# Patient Record
Sex: MALE | Race: WHITE | Employment: OTHER | URBAN - METROPOLITAN AREA
[De-identification: names, ages, dates, MRNs, and addresses within clinical notes are randomized per-mention and may not be internally consistent; named-entity substitution may affect disease eponyms.]

---

## 2017-01-13 RX ORDER — ATORVASTATIN CALCIUM 10 MG/1
10 TABLET, FILM COATED ORAL DAILY
Qty: 90 TABLET | Refills: 1 | Status: SHIPPED | OUTPATIENT
Start: 2017-01-13 | End: 2017-07-12 | Stop reason: SDUPTHER

## 2017-04-20 ENCOUNTER — TELEPHONE (OUTPATIENT)
Dept: FAMILY MEDICINE CLINIC | Age: 67
End: 2017-04-20

## 2017-04-20 DIAGNOSIS — E78.2 MIXED HYPERLIPIDEMIA: Primary | Chronic | ICD-10-CM

## 2017-04-20 DIAGNOSIS — Z12.5 SCREENING PSA (PROSTATE SPECIFIC ANTIGEN): ICD-10-CM

## 2017-04-26 ENCOUNTER — HOSPITAL ENCOUNTER (OUTPATIENT)
Age: 67
Setting detail: SPECIMEN
Discharge: HOME | End: 2017-04-26
Payer: COMMERCIAL

## 2017-04-26 ENCOUNTER — TELEPHONE (OUTPATIENT)
Dept: FAMILY MEDICINE CLINIC | Age: 67
End: 2017-04-26

## 2017-04-26 DIAGNOSIS — E78.2 MIXED HYPERLIPIDEMIA: Chronic | ICD-10-CM

## 2017-04-26 DIAGNOSIS — Z12.5 SCREENING PSA (PROSTATE SPECIFIC ANTIGEN): ICD-10-CM

## 2017-04-26 DIAGNOSIS — Z11.59 NEED FOR HEPATITIS C SCREENING TEST: Primary | ICD-10-CM

## 2017-04-26 LAB
ALBUMIN SERPL-MCNC: 4.3 G/DL (ref 3.5–5.2)
ALBUMIN/GLOBULIN RATIO: 2.2 (ref 1–2.5)
ALP BLD-CCNC: 76 U/L (ref 40–129)
ALT SERPL-CCNC: 25 U/L (ref 5–41)
ANION GAP SERPL CALCULATED.3IONS-SCNC: 16 MMOL/L (ref 9–17)
AST SERPL-CCNC: 26 U/L
BILIRUB SERPL-MCNC: 0.74 MG/DL (ref 0.3–1.2)
BUN BLDV-MCNC: 18 MG/DL (ref 8–23)
BUN/CREAT BLD: ABNORMAL (ref 9–20)
CALCIUM SERPL-MCNC: 8.6 MG/DL (ref 8.6–10.4)
CHLORIDE BLD-SCNC: 101 MMOL/L (ref 98–107)
CHOLESTEROL, FASTING: 188 MG/DL
CHOLESTEROL/HDL RATIO: 3.2
CO2: 24 MMOL/L (ref 20–31)
CREAT SERPL-MCNC: 0.63 MG/DL (ref 0.7–1.2)
GFR AFRICAN AMERICAN: >60 ML/MIN
GFR NON-AFRICAN AMERICAN: >60 ML/MIN
GFR SERPL CREATININE-BSD FRML MDRD: ABNORMAL ML/MIN/{1.73_M2}
GFR SERPL CREATININE-BSD FRML MDRD: ABNORMAL ML/MIN/{1.73_M2}
GLUCOSE FASTING: 94 MG/DL (ref 70–99)
HDLC SERPL-MCNC: 58 MG/DL
HEPATITIS C ANTIBODY: NONREACTIVE
LDL CHOLESTEROL: 113 MG/DL (ref 0–130)
POTASSIUM SERPL-SCNC: 4.7 MMOL/L (ref 3.7–5.3)
PROSTATE SPECIFIC ANTIGEN: 0.84 UG/L
SODIUM BLD-SCNC: 141 MMOL/L (ref 135–144)
TOTAL PROTEIN: 6.3 G/DL (ref 6.4–8.3)
TRIGLYCERIDE, FASTING: 83 MG/DL
VLDLC SERPL CALC-MCNC: NORMAL MG/DL (ref 1–30)

## 2017-05-15 ENCOUNTER — OFFICE VISIT (OUTPATIENT)
Dept: FAMILY MEDICINE CLINIC | Age: 67
End: 2017-05-15
Payer: COMMERCIAL

## 2017-05-15 VITALS
TEMPERATURE: 98.1 F | WEIGHT: 215.3 LBS | DIASTOLIC BLOOD PRESSURE: 82 MMHG | BODY MASS INDEX: 28.41 KG/M2 | SYSTOLIC BLOOD PRESSURE: 144 MMHG | HEART RATE: 84 BPM | RESPIRATION RATE: 16 BRPM

## 2017-05-15 DIAGNOSIS — M25.552 LEFT HIP PAIN: ICD-10-CM

## 2017-05-15 DIAGNOSIS — Z23 NEED FOR PROPHYLACTIC VACCINATION AGAINST STREPTOCOCCUS PNEUMONIAE (PNEUMOCOCCUS): ICD-10-CM

## 2017-05-15 DIAGNOSIS — Z00.00 ROUTINE GENERAL MEDICAL EXAMINATION AT A HEALTH CARE FACILITY: Primary | ICD-10-CM

## 2017-05-15 PROBLEM — J30.9 ALLERGIC RHINITIS: Status: ACTIVE | Noted: 2017-02-02

## 2017-05-15 PROBLEM — M12.819 ROTATOR CUFF ARTHROPATHY: Status: ACTIVE | Noted: 2017-02-02

## 2017-05-15 PROCEDURE — 90732 PPSV23 VACC 2 YRS+ SUBQ/IM: CPT | Performed by: PEDIATRICS

## 2017-05-15 PROCEDURE — G0439 PPPS, SUBSEQ VISIT: HCPCS | Performed by: PEDIATRICS

## 2017-05-15 PROCEDURE — 90471 IMMUNIZATION ADMIN: CPT | Performed by: PEDIATRICS

## 2017-05-15 RX ORDER — M-VIT,TX,IRON,MINS/CALC/FOLIC 27MG-0.4MG
1 TABLET ORAL DAILY
COMMUNITY
Start: 2017-05-15 | End: 2018-08-13 | Stop reason: SDUPTHER

## 2017-05-15 ASSESSMENT — LIFESTYLE VARIABLES
AUDIT-C TOTAL SCORE: 1
HOW OFTEN DURING THE LAST YEAR HAVE YOU BEEN UNABLE TO REMEMBER WHAT HAPPENED THE NIGHT BEFORE BECAUSE YOU HAD BEEN DRINKING: 0
HOW MANY STANDARD DRINKS CONTAINING ALCOHOL DO YOU HAVE ON A TYPICAL DAY: 0
HAVE YOU OR SOMEONE ELSE BEEN INJURED AS A RESULT OF YOUR DRINKING: 0
HAS A RELATIVE, FRIEND, DOCTOR, OR ANOTHER HEALTH PROFESSIONAL EXPRESSED CONCERN ABOUT YOUR DRINKING OR SUGGESTED YOU CUT DOWN: 0
HOW OFTEN DURING THE LAST YEAR HAVE YOU HAD A FEELING OF GUILT OR REMORSE AFTER DRINKING: 0
HOW OFTEN DURING THE LAST YEAR HAVE YOU FOUND THAT YOU WERE NOT ABLE TO STOP DRINKING ONCE YOU HAD STARTED: 0
HOW OFTEN DURING THE LAST YEAR HAVE YOU FAILED TO DO WHAT WAS NORMALLY EXPECTED FROM YOU BECAUSE OF DRINKING: 0
HOW OFTEN DO YOU HAVE SIX OR MORE DRINKS ON ONE OCCASION: 0
HOW OFTEN DURING THE LAST YEAR HAVE YOU NEEDED AN ALCOHOLIC DRINK FIRST THING IN THE MORNING TO GET YOURSELF GOING AFTER A NIGHT OF HEAVY DRINKING: 0
HOW OFTEN DO YOU HAVE A DRINK CONTAINING ALCOHOL: 1
AUDIT TOTAL SCORE: 1

## 2017-05-15 ASSESSMENT — PATIENT HEALTH QUESTIONNAIRE - PHQ9: SUM OF ALL RESPONSES TO PHQ QUESTIONS 1-9: 0

## 2017-05-15 ASSESSMENT — ANXIETY QUESTIONNAIRES: GAD7 TOTAL SCORE: 0

## 2017-05-16 ENCOUNTER — HOSPITAL ENCOUNTER (OUTPATIENT)
Facility: CLINIC | Age: 67
Discharge: HOME | End: 2017-05-16
Payer: COMMERCIAL

## 2017-05-16 ENCOUNTER — HOSPITAL ENCOUNTER (OUTPATIENT)
Dept: GENERAL RADIOLOGY | Facility: CLINIC | Age: 67
Discharge: HOME | End: 2017-05-16
Payer: COMMERCIAL

## 2017-05-16 DIAGNOSIS — M25.552 LEFT HIP PAIN: ICD-10-CM

## 2017-05-16 PROCEDURE — 73502 X-RAY EXAM HIP UNI 2-3 VIEWS: CPT

## 2017-05-16 RX ORDER — DICLOFENAC SODIUM 75 MG/1
75 TABLET, DELAYED RELEASE ORAL 2 TIMES DAILY
Qty: 60 TABLET | Refills: 2 | Status: SHIPPED | OUTPATIENT
Start: 2017-05-16 | End: 2017-05-31 | Stop reason: ALTCHOICE

## 2017-05-31 ENCOUNTER — TELEPHONE (OUTPATIENT)
Dept: FAMILY MEDICINE CLINIC | Age: 67
End: 2017-05-31

## 2017-05-31 DIAGNOSIS — M16.12 PRIMARY OSTEOARTHRITIS OF LEFT HIP: Primary | ICD-10-CM

## 2017-05-31 RX ORDER — ETODOLAC 400 MG/1
400 TABLET, FILM COATED ORAL 2 TIMES DAILY
Qty: 60 TABLET | Refills: 3 | Status: SHIPPED | OUTPATIENT
Start: 2017-05-31 | End: 2017-09-14 | Stop reason: SDUPTHER

## 2017-06-05 ENCOUNTER — TELEPHONE (OUTPATIENT)
Dept: FAMILY MEDICINE CLINIC | Age: 67
End: 2017-06-05

## 2017-06-05 DIAGNOSIS — M25.552 LEFT HIP PAIN: Primary | ICD-10-CM

## 2017-06-05 RX ORDER — METHYLPREDNISOLONE 4 MG/1
TABLET ORAL
Qty: 1 KIT | Refills: 0 | Status: SHIPPED | OUTPATIENT
Start: 2017-06-05 | End: 2017-06-11

## 2017-06-13 RX ORDER — HYDROCODONE BITARTRATE AND ACETAMINOPHEN 5; 325 MG/1; MG/1
1 TABLET ORAL EVERY 8 HOURS PRN
Qty: 21 TABLET | Refills: 0 | Status: SHIPPED | OUTPATIENT
Start: 2017-06-13 | End: 2017-06-30 | Stop reason: SDUPTHER

## 2017-06-30 DIAGNOSIS — M25.552 LEFT HIP PAIN: ICD-10-CM

## 2017-06-30 RX ORDER — HYDROCODONE BITARTRATE AND ACETAMINOPHEN 5; 325 MG/1; MG/1
1 TABLET ORAL EVERY 8 HOURS PRN
Qty: 21 TABLET | Refills: 0 | Status: SHIPPED | OUTPATIENT
Start: 2017-06-30 | End: 2017-07-07

## 2017-07-12 RX ORDER — ATORVASTATIN CALCIUM 10 MG/1
10 TABLET, FILM COATED ORAL DAILY
Qty: 90 TABLET | Refills: 1 | Status: SHIPPED | OUTPATIENT
Start: 2017-07-12 | End: 2018-01-08 | Stop reason: SDUPTHER

## 2017-08-07 ENCOUNTER — TELEPHONE (OUTPATIENT)
Dept: FAMILY MEDICINE CLINIC | Age: 67
End: 2017-08-07

## 2017-08-07 DIAGNOSIS — M16.12 PRIMARY OSTEOARTHRITIS OF LEFT HIP: Primary | ICD-10-CM

## 2017-09-13 RX ORDER — OMEPRAZOLE 20 MG/1
20 CAPSULE, DELAYED RELEASE ORAL 2 TIMES DAILY
Qty: 180 CAPSULE | Refills: 2 | Status: SHIPPED | OUTPATIENT
Start: 2017-09-13 | End: 2018-06-15 | Stop reason: SDUPTHER

## 2017-09-14 DIAGNOSIS — M16.12 PRIMARY OSTEOARTHRITIS OF LEFT HIP: ICD-10-CM

## 2017-09-14 RX ORDER — ETODOLAC 400 MG/1
400 TABLET, FILM COATED ORAL 2 TIMES DAILY
Qty: 180 TABLET | Refills: 1 | Status: SHIPPED | OUTPATIENT
Start: 2017-09-14 | End: 2018-03-25 | Stop reason: SDUPTHER

## 2017-10-24 ENCOUNTER — IMMUNIZATION (OUTPATIENT)
Dept: FAMILY MEDICINE CLINIC | Age: 67
End: 2017-10-24
Payer: MEDICARE

## 2017-10-24 PROCEDURE — 90471 IMMUNIZATION ADMIN: CPT | Performed by: PEDIATRICS

## 2017-10-24 PROCEDURE — 90688 IIV4 VACCINE SPLT 0.5 ML IM: CPT | Performed by: PEDIATRICS

## 2017-11-14 ENCOUNTER — OFFICE VISIT (OUTPATIENT)
Dept: FAMILY MEDICINE CLINIC | Age: 67
End: 2017-11-14
Payer: MEDICARE

## 2017-11-14 ENCOUNTER — HOSPITAL ENCOUNTER (OUTPATIENT)
Age: 67
Setting detail: SPECIMEN
Discharge: HOME | End: 2017-11-14
Payer: MEDICARE

## 2017-11-14 VITALS
DIASTOLIC BLOOD PRESSURE: 78 MMHG | BODY MASS INDEX: 29.41 KG/M2 | RESPIRATION RATE: 24 BRPM | HEART RATE: 76 BPM | SYSTOLIC BLOOD PRESSURE: 124 MMHG | TEMPERATURE: 98.2 F | WEIGHT: 210.9 LBS

## 2017-11-14 DIAGNOSIS — G62.9 PERIPHERAL POLYNEUROPATHY: ICD-10-CM

## 2017-11-14 DIAGNOSIS — E78.2 MIXED HYPERLIPIDEMIA: Primary | Chronic | ICD-10-CM

## 2017-11-14 DIAGNOSIS — M79.10 MYALGIA: ICD-10-CM

## 2017-11-14 DIAGNOSIS — R25.1 TREMOR OF LEFT HAND: ICD-10-CM

## 2017-11-14 DIAGNOSIS — M16.12 PRIMARY OSTEOARTHRITIS OF LEFT HIP: ICD-10-CM

## 2017-11-14 DIAGNOSIS — E78.2 MIXED HYPERLIPIDEMIA: Chronic | ICD-10-CM

## 2017-11-14 LAB
C-REACTIVE PROTEIN: 3.4 MG/L (ref 0–5)
MAGNESIUM: 2.3 MG/DL (ref 1.6–2.6)
MYOGLOBIN: 53 NG/ML (ref 28–72)
TSH SERPL DL<=0.05 MIU/L-ACNC: 3.02 MIU/L (ref 0.3–5)
VITAMIN B-12: 444 PG/ML (ref 211–946)

## 2017-11-14 PROCEDURE — 99214 OFFICE O/P EST MOD 30 MIN: CPT | Performed by: PEDIATRICS

## 2017-11-14 ASSESSMENT — ENCOUNTER SYMPTOMS
CONSTIPATION: 0
EYE DISCHARGE: 0
BACK PAIN: 1
DIARRHEA: 0

## 2017-11-14 NOTE — PROGRESS NOTES
Values used to calculate the score:      Age: 79 years      Sex: Male      Is Non- : No      Diabetic: No      Tobacco smoker: No      Systolic Blood Pressure: 938 mmHg      Is BP treated: No      HDL Cholesterol: 58 mg/dL      Total Cholesterol: 183 mg/dL    Wt Readings from Last 3 Encounters:   11/14/17 210 lb 14.4 oz (95.7 kg)   08/11/17 213 lb (96.6 kg)   05/15/17 215 lb 4.8 oz (97.7 kg)     BP Readings from Last 3 Encounters:   11/14/17 124/78   08/11/17 131/80   05/15/17 (!) 144/82     Pulse Readings from Last 3 Encounters:   11/14/17 76   08/11/17 72   05/15/17 84       Fall Risk 5/15/2017 4/27/2016 9/11/2015   2 or more falls in past year? no no no   Fall with injury in past year? no no no       PHQ-9 Total Score: 0 (5/15/2017  9:20 AM)    Has been doing ok. Still with some left hip pain that is not improving a lot. Considering surgery. Has had injections. Slight tremor in left hand. Mild. Also noticed some numbness now in right hand around the 1st  2nd and some at 3rd. Has been for a while. Has not had it any evaluation for it.    prilosec daily does ok. Takes nsaid bid. Hyperlipidemia: Douglas Robertson presents for follow up of lipids. He has had high cholesterol onset years ago. Compliance with treatment thus far has been excellent. A repeat fasting lipid profile was done. He medication use that may worsen dyslipidemias None  . He exercises intermittently. He is adherent to diet compliant most of the time. He states there are the following side effects of medications: none. Regular with medication. Not sure if having side effects.       Lab Results   Component Value Date    LDLCALC 115 05/15/2014    LDLCHOLESTEROL 113 04/26/2017         Current Outpatient Prescriptions   Medication Sig Dispense Refill    etodolac (LODINE) 400 MG tablet Take 1 tablet by mouth 2 times daily 180 tablet 1    omeprazole (PRILOSEC) 20 MG delayed release capsule Take 1 capsule by mouth 2 polyuria. Genitourinary: Negative for dysuria. Musculoskeletal: Positive for arthralgias and back pain (lower). Neurological: Positive for tremors and numbness. Negative for weakness. Slight tremor in left hand at times. No other neurologic symptoms. Some numbness in left foot at times. Hematological: Negative. Psychiatric/Behavioral: Negative. Objective:   Physical Exam   Constitutional: He is oriented to person, place, and time. He appears well-developed. No distress. Overweight    HENT:   Head: Normocephalic and atraumatic. Eyes: Conjunctivae are normal. Right eye exhibits no discharge. Left eye exhibits no discharge. Cardiovascular: Normal rate and regular rhythm. Pulses:       Carotid pulses are 2+ on the right side, and 2+ on the left side. Radial pulses are 2+ on the right side, and 2+ on the left side. Posterior tibial pulses are 2+ on the right side, and 2+ on the left side. Pulmonary/Chest: Effort normal and breath sounds normal. No respiratory distress. He has no wheezes. Abdominal: Soft. Bowel sounds are normal. He exhibits no distension. There is no tenderness. Musculoskeletal: He exhibits no edema. Diffuse DJD. No redness or swelling. Lymphadenopathy:     He has no cervical adenopathy. Neurological: He is alert and oriented to person, place, and time. He displays normal reflexes. He exhibits normal muscle tone. Normal strength in arms and hands. Negative phalen and tinnels signs. Skin: Skin is warm. No erythema. Psychiatric: He has a normal mood and affect. His behavior is normal.       Assessment:      1. Mixed hyperlipidemia  Stable. Continue diet. Continue meds. Discussed last labs  - TSH without Reflex; Future    2. Primary osteoarthritis of left hip  Still with pain. Labs to check for inflammatory arthritis  - C-Reactive Protein; Future    3. Tremor of left hand  Mild. Likely familiar tremor.    Monitor.    - Vitamin B12; Future  - C-Reactive Protein; Future    4. Peripheral polyneuropathy (HCC)  Upper extremities. More in right hand. Likely carpal tunnel  - TSH without Reflex; Future  - Vitamin B12; Future  - EMG; Future    5. Myalgia  Stable. .  No sign of inflammation.   - Myoglobin, Serum; Future  - Magnesium; Future  - C-Reactive Protein; Future          Plan:      Check thyroid and b12 due to neuropathy, mag level, mgb  EMG of both upper arms  Continue current meds  Continue exercise. 1.  Douglas Robertson received counseling on the following healthy behaviors: nutrition, exercise and medication adherence  2. Reviewed prior labs and health maintenance  3. Continue current medications, diet and exercise. 4.  Discussed use, benefit, and side effects of prescribed medications. Barriers to medication compliance addressed. All patient questions answered. Pt voiced understanding. 5.  Patient given educational materials - see patient instructions  6. Was a self-tracking handout given in paper form or via [employer]? No  If yes, see orders or list here. Requested Prescriptions      No prescriptions requested or ordered in this encounter       All patient questions answered. Patient voiced understanding. Quality Measures    Body mass index is 29.41 kg/m². Elevated. Weight control planned discussed  Healthy diet and regular exercise. BP: 124/78. Blood pressure is normal. Treatment plan consists of No treatment change needed. Lab Results   Component Value Date    [address] 115 05/15/2014    LDLCHOLESTEROL 113 04/26/2017    (goal LDL reduction with dx if diabetes is 50% LDL reduction)  Statin therapy maximized for diabetic or CAD - NA    Fall Risk 5/15/2017 4/27/2016 9/11/2015   2 or more falls in past year? no no no   Fall with injury in past year? no no no     Fall risk screening  did get completed. The patient does not have a history of falls.  A plan of care for falls No Treatment plan indicated    PHQ Scores 5/15/2017 4/27/2016 9/11/2015   PHQ2 Score 0 0 0   PHQ9 Score 0 0 0     Interpretation of Total Score Depression Severity: 1-4 = Minimal depression, 5-9 = Mild depression, 10-14 = Moderate depression, 15-19 = Moderately severe depression, 20-27 = Severe depression  Normal    There are no preventive care reminders to display for this patient. Above due items addressed.  Declined past due, Discussed and Ordered

## 2017-11-14 NOTE — PATIENT INSTRUCTIONS
Today's office visit was for the following diagnosis    ICD-10-CM ICD-9-CM    1. Mixed hyperlipidemia E78.2 272.2 TSH without Reflex   2. Primary osteoarthritis of left hip M16.12 715.15 C-Reactive Protein   3. Tremor of left hand R25.1 781. 0 Vitamin B12      C-Reactive Protein   4. Peripheral polyneuropathy (HCC) G62.9 356.9 TSH without Reflex      Vitamin B12      EMG   5. Myalgia M79.1 729.1 Myoglobin, Serum      Magnesium      C-Reactive Protein       The treatment plan consists of the following     Check thyroid and b12 due to neuropathy, mag level, mgb  EMG of both upper arms  Continue current meds  Continue exercise. All Future Testing planned in [facility]  Lab Frequency Next Occurrence   TSH without Reflex Once 11/14/2017   Vitamin B12 Once 11/14/2017   Myoglobin, Serum Once 11/14/2017   Magnesium Once 11/14/2017   C-Reactive Protein Once 11/14/2017   EMG Once 11/21/2017       Survey of office experience to help improve our quality of service. Please note that you may receive a patient satisfaction survey either by [employer] [facility] postal mail service or email. We would appreciate you taking the time to complete and return the survey. We value your opinion and will use your comments to help improve our care and  service to our patients    Health Maintenance  Please also note the list of Health maintenance items for you and their due dates. Help us continue to provide excellent health care by keeping these items up to date. We will be happy to assist you in getting this completed in a timely fashion. There are no preventive care reminders to display for this patient. [facility] [address]  24 hours emergency services daily  [address]    Patient Education        Hip Arthritis: Exercises  Your Care Instructions  Here are some examples of exercises for hip arthritis. Start each exercise slowly.  Ease off the exercise if you start to have pain.  Your doctor or physical therapist will tell you when you can start these exercises and which ones will work best for you. How to do the exercises  Straight-leg raises to the outside    1. Lie on your side, with your affected hip on top. 2. Tighten the front thigh muscles of your top leg to keep your knee straight. 3. Keep your hip and your leg straight in line with the rest of your body, and keep your knee pointing forward. Do not drop your hip back. 4. Lift your top leg straight up toward the ceiling, about 12 inches off the floor. Hold for about 6 seconds, then slowly lower your leg. 5. Repeat 8 to 12 times. 6. Switch legs and repeat steps 1 through 5, even if only one hip is sore. Straight-leg raises to the inside    1. Lie on your side with your affected hip on the floor. 2. You can either prop up your other leg on a chair, or you can bend that knee and put that foot in front of your other knee. Do not drop your hip back. 3. Tighten the muscles on the front thigh of your bottom leg to straighten that knee. 4. Keep your kneecap pointing forward and your leg straight, and lift your bottom leg up toward the ceiling about 6 inches. Hold for about 6 seconds, then lower slowly. 5. Repeat 8 to 12 times. 6. Switch legs and repeat steps 1 through 5, even if only one hip is sore. Hip hike    1. Stand sideways on the bottom step of a staircase, and hold on to the banister or wall. 2. Keeping both knees straight, lift your good leg off the step and let it hang down. Then hike your good hip up to the same level as your affected hip or a little higher. 3. Repeat 8 to 12 times. 4. Switch legs and repeat steps 1 through 3, even if only one hip is sore. Bridging    1. Lie on your back with both knees bent. Your knees should be bent about 90 degrees.   2. Then push your feet into the floor, squeeze your buttocks, and lift your hips off the floor until your shoulders, hips, and knees are all in a straight 6, even if only one hip is sore. Knee-to-chest    1. Lie on your back with your knees bent and your feet flat on the floor. 2. Bring your affected leg to your chest, keeping the other foot flat on the floor (or keeping the other leg straight, whichever feels better on your lower back). 3. Keep your lower back pressed to the floor. Hold for at least 15 to 30 seconds. 4. Relax, and lower the knee to the starting position. 5. Repeat 2 to 4 times. 6. Switch legs and repeat steps 1 through 5, even if only one hip is sore. 7. To get more stretch, put your other leg flat on the floor while pulling your knee to your chest.  Clamshell    1. Lie on your side, with your affected hip on top. Keep your feet and knees together and your knees bent. 2. Raise your top knee, but keep your feet together. Do not let your hips roll back. Your legs should open up like a clamshell. 3. Hold for 6 seconds. 4. Slowly lower your knee back down. Rest for 10 seconds. 5. Repeat 8 to 12 times. 6. Switch legs and repeat steps 1 through 5, even if only one hip is sore. Follow-up care is a key part of your treatment and safety. Be sure to make and go to all appointments, and call your doctor if you are having problems. It's also a good idea to know your test results and keep a list of the medicines you take. Where can you learn more? Go to [email or web address] org and sign in to your [employer] account. Enter I925 in the [facility] box to learn more about \"Hip Arthritis: Exercises. \"     If you do not have an account, please click on the \"Sign Up Now\" link. Current as of: March 21, 2017  Content Version: 11.3  © 5462-6441 [employer]. Care instructions adapted under license by [facility] ([facility]). If you have questions about a medical condition or this instruction, always ask your healthcare professional. [address] any warranty or liability for your use of this information. with a straw. When should you call for help? Watch closely for changes in your health, and be sure to contact your doctor if:  · You notice your tremors are getting worse. · You can't do your everyday activities because of your tremors. · You are sad and embarrassed about your shaking. Where can you learn more? Go to [email or web address]://[email or web address].[email or web address] org and sign in to your [employer] account. Enter B746 in the [employer] box to learn more about \"Benign Essential Tremor: Care Instructions. \"     If you do not have an account, please click on the \"Sign Up Now\" link. Current as of: October 14, 2016  Content Version: 11.3  © 7402-6526 [employer]. Care instructions adapted under license by [facility] ([facility]). If you have questions about a medical condition or this instruction, always ask your healthcare professional. [address] any warranty or liability for your use of this information.

## 2017-11-29 DIAGNOSIS — G62.9 PERIPHERAL POLYNEUROPATHY: ICD-10-CM

## 2017-12-01 DIAGNOSIS — G56.03 CARPAL TUNNEL SYNDROME ON BOTH SIDES: Primary | ICD-10-CM

## 2018-01-08 DIAGNOSIS — E78.2 MIXED HYPERLIPIDEMIA: Primary | Chronic | ICD-10-CM

## 2018-01-09 RX ORDER — ATORVASTATIN CALCIUM 10 MG/1
10 TABLET, FILM COATED ORAL DAILY
Qty: 90 TABLET | Refills: 1 | Status: SHIPPED | OUTPATIENT
Start: 2018-01-09 | End: 2018-07-08 | Stop reason: SDUPTHER

## 2018-02-15 ENCOUNTER — OFFICE VISIT (OUTPATIENT)
Dept: FAMILY MEDICINE CLINIC | Age: 68
End: 2018-02-15
Payer: MEDICARE

## 2018-02-15 VITALS
BODY MASS INDEX: 30.52 KG/M2 | OXYGEN SATURATION: 96 % | SYSTOLIC BLOOD PRESSURE: 128 MMHG | TEMPERATURE: 98.1 F | RESPIRATION RATE: 18 BRPM | HEIGHT: 71 IN | WEIGHT: 218 LBS | DIASTOLIC BLOOD PRESSURE: 72 MMHG | HEART RATE: 85 BPM

## 2018-02-15 DIAGNOSIS — R09.82 PND (POST-NASAL DRIP): Primary | ICD-10-CM

## 2018-02-15 DIAGNOSIS — J06.9 VIRAL URI: ICD-10-CM

## 2018-02-15 PROCEDURE — 99213 OFFICE O/P EST LOW 20 MIN: CPT | Performed by: NURSE PRACTITIONER

## 2018-02-15 RX ORDER — FLUTICASONE PROPIONATE 50 MCG
1 SPRAY, SUSPENSION (ML) NASAL DAILY
Qty: 1 BOTTLE | Refills: 0 | Status: SHIPPED | OUTPATIENT
Start: 2018-02-15 | End: 2018-03-14 | Stop reason: SDUPTHER

## 2018-02-15 RX ORDER — LORATADINE AND PSEUDOEPHEDRINE 10; 240 MG/1; MG/1
1 TABLET, EXTENDED RELEASE ORAL DAILY
Qty: 30 TABLET | Refills: 0 | Status: SHIPPED | OUTPATIENT
Start: 2018-02-15 | End: 2018-03-17

## 2018-02-15 ASSESSMENT — ENCOUNTER SYMPTOMS
EYE DISCHARGE: 0
DIARRHEA: 0
SINUS PRESSURE: 0
EYES NEGATIVE: 1
EYE ITCHING: 0
SINUS PAIN: 0
RHINORRHEA: 0
NAUSEA: 0
VOMITING: 0
COUGH: 1
HOARSE VOICE: 0
SORE THROAT: 1
SHORTNESS OF BREATH: 0
SWOLLEN GLANDS: 0
ABDOMINAL PAIN: 0
ALLERGIC/IMMUNOLOGIC NEGATIVE: 1
CHEST TIGHTNESS: 0

## 2018-02-15 NOTE — PROGRESS NOTES
abdominal pain, diarrhea, nausea and vomiting. Endocrine: Negative. Genitourinary: Negative for dysuria, frequency and urgency. Musculoskeletal: Negative for neck pain and neck stiffness. Skin: Negative for rash. Allergic/Immunologic: Negative. Neurological: Positive for headaches. Negative for dizziness, weakness and light-headedness. Slight HA, denies photophobia    Hematological: Negative for adenopathy. Psychiatric/Behavioral: Negative for confusion. Objective:     Physical Exam   Constitutional: He is oriented to person, place, and time. Vital signs are normal. He appears well-developed and well-nourished. HENT:   Head: Normocephalic. Right Ear: External ear normal. A middle ear effusion is present. Left Ear: External ear normal. A middle ear effusion is present. Nose: Mucosal edema present. Right sinus exhibits no maxillary sinus tenderness and no frontal sinus tenderness. Left sinus exhibits no maxillary sinus tenderness and no frontal sinus tenderness. Mouth/Throat: Posterior oropharyngeal erythema present. [place]- wear bilat alok aids. Thick yellow bilat mid ear effusion, R worse than L. Posterior pharynx with erythema    Eyes: Conjunctivae and EOM are normal. Pupils are equal, round, and reactive to light. Neck: Normal range of motion. Neck supple. Cardiovascular: Normal rate, regular rhythm and normal heart sounds. Exam reveals no gallop and no friction rub. No murmur heard. Pulmonary/Chest: Effort normal and breath sounds normal. No accessory muscle usage. No respiratory distress. He has no decreased breath sounds. He has no wheezes. He has no rhonchi. He has no rales. No Resp distress noted    Abdominal: Soft. Bowel sounds are normal.   Musculoskeletal: Normal range of motion. Lymphadenopathy:     He has no cervical adenopathy. Neurological: He is alert and oriented to person, place, and time. He has normal reflexes. No cranial nerve deficit. Coordination normal.   Skin: Skin is warm and dry. No rash noted. Psychiatric: He has a normal mood and affect. Thought content normal.   Vitals reviewed. /72 (Site: Left Arm, Position: Sitting, Cuff Size: Large Adult)   Pulse 85   Temp 98.1 °F (36.7 °C) (Tympanic)   Resp 18   Ht 5' 10.98\" (1.803 m)   Wt 218 lb (98.9 kg)   SpO2 96%   BMI 30.42 kg/m²     Assessment:      1. PND (post-nasal drip)  fluticasone (FLONASE) 50 MCG/ACT nasal spray   2. Viral URI  loratadine-pseudoephedrine (CLARITIN-D 24 HOUR)  MG per extended release tablet             Plan:     1.) Educated likely viral in nature   2.) Start Flonase PRN   3.) Start Claritin PRN   4.) Continue with Mucinex   5.) Follow-up with PCP or RTO for persistent or worsening of symptoms     Problem List     None           Patient given educational materials - see patient instructions. Discussed use, benefit, and side effects of prescribed medications. All patient questions answered. Pt verbalized understanding. Instructed to continue current medications, diet and exercise. Patient agreed with treatment plan. Follow up as directed.      Electronically signed by Juancarlos Sepulveda CNP on 2/15/2018 at 10:57 AM

## 2018-02-27 ENCOUNTER — OFFICE VISIT (OUTPATIENT)
Dept: FAMILY MEDICINE CLINIC | Age: 68
End: 2018-02-27
Payer: MEDICARE

## 2018-02-27 VITALS
BODY MASS INDEX: 30 KG/M2 | DIASTOLIC BLOOD PRESSURE: 88 MMHG | WEIGHT: 215 LBS | HEART RATE: 76 BPM | SYSTOLIC BLOOD PRESSURE: 140 MMHG

## 2018-02-27 DIAGNOSIS — H66.002 ACUTE SUPPURATIVE OTITIS MEDIA OF LEFT EAR WITHOUT SPONTANEOUS RUPTURE OF TYMPANIC MEMBRANE, RECURRENCE NOT SPECIFIED: Primary | ICD-10-CM

## 2018-02-27 DIAGNOSIS — R59.0 CERVICAL ADENOPATHY: ICD-10-CM

## 2018-02-27 PROCEDURE — 99213 OFFICE O/P EST LOW 20 MIN: CPT | Performed by: NURSE PRACTITIONER

## 2018-02-27 RX ORDER — TRAMADOL HYDROCHLORIDE 50 MG/1
TABLET ORAL
COMMUNITY
Start: 2018-02-21 | End: 2018-06-18 | Stop reason: ALTCHOICE

## 2018-02-27 RX ORDER — AZITHROMYCIN 250 MG/1
TABLET, FILM COATED ORAL
Qty: 1 PACKET | Refills: 0 | Status: SHIPPED | OUTPATIENT
Start: 2018-02-27 | End: 2018-04-16 | Stop reason: ALTCHOICE

## 2018-02-27 ASSESSMENT — ENCOUNTER SYMPTOMS
SWOLLEN GLANDS: 1
COUGH: 0
SORE THROAT: 1

## 2018-02-27 NOTE — PROGRESS NOTES
[address]  [address] [id number]  Dept: [phone]    2/27/2018    CHIEF COMPLAINT    Chief Complaint   Patient presents with    Pharyngitis    Otalgia     left       HPI    Nestor Yusuf is a 79 y.o. male who presents   Chief Complaint   Patient presents with    Pharyngitis    Otalgia     left   . Pharyngitis   This is a recurrent problem. The current episode started 1 to 4 weeks ago (was seen 2/15 in  for similar symptoms). The problem occurs intermittently. The problem has been gradually worsening. Associated symptoms include congestion, a sore throat and swollen glands. Pertinent negatives include no chills, coughing, diaphoresis or fever. The symptoms are aggravated by swallowing. He has tried NSAIDs and drinking (Claritin D, flonase) for the symptoms. The treatment provided no relief. Otalgia    There is pain in the left ear. This is a recurrent problem. The current episode started 1 to 4 weeks ago. The problem occurs constantly. The problem has been gradually worsening. There has been no fever. The pain is moderate. Associated symptoms include a sore throat. Pertinent negatives include no coughing or ear discharge. Treatments tried: flonase, decongestants. The treatment provided mild relief. His past medical history is significant for hearing loss. There is no history of a chronic ear infection or a tympanostomy tube. REVIEW OF SYSTEMS    Review of Systems   Constitutional: Negative for chills, diaphoresis and fever. HENT: Positive for congestion, ear pain and sore throat. Negative for ear discharge. Respiratory: Negative for cough.         PAST MEDICAL HISTORY    Past Medical History:   Diagnosis Date    Allergic rhinitis     Arthritis     BPH (benign prostatic hyperplasia)     Caffeine use     3 cups coffee/day    Chronic back pain     Diarrhea     ED (erectile dysfunction)     Esophageal stricture     GERD (gastroesophageal reflux disease)     Headache(784.0)     Hemorrhoids     Hyperlipemia     Rotator cuff tear, right        FAMILY HISTORY    Family History   Problem Relation Age of Onset    Hypertension Mother          Cancer Father             SOCIAL HISTORY    Social History     Social History    Marital status:      Spouse name: N/A    Number of children: 3    Years of education: N/A     Occupational History    Retired Retired     Social History Main Topics    Smoking status: Former Smoker     Packs/day: 0.30     Years: 1.00     Types: Cigarettes     Start date: 1970     Quit date: 1971    Smokeless tobacco: Never Used    Alcohol use 8.4 oz/week     14 Cans of beer per week      Comment: 2 drinks per day    Drug use: No    Sexual activity: No     Other Topics Concern    None     Social History Narrative    None       SURGICAL HISTORY    Past Surgical History:   Procedure Laterality Date    APPENDECTOMY      COLONOSCOPY      CYSTOSCOPY  09    ESOPHAGEAL DILATATION      HAND SURGERY      HERNIA REPAIR      ROTATOR CUFF REPAIR      TOE SURGERY      UPPER GASTROINTESTINAL ENDOSCOPY         CURRENT MEDICATIONS    Current Outpatient Prescriptions   Medication Sig Dispense Refill    traMADol (ULTRAM) 50 MG tablet       azithromycin (ZITHROMAX Z-ARCENIO) 250 MG tablet Take 2 tablets (500 mg) by mouth today, then take 1 tablet (250 mg) by mouth for the next 4 days.  1 packet 0    atorvastatin (LIPITOR) 10 MG tablet Take 1 tablet by mouth daily 90 tablet 1    etodolac (LODINE) 400 MG tablet Take 1 tablet by mouth 2 times daily 180 tablet 1    omeprazole (PRILOSEC) 20 MG delayed release capsule Take 1 capsule by mouth 2 times daily (Patient taking differently: Take 20 mg by mouth Daily ) 180 capsule 2    tamsulosin (FLOMAX) 0.4 MG capsule Take 1 capsule by mouth daily 90 capsule 3    Probiotic Product (PROBIOTIC ADVANCED PO) Take 1 helpful as previously instructed      Minor Look received counseling on the following healthy behaviors: nutrition, exercise and medication adherence  Reviewed prior labs and health maintenance. Continue current medications, diet and exercise. Discussed use, benefit, and side effects of prescribed medications. Barriers to medication compliance addressed. Patient given educational materials - see patient instructions. All patient questions answered. Patient voiced understanding. Return if symptoms worsen or fail to improve.         Electronically signed by Brant Lloyd NP on 2/27/18 at 1:21 PM

## 2018-03-14 ENCOUNTER — OFFICE VISIT (OUTPATIENT)
Dept: FAMILY MEDICINE CLINIC | Age: 68
End: 2018-03-14
Payer: MEDICARE

## 2018-03-14 VITALS
SYSTOLIC BLOOD PRESSURE: 132 MMHG | BODY MASS INDEX: 30.42 KG/M2 | HEART RATE: 78 BPM | WEIGHT: 218 LBS | RESPIRATION RATE: 16 BRPM | DIASTOLIC BLOOD PRESSURE: 78 MMHG | TEMPERATURE: 98.7 F

## 2018-03-14 DIAGNOSIS — H65.92 MIDDLE EAR EFFUSION, LEFT: Primary | ICD-10-CM

## 2018-03-14 DIAGNOSIS — J02.9 SORE THROAT: ICD-10-CM

## 2018-03-14 PROCEDURE — 99213 OFFICE O/P EST LOW 20 MIN: CPT | Performed by: NURSE PRACTITIONER

## 2018-03-14 RX ORDER — FLUTICASONE PROPIONATE 50 MCG
1 SPRAY, SUSPENSION (ML) NASAL DAILY
Qty: 1 BOTTLE | Refills: 3 | COMMUNITY
Start: 2018-03-14 | End: 2018-06-18 | Stop reason: ALTCHOICE

## 2018-03-14 RX ORDER — LORATADINE 10 MG/1
10 TABLET ORAL DAILY
Qty: 30 TABLET | Refills: 0 | COMMUNITY
Start: 2018-03-14 | End: 2018-06-18 | Stop reason: ALTCHOICE

## 2018-03-14 ASSESSMENT — ENCOUNTER SYMPTOMS
COUGH: 0
NAUSEA: 0
SINUS PRESSURE: 0
SHORTNESS OF BREATH: 0
CHANGE IN BOWEL HABIT: 0
RHINORRHEA: 0
VOMITING: 0
SORE THROAT: 1
VOICE CHANGE: 1
DIARRHEA: 0
ABDOMINAL PAIN: 0

## 2018-03-14 NOTE — PROGRESS NOTES
Subjective:      Patient ID: [id number] Abdi is a 79 y.o. male. Visit Information    Have you changed or started any medications since your last visit including any over-the-counter medicines, vitamins, or herbal medicines? no   Have you stopped taking any of your medications? Is so, why? -  no  Are you having any side effects from any of your medications? - no    Have you seen any other physician or provider since your last visit?  no   Have you had any other diagnostic tests since your last visit?  no   Have you been seen in the emergency room and/or had an admission in a hospital since we last saw you?  no   Have you had your routine dental cleaning in the past 6 months?  yes - routine     Do you have an active MyChart account? If no, what is the barrier? Yes    Patient Care Team:  Leti Pérez MD as PCP - General (Internal Medicine)  Leti Pérez MD as PCP - S Attributed Provider  Hailee Archer MD as Consulting Physician (Urology)  Tameka Coffey as Consulting Physician (Ophthalmology)  Sandeep Ramirez MD (Gastroenterology)  Flori Morrow MD as Consulting Physician (Orthopedic Surgery)    Medical History Review  Past Medical, Family, and Social History reviewed and does contribute to the patient presenting condition    Health Maintenance   Topic Date Due    Shingles Vaccine (1 of 2 - 2 Dose Series) 09/10/2000    DTaP/Tdap/Td vaccine (2 - Td) 11/23/2019    Lipid screen  04/26/2022    Colon cancer screen colonoscopy  03/22/2027    Flu vaccine  Completed    Pneumococcal low/med risk  Completed    AAA screen  Completed    Hepatitis C screen  Completed       Otalgia    There is pain in the left ear. This is a recurrent problem. The current episode started 1 to 4 weeks ago. The problem occurs constantly. The problem has been unchanged. There has been no fever. The pain is mild.  Associated symptoms include hearing loss (chronic - hearing aides bilaterally) and a sore throat (only when

## 2018-03-14 NOTE — PATIENT INSTRUCTIONS
Patient Education        Sore Throat: Care Instructions  Your Care Instructions    Infection by bacteria or a virus causes most sore throats. Cigarette smoke, dry air, air pollution, allergies, and yelling can also cause a sore throat. Sore throats can be painful and annoying. Fortunately, most sore throats go away on their own. If you have a bacterial infection, your doctor may prescribe antibiotics. Follow-up care is a key part of your treatment and safety. Be sure to make and go to all appointments, and call your doctor if you are having problems. It's also a good idea to know your test results and keep a list of the medicines you take. How can you care for yourself at home? · If your doctor prescribed antibiotics, take them as directed. Do not stop taking them just because you feel better. You need to take the full course of antibiotics. · Gargle with warm salt water once an hour to help reduce swelling and relieve discomfort. Use 1 teaspoon of salt mixed in 1 cup of warm water. · Take an over-the-counter pain medicine, such as acetaminophen (Tylenol), ibuprofen (Advil, Motrin), or naproxen (Aleve). Read and follow all instructions on the label. · Be careful when taking over-the-counter cold or flu medicines and Tylenol at the same time. Many of these medicines have acetaminophen, which is Tylenol. Read the labels to make sure that you are not taking more than the recommended dose. Too much acetaminophen (Tylenol) can be harmful. · Drink plenty of fluids. Fluids may help soothe an irritated throat. Hot fluids, such as tea or soup, may help decrease throat pain. · Use over-the-counter throat lozenges to soothe pain. Regular cough drops or hard candy may also help. These should not be given to young children because of the risk of choking. · Do not smoke or allow others to smoke around you. If you need help quitting, talk to your doctor about stop-smoking programs and medicines.  These can increase your chances of quitting for good. · Use a vaporizer or humidifier to add moisture to your bedroom. Follow the directions for cleaning the machine. When should you call for help? Call your doctor now or seek immediate medical care if:  ? · You have new or worse trouble swallowing. ? · Your sore throat gets much worse on one side. ? Watch closely for changes in your health, and be sure to contact your doctor if you do not get better as expected. Where can you learn more? Go to https://CutefundpeHeliae.TYSON Security. org and sign in to your Orchestrate account. Enter S063 in the Moviles.com box to learn more about \"Sore Throat: Care Instructions. \"     If you do not have an account, please click on the \"Sign Up Now\" link. Current as of: May 12, 2017  Content Version: 11.5  © 3804-3586 [employer]. Care instructions adapted under license by [facility] ([facility]). If you have questions about a medical condition or this instruction, always ask your healthcare professional. [place] [address] any warranty or liability for your use of this information. Patient Education        Middle Ear Fluid: Care Instructions  Your Care Instructions    Fluid often builds up inside the ear during a cold or allergies. Usually the fluid drains away, but sometimes a small tube in the ear, called the eustachian tube, stays blocked for months. Symptoms of fluid buildup may include:  · Popping, ringing, or a feeling of fullness or pressure in the ear. · Trouble hearing. · Balance problems and dizziness. In most cases, you can treat yourself at home. Follow-up care is a key part of your treatment and safety. Be sure to make and go to all appointments, and call your doctor if you are having problems. It's also a good idea to know your test results and keep a list of the medicines you take. How can you care for yourself at home? · In most cases, the fluid clears up within a few months without treatment.

## 2018-03-25 DIAGNOSIS — M16.12 PRIMARY OSTEOARTHRITIS OF LEFT HIP: ICD-10-CM

## 2018-03-27 RX ORDER — ETODOLAC 400 MG/1
400 TABLET, FILM COATED ORAL 2 TIMES DAILY
Qty: 180 TABLET | Refills: 1 | Status: SHIPPED | OUTPATIENT
Start: 2018-03-27 | End: 2018-10-01 | Stop reason: SDUPTHER

## 2018-04-16 ENCOUNTER — OFFICE VISIT (OUTPATIENT)
Dept: FAMILY MEDICINE CLINIC | Age: 68
End: 2018-04-16
Payer: MEDICARE

## 2018-04-16 VITALS
SYSTOLIC BLOOD PRESSURE: 130 MMHG | WEIGHT: 218.1 LBS | OXYGEN SATURATION: 96 % | HEART RATE: 98 BPM | TEMPERATURE: 98 F | DIASTOLIC BLOOD PRESSURE: 80 MMHG | BODY MASS INDEX: 28.91 KG/M2 | HEIGHT: 73 IN

## 2018-04-16 DIAGNOSIS — J06.9 VIRAL URI: ICD-10-CM

## 2018-04-16 DIAGNOSIS — R05.9 COUGH: Primary | ICD-10-CM

## 2018-04-16 LAB
INFLUENZA A ANTIBODY: NORMAL
INFLUENZA B ANTIBODY: NORMAL

## 2018-04-16 PROCEDURE — 87804 INFLUENZA ASSAY W/OPTIC: CPT | Performed by: NURSE PRACTITIONER

## 2018-04-16 PROCEDURE — 99213 OFFICE O/P EST LOW 20 MIN: CPT | Performed by: NURSE PRACTITIONER

## 2018-04-16 RX ORDER — BENZONATATE 100 MG/1
100 CAPSULE ORAL 3 TIMES DAILY PRN
Qty: 21 CAPSULE | Refills: 0 | Status: SHIPPED | OUTPATIENT
Start: 2018-04-16 | End: 2018-04-23

## 2018-04-16 ASSESSMENT — ENCOUNTER SYMPTOMS
CHEST TIGHTNESS: 0
WHEEZING: 0
SORE THROAT: 0
NAUSEA: 0
EYE DISCHARGE: 0
HEMOPTYSIS: 0
ABDOMINAL PAIN: 0
FLATUS: 0
DIARRHEA: 1
VOMITING: 0
ALLERGIC/IMMUNOLOGIC NEGATIVE: 1
EYE ITCHING: 0
RHINORRHEA: 0
SHORTNESS OF BREATH: 0
EYES NEGATIVE: 1
BLOATING: 0
HEARTBURN: 0
COUGH: 1

## 2018-06-08 DIAGNOSIS — Z12.5 SCREENING FOR PROSTATE CANCER: Primary | ICD-10-CM

## 2018-06-08 DIAGNOSIS — E78.2 MIXED HYPERLIPIDEMIA: Chronic | ICD-10-CM

## 2018-06-08 DIAGNOSIS — Z00.00 ROUTINE ADULT HEALTH MAINTENANCE: ICD-10-CM

## 2018-06-12 ENCOUNTER — HOSPITAL ENCOUNTER (OUTPATIENT)
Age: 68
Setting detail: SPECIMEN
Discharge: HOME | End: 2018-06-12
Payer: MEDICARE

## 2018-06-12 DIAGNOSIS — Z12.5 SCREENING FOR PROSTATE CANCER: ICD-10-CM

## 2018-06-12 DIAGNOSIS — Z00.00 ROUTINE ADULT HEALTH MAINTENANCE: ICD-10-CM

## 2018-06-12 DIAGNOSIS — E78.2 MIXED HYPERLIPIDEMIA: Chronic | ICD-10-CM

## 2018-06-12 LAB
ALBUMIN SERPL-MCNC: 4.1 G/DL (ref 3.5–5.2)
ALBUMIN/GLOBULIN RATIO: 1.8 (ref 1–2.5)
ALP BLD-CCNC: 83 U/L (ref 40–129)
ALT SERPL-CCNC: 26 U/L (ref 5–41)
ANION GAP SERPL CALCULATED.3IONS-SCNC: 13 MMOL/L (ref 9–17)
AST SERPL-CCNC: 24 U/L
BILIRUB SERPL-MCNC: 0.65 MG/DL (ref 0.3–1.2)
BUN BLDV-MCNC: 13 MG/DL (ref 8–23)
BUN/CREAT BLD: ABNORMAL (ref 9–20)
CALCIUM SERPL-MCNC: 8.8 MG/DL (ref 8.6–10.4)
CHLORIDE BLD-SCNC: 104 MMOL/L (ref 98–107)
CHOLESTEROL/HDL RATIO: 3.2
CHOLESTEROL: 179 MG/DL
CO2: 23 MMOL/L (ref 20–31)
CREAT SERPL-MCNC: 0.59 MG/DL (ref 0.7–1.2)
GFR AFRICAN AMERICAN: >60 ML/MIN
GFR NON-AFRICAN AMERICAN: >60 ML/MIN
GFR SERPL CREATININE-BSD FRML MDRD: ABNORMAL ML/MIN/{1.73_M2}
GFR SERPL CREATININE-BSD FRML MDRD: ABNORMAL ML/MIN/{1.73_M2}
GLUCOSE BLD-MCNC: 89 MG/DL (ref 70–99)
HCT VFR BLD CALC: 44.9 % (ref 40.7–50.3)
HDLC SERPL-MCNC: 56 MG/DL
HEMOGLOBIN: 14.6 G/DL (ref 13–17)
LDL CHOLESTEROL: 101 MG/DL (ref 0–130)
MCH RBC QN AUTO: 30.3 PG (ref 25.2–33.5)
MCHC RBC AUTO-ENTMCNC: 32.5 G/DL (ref 28.4–34.8)
MCV RBC AUTO: 93.2 FL (ref 82.6–102.9)
NRBC AUTOMATED: 0 PER 100 WBC
PDW BLD-RTO: 12.4 % (ref 11.8–14.4)
PLATELET # BLD: 347 K/UL (ref 138–453)
PMV BLD AUTO: 9.5 FL (ref 8.1–13.5)
POTASSIUM SERPL-SCNC: 4.4 MMOL/L (ref 3.7–5.3)
PROSTATE SPECIFIC ANTIGEN: 0.87 UG/L
RBC # BLD: 4.82 M/UL (ref 4.21–5.77)
SODIUM BLD-SCNC: 140 MMOL/L (ref 135–144)
TOTAL PROTEIN: 6.4 G/DL (ref 6.4–8.3)
TRIGL SERPL-MCNC: 110 MG/DL
VLDLC SERPL CALC-MCNC: NORMAL MG/DL (ref 1–30)
WBC # BLD: 7.1 K/UL (ref 3.5–11.3)

## 2018-06-15 ENCOUNTER — OFFICE VISIT (OUTPATIENT)
Dept: FAMILY MEDICINE CLINIC | Age: 68
End: 2018-06-15
Payer: MEDICARE

## 2018-06-15 ENCOUNTER — TELEPHONE (OUTPATIENT)
Dept: FAMILY MEDICINE CLINIC | Age: 68
End: 2018-06-15

## 2018-06-15 VITALS
BODY MASS INDEX: 28.58 KG/M2 | SYSTOLIC BLOOD PRESSURE: 120 MMHG | HEART RATE: 80 BPM | WEIGHT: 211 LBS | RESPIRATION RATE: 16 BRPM | HEIGHT: 72 IN | DIASTOLIC BLOOD PRESSURE: 68 MMHG

## 2018-06-15 DIAGNOSIS — B96.89 BACTERIAL CONJUNCTIVITIS OF BOTH EYES: ICD-10-CM

## 2018-06-15 DIAGNOSIS — Z11.59 NEED FOR HEPATITIS C SCREENING TEST: Primary | ICD-10-CM

## 2018-06-15 DIAGNOSIS — Z00.00 ROUTINE GENERAL MEDICAL EXAMINATION AT A HEALTH CARE FACILITY: Primary | ICD-10-CM

## 2018-06-15 DIAGNOSIS — H10.9 BACTERIAL CONJUNCTIVITIS OF BOTH EYES: ICD-10-CM

## 2018-06-15 DIAGNOSIS — L23.7 ALLERGIC CONTACT DERMATITIS DUE TO PLANTS, EXCEPT FOOD: ICD-10-CM

## 2018-06-15 DIAGNOSIS — Z11.59 NEED FOR HEPATITIS C SCREENING TEST: ICD-10-CM

## 2018-06-15 PROBLEM — M16.12 PRIMARY OSTEOARTHRITIS OF LEFT HIP: Status: ACTIVE | Noted: 2018-05-22

## 2018-06-15 LAB — HEPATITIS C ANTIBODY: NONREACTIVE

## 2018-06-15 PROCEDURE — G0439 PPPS, SUBSEQ VISIT: HCPCS | Performed by: PEDIATRICS

## 2018-06-15 RX ORDER — CIPROFLOXACIN HYDROCHLORIDE 3.5 MG/ML
1 SOLUTION/ DROPS TOPICAL
Qty: 35 DROP | Refills: 0 | Status: SHIPPED | OUTPATIENT
Start: 2018-06-15 | End: 2018-06-22

## 2018-06-15 RX ORDER — TURMERIC 100 %
1000 POWDER (GRAM) MISCELLANEOUS DAILY
COMMUNITY
End: 2021-07-22

## 2018-06-15 RX ORDER — CIPROFLOXACIN HYDROCHLORIDE 3.5 MG/ML
1 SOLUTION/ DROPS TOPICAL
Qty: 35 DROP | Refills: 0 | Status: SHIPPED | OUTPATIENT
Start: 2018-06-15 | End: 2018-06-15 | Stop reason: SDUPTHER

## 2018-06-15 RX ORDER — HYDROCORTISONE VALERATE 2 MG/G
OINTMENT TOPICAL
Qty: 60 G | Refills: 0 | Status: SHIPPED | OUTPATIENT
Start: 2018-06-15 | End: 2018-07-15

## 2018-06-15 RX ORDER — OMEPRAZOLE 20 MG/1
20 CAPSULE, DELAYED RELEASE ORAL DAILY
Qty: 90 CAPSULE | Refills: 2 | Status: SHIPPED | OUTPATIENT
Start: 2018-06-15 | End: 2019-02-24 | Stop reason: SDUPTHER

## 2018-06-15 RX ORDER — HYDROCORTISONE VALERATE 2 MG/G
OINTMENT TOPICAL
Qty: 60 G | Refills: 0 | Status: SHIPPED | OUTPATIENT
Start: 2018-06-15 | End: 2018-06-15 | Stop reason: SDUPTHER

## 2018-06-15 ASSESSMENT — LIFESTYLE VARIABLES
HOW OFTEN DO YOU HAVE A DRINK CONTAINING ALCOHOL: 4
HOW OFTEN DURING THE LAST YEAR HAVE YOU FAILED TO DO WHAT WAS NORMALLY EXPECTED FROM YOU BECAUSE OF DRINKING: 0
HAVE YOU OR SOMEONE ELSE BEEN INJURED AS A RESULT OF YOUR DRINKING: 0
HOW MANY STANDARD DRINKS CONTAINING ALCOHOL DO YOU HAVE ON A TYPICAL DAY: 1
HOW OFTEN DURING THE LAST YEAR HAVE YOU FOUND THAT YOU WERE NOT ABLE TO STOP DRINKING ONCE YOU HAD STARTED: 0
HOW OFTEN DURING THE LAST YEAR HAVE YOU BEEN UNABLE TO REMEMBER WHAT HAPPENED THE NIGHT BEFORE BECAUSE YOU HAD BEEN DRINKING: 0
AUDIT-C TOTAL SCORE: 6
AUDIT TOTAL SCORE: 10
HOW OFTEN DO YOU HAVE SIX OR MORE DRINKS ON ONE OCCASION: 1
HOW OFTEN DURING THE LAST YEAR HAVE YOU HAD A FEELING OF GUILT OR REMORSE AFTER DRINKING: 0
HAS A RELATIVE, FRIEND, DOCTOR, OR ANOTHER HEALTH PROFESSIONAL EXPRESSED CONCERN ABOUT YOUR DRINKING OR SUGGESTED YOU CUT DOWN: 4
HOW OFTEN DURING THE LAST YEAR HAVE YOU NEEDED AN ALCOHOLIC DRINK FIRST THING IN THE MORNING TO GET YOURSELF GOING AFTER A NIGHT OF HEAVY DRINKING: 0

## 2018-06-15 ASSESSMENT — ANXIETY QUESTIONNAIRES: GAD7 TOTAL SCORE: 1

## 2018-06-15 ASSESSMENT — PATIENT HEALTH QUESTIONNAIRE - PHQ9: SUM OF ALL RESPONSES TO PHQ QUESTIONS 1-9: 0

## 2018-07-08 DIAGNOSIS — E78.2 MIXED HYPERLIPIDEMIA: Chronic | ICD-10-CM

## 2018-07-09 NOTE — TELEPHONE ENCOUNTER
LOV 11/14/17  LRF 1/9/18  RTO 6 months    Health Maintenance   Topic Date Due    Shingles Vaccine (1 of 2 - 2 Dose Series) 09/10/2000    Flu vaccine (1) 09/01/2018    DTaP/Tdap/Td vaccine (2 - Td) 11/23/2019    Lipid screen  06/12/2023    Colon cancer screen colonoscopy  03/22/2027    Pneumococcal low/med risk  Completed    AAA screen  Completed    Hepatitis C screen  Completed             (applicable per patient's age: Cancer Screenings, Depression Screening, Fall Risk Screening, Immunizations)    LDL Cholesterol (mg/dL)   Date Value   06/12/2018 101     LDL Calculated (mg/dL)   Date Value   05/15/2014 115     AST (U/L)   Date Value   06/12/2018 24     ALT (U/L)   Date Value   06/12/2018 26     BUN (mg/dL)   Date Value   06/12/2018 13      (goal A1C is < 7)   (goal LDL is <100) need 30-50% reduction from baseline     BP Readings from Last 3 Encounters:   06/15/18 120/68   04/16/18 130/80   03/14/18 132/78    (goal /80)      All Future Testing planned in CarePATH:      Next Visit Date:  No future appointments.          Patient Active Problem List:     GERD (gastroesophageal reflux disease)     Hyperlipidemia     External hemorrhoids     Impotence of organic origin     Nocturia     Arthropathy     Benign prostatic hyperplasia with urinary obstruction     Allergic rhinitis     Rotator cuff arthropathy     Primary osteoarthritis of left hip

## 2018-07-10 ENCOUNTER — TELEPHONE (OUTPATIENT)
Dept: PHARMACY | Facility: CLINIC | Age: 68
End: 2018-07-10

## 2018-07-10 DIAGNOSIS — Z96.642 H/O TOTAL HIP ARTHROPLASTY, LEFT: Primary | ICD-10-CM

## 2018-07-10 PROCEDURE — 1111F DSCHRG MED/CURRENT MED MERGE: CPT

## 2018-07-10 RX ORDER — ATORVASTATIN CALCIUM 10 MG/1
10 TABLET, FILM COATED ORAL DAILY
Qty: 90 TABLET | Refills: 1 | Status: SHIPPED | OUTPATIENT
Start: 2018-07-10 | End: 2019-01-06 | Stop reason: SDUPTHER

## 2018-07-10 RX ORDER — ASPIRIN 325 MG
325 TABLET ORAL 2 TIMES DAILY
COMMUNITY
Start: 2018-06-26 | End: 2018-08-13

## 2018-07-10 RX ORDER — CETIRIZINE HYDROCHLORIDE 5 MG/1
10 TABLET ORAL DAILY
COMMUNITY

## 2018-07-10 RX ORDER — M-VIT,TX,IRON,MINS/CALC/FOLIC 27MG-0.4MG
1 TABLET ORAL DAILY
COMMUNITY
End: 2019-10-07 | Stop reason: ALTCHOICE

## 2018-07-10 RX ORDER — NICOTINE 14MG/24HR
1 PATCH, TRANSDERMAL 24 HOURS TRANSDERMAL DAILY
COMMUNITY

## 2018-10-01 DIAGNOSIS — M16.12 PRIMARY OSTEOARTHRITIS OF LEFT HIP: ICD-10-CM

## 2018-10-01 RX ORDER — ETODOLAC 400 MG/1
400 TABLET, FILM COATED ORAL 2 TIMES DAILY
Qty: 180 TABLET | Refills: 1 | Status: SHIPPED | OUTPATIENT
Start: 2018-10-01 | End: 2019-03-30 | Stop reason: SDUPTHER

## 2018-10-01 NOTE — TELEPHONE ENCOUNTER
ZNX:[id number]  MDW:85-0-31  [facility]:[id number]  Health Maintenance   Topic Date Due    Shingles Vaccine (1 of 2 - 2 Dose Series) 09/10/2000    Flu vaccine (1) 09/01/2018    DTaP/Tdap/Td vaccine (2 - Td) 11/23/2019    Lipid screen  06/12/2023    Colon cancer screen colonoscopy  03/22/2027    Pneumococcal low/med risk  Completed    AAA screen  Completed    Hepatitis C screen  Completed             (applicable per patient's age: Cancer Screenings, Depression Screening, Fall Risk Screening, Immunizations)    LDL Cholesterol (mg/dL)   Date Value   06/12/2018 101     LDL Calculated (mg/dL)   Date Value   05/15/2014 115     AST (U/L)   Date Value   06/12/2018 24     ALT (U/L)   Date Value   06/12/2018 26     BUN (mg/dL)   Date Value   06/12/2018 13      (goal A1C is < 7)   (goal LDL is <100) need 30-50% reduction from baseline     BP Readings from Last 3 Encounters:   08/13/18 (!) 142/73   06/15/18 120/68   04/16/18 130/80    (goal /80)      All Future Testing planned in CarePATH:      Next Visit Date:  Future Appointments  Date Time Provider Roldan Shaikh   12/7/2018 9:40 AM MD Naila Nguyễn [address] [facility]            Patient Active Problem List:     GERD (gastroesophageal reflux disease)     Hyperlipidemia     External hemorrhoids     Impotence of organic origin     Nocturia     Arthropathy     Benign prostatic hyperplasia with urinary obstruction     Allergic rhinitis     Rotator cuff arthropathy     Primary osteoarthritis of left hip

## 2018-10-24 ENCOUNTER — NURSE ONLY (OUTPATIENT)
Dept: FAMILY MEDICINE CLINIC | Age: 68
End: 2018-10-24
Payer: MEDICARE

## 2018-10-24 DIAGNOSIS — Z23 NEED FOR INFLUENZA VACCINATION: Primary | ICD-10-CM

## 2018-10-24 PROCEDURE — G0008 ADMIN INFLUENZA VIRUS VAC: HCPCS | Performed by: PEDIATRICS

## 2018-10-24 PROCEDURE — 90688 IIV4 VACCINE SPLT 0.5 ML IM: CPT | Performed by: PEDIATRICS

## 2018-12-07 ENCOUNTER — OFFICE VISIT (OUTPATIENT)
Dept: FAMILY MEDICINE CLINIC | Age: 68
End: 2018-12-07
Payer: MEDICARE

## 2018-12-07 VITALS
SYSTOLIC BLOOD PRESSURE: 140 MMHG | HEART RATE: 76 BPM | WEIGHT: 215 LBS | DIASTOLIC BLOOD PRESSURE: 88 MMHG | TEMPERATURE: 98.4 F | BODY MASS INDEX: 29.16 KG/M2

## 2018-12-07 DIAGNOSIS — E78.2 MIXED HYPERLIPIDEMIA: Primary | Chronic | ICD-10-CM

## 2018-12-07 DIAGNOSIS — K21.9 GASTROESOPHAGEAL REFLUX DISEASE, ESOPHAGITIS PRESENCE NOT SPECIFIED: ICD-10-CM

## 2018-12-07 PROBLEM — M16.12 PRIMARY OSTEOARTHRITIS OF LEFT HIP: Status: RESOLVED | Noted: 2018-05-22 | Resolved: 2018-12-07

## 2018-12-07 PROCEDURE — 99213 OFFICE O/P EST LOW 20 MIN: CPT | Performed by: PEDIATRICS

## 2018-12-07 ASSESSMENT — ENCOUNTER SYMPTOMS
CONSTIPATION: 0
WHEEZING: 0
SHORTNESS OF BREATH: 0
NAUSEA: 0
DIARRHEA: 0
COUGH: 1

## 2018-12-07 NOTE — PROGRESS NOTES
 Multiple Vitamins-Minerals (THERAPEUTIC MULTIVITAMIN-MINERALS) tablet Take 1 tablet by mouth daily      cetirizine (ZYRTEC) 5 MG tablet Take 10 mg by mouth daily      Turmeric POWD Take by mouth daily 1 scoop      omeprazole (PRILOSEC) 20 MG delayed release capsule Take 1 capsule by mouth Daily 90 capsule 2    FIBER PO Take 3 tablets by mouth daily       Garlic 8121 MG CAPS Take 1 capsule by mouth daily. No current facility-administered medications for this visit. Patient Active Problem List   Diagnosis    GERD (gastroesophageal reflux disease)    Hyperlipidemia    External hemorrhoids    Impotence of organic origin    Nocturia    Arthropathy    Benign prostatic hyperplasia with urinary obstruction    Allergic rhinitis    Rotator cuff arthropathy    Primary osteoarthritis of left hip       Social History   Substance Use Topics    Smoking status: Former Smoker     Packs/day: 0.30     Years: 1.00     Types: Cigarettes     Start date: 4/27/1970     Quit date: 1/1/1971    Smokeless tobacco: Never Used    Alcohol use 8.4 oz/week     14 Cans of beer per week      Comment: 2 drinks per day       Review of Systems   Constitutional: Negative for fatigue and fever. Respiratory: Positive for cough. Negative for shortness of breath and wheezing. Cardiovascular: Negative for chest pain and leg swelling. Gastrointestinal: Negative for constipation, diarrhea and nausea. Endocrine: Negative for polydipsia and polyuria. Genitourinary:        Mild slow flow. Empties bladder ok. But has to urinate shortly after last time. Up at night a time or two to urinate. Musculoskeletal: Positive for arthralgias. Neurological: Negative for dizziness and numbness. Psychiatric/Behavioral: Negative. Objective:   Physical Exam   Constitutional: He is oriented to person, place, and time. He appears well-developed. No distress. Overweight.      HENT:   Head: Normocephalic and

## 2018-12-07 NOTE — PATIENT INSTRUCTIONS
Today's office visit was for the following diagnosis  No diagnosis found. The treatment plan consists of the following     Continue current medications  Call if still need handicap placard for left hip. Trial of vitamin supplement to help with energy. Wellness visit in 6 months  Work on calorie reduction and weight loss          All Future Testing planned in [facility] of office experience to help improve our quality of service. Please note that you may receive a patient satisfaction survey either by [facility] postal mail service or email. We would appreciate you taking the time to complete and return the survey. We value your opinion and will use your comments to help improve our care and  service to our patients    Health Maintenance  Please also note the list of Health maintenance items for you and their due dates. Help us continue to provide excellent health care by keeping these items up to date. We will be happy to assist you in getting this completed in a timely fashion. Health Maintenance Due   Topic Date Due    Shingles Vaccine (1 of 2 - 2 Dose Series) 09/10/2000         After-Hours Urgent [address] -  24 hours emergency services daily  Cele [facility] 36.      Patient Education        Starting a Weight Loss Plan: Care Instructions  Your Care Instructions    If you are thinking about losing weight, it can be hard to know where to start. Your doctor can help you set up a weight loss plan that best meets your needs. You may want to take a class on nutrition or exercise, or join a weight loss support group. If you have questions about how to make changes to your eating or exercise habits, ask your doctor about seeing a registered dietitian or an exercise specialist.  It can be a big challenge to lose weight. But you do not have to make huge changes at once. Make small changes, and stick with them.  When those changes become habit, add a few more changes. If you do not think you are ready to make changes right now, try to pick a date in the future. Make an appointment to see your doctor to discuss whether the time is right for you to start a plan. Follow-up care is a key part of your treatment and safety. Be sure to make and go to all appointments, and call your doctor if you are having problems. It's also a good idea to know your test results and keep a list of the medicines you take. How can you care for yourself at home? · Set realistic goals. Many people expect to lose much more weight than is likely. A weight loss of 5% to 10% of your body weight may be enough to improve your health. · Get family and friends involved to provide support. Talk to them about why you are trying to lose weight, and ask them to help. They can help by participating in exercise and having meals with you, even if they may be eating something different. · Find what works best for you. If you do not have time or do not like to cook, a program that offers meal replacement bars or shakes may be better for you. Or if you like to prepare meals, finding a plan that includes daily menus and recipes may be best.  · Ask your doctor about other health professionals who can help you achieve your weight loss goals. ? A dietitian can help you make healthy changes in your diet. ? An exercise specialist or  can help you develop a safe and effective exercise program.  ? A counselor or psychiatrist can help you cope with issues such as depression, anxiety, or family problems that can make it hard to focus on weight loss. · Consider joining a support group for people who are trying to lose weight. Your doctor can suggest groups in your area. Where can you learn more? Go to https://corrine.Biart. org and sign in to your OpenDNS account. Enter X264 in the [facility] box to learn more about \"Starting a Weight Loss Plan: Care Instructions. \"     If you do not have an account, please click on the \"Sign Up Now\" link. Current as of: June 26, 2018  Content Version: 11.8  © 7786-2137 Healthwise, Incorporated. Care instructions adapted under license by [facility] ([facility]). If you have questions about a medical condition or this instruction, always ask your healthcare professional. [address] any warranty or liability for your use of this information.

## 2018-12-27 ENCOUNTER — HOSPITAL ENCOUNTER (OUTPATIENT)
Dept: CT IMAGING | Facility: CLINIC | Age: 68
Discharge: HOME | End: 2018-12-29
Payer: MEDICARE

## 2018-12-27 ENCOUNTER — HOSPITAL ENCOUNTER (OUTPATIENT)
Facility: CLINIC | Age: 68
Discharge: HOME | End: 2018-12-27
Payer: MEDICARE

## 2018-12-27 ENCOUNTER — OFFICE VISIT (OUTPATIENT)
Dept: FAMILY MEDICINE CLINIC | Age: 68
End: 2018-12-27
Payer: MEDICARE

## 2018-12-27 VITALS
SYSTOLIC BLOOD PRESSURE: 136 MMHG | BODY MASS INDEX: 29.97 KG/M2 | TEMPERATURE: 98 F | DIASTOLIC BLOOD PRESSURE: 80 MMHG | WEIGHT: 221 LBS | HEART RATE: 82 BPM | RESPIRATION RATE: 18 BRPM

## 2018-12-27 DIAGNOSIS — K42.9 UMBILICAL HERNIA WITHOUT OBSTRUCTION AND WITHOUT GANGRENE: Primary | ICD-10-CM

## 2018-12-27 DIAGNOSIS — K42.9 UMBILICAL HERNIA WITHOUT OBSTRUCTION AND WITHOUT GANGRENE: ICD-10-CM

## 2018-12-27 LAB
ANION GAP SERPL CALCULATED.3IONS-SCNC: 11 MMOL/L (ref 9–17)
BUN BLDV-MCNC: 18 MG/DL (ref 8–23)
BUN/CREAT BLD: ABNORMAL (ref 9–20)
CALCIUM SERPL-MCNC: 9.6 MG/DL (ref 8.6–10.4)
CHLORIDE BLD-SCNC: 103 MMOL/L (ref 98–107)
CO2: 29 MMOL/L (ref 20–31)
CREAT SERPL-MCNC: 0.7 MG/DL (ref 0.7–1.2)
GFR AFRICAN AMERICAN: >60 ML/MIN
GFR NON-AFRICAN AMERICAN: >60 ML/MIN
GFR SERPL CREATININE-BSD FRML MDRD: ABNORMAL ML/MIN/{1.73_M2}
GFR SERPL CREATININE-BSD FRML MDRD: ABNORMAL ML/MIN/{1.73_M2}
GLUCOSE BLD-MCNC: 147 MG/DL (ref 70–99)
POTASSIUM SERPL-SCNC: 5 MMOL/L (ref 3.7–5.3)
SODIUM BLD-SCNC: 143 MMOL/L (ref 135–144)

## 2018-12-27 PROCEDURE — 99213 OFFICE O/P EST LOW 20 MIN: CPT | Performed by: NURSE PRACTITIONER

## 2018-12-27 PROCEDURE — 36415 COLL VENOUS BLD VENIPUNCTURE: CPT

## 2018-12-27 PROCEDURE — 2580000003 HC RX 258: Performed by: NURSE PRACTITIONER

## 2018-12-27 PROCEDURE — 6360000004 HC RX CONTRAST MEDICATION: Performed by: NURSE PRACTITIONER

## 2018-12-27 PROCEDURE — 74177 CT ABD & PELVIS W/CONTRAST: CPT

## 2018-12-27 PROCEDURE — 80048 BASIC METABOLIC PNL TOTAL CA: CPT

## 2018-12-27 RX ORDER — 0.9 % SODIUM CHLORIDE 0.9 %
70 INTRAVENOUS SOLUTION INTRAVENOUS ONCE
Status: COMPLETED | OUTPATIENT
Start: 2018-12-27 | End: 2018-12-27

## 2018-12-27 RX ORDER — SODIUM CHLORIDE 0.9 % (FLUSH) 0.9 %
10 SYRINGE (ML) INJECTION PRN
Status: DISCONTINUED | OUTPATIENT
Start: 2018-12-27 | End: 2018-12-30 | Stop reason: HOSPADM

## 2018-12-27 RX ADMIN — IOPAMIDOL 75 ML: 755 INJECTION, SOLUTION INTRAVENOUS at 15:54

## 2018-12-27 RX ADMIN — SODIUM CHLORIDE 70 ML: 9 INJECTION, SOLUTION INTRAVENOUS at 15:54

## 2018-12-27 RX ADMIN — Medication 10 ML: at 15:54

## 2018-12-27 ASSESSMENT — ENCOUNTER SYMPTOMS
DIARRHEA: 0
NAUSEA: 0
SHORTNESS OF BREATH: 0
VOMITING: 0
COLOR CHANGE: 1
CHEST TIGHTNESS: 0
ABDOMINAL PAIN: 1
ANAL BLEEDING: 0
BLOOD IN STOOL: 0
CONSTIPATION: 0
ABDOMINAL DISTENTION: 1

## 2018-12-27 NOTE — PATIENT INSTRUCTIONS
Patient Education        Hernia: Care Instructions  Your Care Instructions    A hernia develops when tissue bulges through a weak spot in the wall of your belly. The groin area and the navel are common areas for a hernia. A hernia can also develop near the area of a surgery you had before. Pressure from lifting, straining, or coughing can tear the weak area, causing the hernia to bulge and be painful. If you cannot push a hernia back into place, the tissue may become trapped outside the belly wall. If the hernia gets twisted and loses its blood supply, it will swell and die. This is called a strangulated hernia. It usually causes a lot of pain. It needs treatment right away. Some hernias need to be repaired to prevent a strangulated hernia. If your hernia causes symptoms or is large, you may need surgery. Follow-up care is a key part of your treatment and safety. Be sure to make and go to all appointments, and call your doctor if you are having problems. It's also a good idea to know your test results and keep a list of the medicines you take. How can you care for yourself at home? · Take care when lifting heavy objects. · Stay at a healthy weight. · Do not smoke. Smoking can cause coughing, which can cause your hernia to bulge. If you need help quitting, talk to your doctor about stop-smoking programs and medicines. These can increase your chances of quitting for good. · Talk with your doctor before wearing a corset or truss for a hernia. These devices are not recommended for treating hernias and sometimes can do more harm than good. There may be certain situations when your doctor thinks a truss would work, but these are rare. When should you call for help?   Call your doctor now or seek immediate medical care if:    · You have new or worse belly pain.     · You are vomiting.     · You cannot pass stools or gas.     · You cannot push the hernia back into place with gentle pressure when you are lying down.     · The area over the hernia turns red or becomes tender.    Watch closely for changes in your health, and be sure to contact your doctor if you have any problems. Where can you learn more? Go to https://CalcivispepradeepMangiaeb.Taste Guru. org and sign in to your InGameNow account. Enter C129 in the [facility] box to learn more about \"Hernia: Care Instructions. \"     If you do not have an account, please click on the \"Sign Up Now\" link. Current as of: March 28, 2018  Content Version: 11.8  © 3349-7873 Healthwise, Incorporated. Care instructions adapted under license by [facility] ([facility]). If you have questions about a medical condition or this instruction, always ask your healthcare professional. [address] any warranty or liability for your use of this information.

## 2018-12-27 NOTE — PROGRESS NOTES
DepressionSeverity: 1-4 = Minimal depression, 5-9 = Mild depression, 10-14 = Moderate depression, 15-19 = Moderately severe depression, 20-27 = Severe depression    Current Outpatient Prescriptions   Medication Sig Dispense Refill    etodolac (LODINE) 400 MG tablet Take 1 tablet by mouth 2 times daily 180 tablet 1    atorvastatin (LIPITOR) 10 MG tablet Take 1 tablet by mouth daily 90 tablet 1    Saccharomyces boulardii (PROBIOTIC) 250 MG CAPS Take 1 capsule by mouth daily      Multiple Vitamins-Minerals (THERAPEUTIC MULTIVITAMIN-MINERALS) tablet Take 1 tablet by mouth daily      cetirizine (ZYRTEC) 5 MG tablet Take 10 mg by mouth daily      Turmeric POWD Take by mouth daily 1 scoop      omeprazole (PRILOSEC) 20 MG delayed release capsule Take 1 capsule by mouth Daily 90 capsule 2    FIBER PO Take 3 tablets by mouth daily       Garlic 9631 MG CAPS Take 1 capsule by mouth daily.  tamsulosin (FLOMAX) 0.4 MG capsule Take 1 capsule by mouth daily 90 capsule 3     No current facility-administered medications for this visit. Facility-Administered Medications Ordered in Other Visits   Medication Dose Route Frequency Provider Last Rate Last Dose    sodium chloride flush 0.9 % injection 10 mL  10 mL Intravenous PRN DARIA Hopkins - CNP   10 mL at 12/27/18 1554       HPI    Alyssia Kenny presents to the office today with concerns of abdominal pain. Tells me all of a sudden his bellybutton became hard and turned include. Lasted for quite a long time. Almost went to the emergency room. Pain did resolve. Tells me that he has a history of hernias. He has had 3 inguinal hernia repairs with mesh. Continues to have some discomfort in his abdomen. Denies diarrhea or constipation. Uncomfortable with straining, coughing, sneezing. Review of Systems   Constitutional: Negative for fatigue and fever. Respiratory: Negative for chest tightness and shortness of breath.     Gastrointestinal: Positive for

## 2019-01-02 DIAGNOSIS — K42.9 UMBILICAL HERNIA WITHOUT OBSTRUCTION AND WITHOUT GANGRENE: Primary | ICD-10-CM

## 2019-01-06 DIAGNOSIS — E78.2 MIXED HYPERLIPIDEMIA: Chronic | ICD-10-CM

## 2019-01-08 RX ORDER — ATORVASTATIN CALCIUM 10 MG/1
10 TABLET, FILM COATED ORAL DAILY
Qty: 90 TABLET | Refills: 1 | Status: SHIPPED | OUTPATIENT
Start: 2019-01-08 | End: 2019-09-09 | Stop reason: SDUPTHER

## 2019-02-26 RX ORDER — OMEPRAZOLE 20 MG/1
20 CAPSULE, DELAYED RELEASE ORAL DAILY
Qty: 90 CAPSULE | Refills: 2 | Status: SHIPPED | OUTPATIENT
Start: 2019-02-26 | End: 2019-12-02 | Stop reason: SDUPTHER

## 2019-04-08 ENCOUNTER — TELEPHONE (OUTPATIENT)
Dept: FAMILY MEDICINE CLINIC | Age: 69
End: 2019-04-08

## 2019-04-08 DIAGNOSIS — Z12.5 SCREENING FOR PROSTATE CANCER: ICD-10-CM

## 2019-04-08 DIAGNOSIS — E78.2 MIXED HYPERLIPIDEMIA: ICD-10-CM

## 2019-04-08 DIAGNOSIS — Z00.00 ROUTINE GENERAL MEDICAL EXAMINATION AT A HEALTH CARE FACILITY: Primary | ICD-10-CM

## 2019-05-17 ENCOUNTER — OFFICE VISIT (OUTPATIENT)
Dept: FAMILY MEDICINE CLINIC | Age: 69
End: 2019-05-17
Payer: MEDICARE

## 2019-05-17 VITALS
BODY MASS INDEX: 29.7 KG/M2 | TEMPERATURE: 98.3 F | HEART RATE: 82 BPM | SYSTOLIC BLOOD PRESSURE: 130 MMHG | DIASTOLIC BLOOD PRESSURE: 76 MMHG | WEIGHT: 219 LBS | RESPIRATION RATE: 18 BRPM

## 2019-05-17 DIAGNOSIS — M54.41 CHRONIC BILATERAL LOW BACK PAIN WITH BILATERAL SCIATICA: ICD-10-CM

## 2019-05-17 DIAGNOSIS — M51.36 LUMBAR DEGENERATIVE DISC DISEASE: ICD-10-CM

## 2019-05-17 DIAGNOSIS — M54.42 CHRONIC BILATERAL LOW BACK PAIN WITH BILATERAL SCIATICA: ICD-10-CM

## 2019-05-17 DIAGNOSIS — S76.319A HAMSTRING MUSCLE STRAIN, UNSPECIFIED LATERALITY, INITIAL ENCOUNTER: ICD-10-CM

## 2019-05-17 DIAGNOSIS — G89.29 CHRONIC BILATERAL LOW BACK PAIN WITH BILATERAL SCIATICA: ICD-10-CM

## 2019-05-17 DIAGNOSIS — M79.18 BILATERAL BUTTOCK PAIN: Primary | ICD-10-CM

## 2019-05-17 PROCEDURE — 99214 OFFICE O/P EST MOD 30 MIN: CPT | Performed by: PEDIATRICS

## 2019-05-17 RX ORDER — TIZANIDINE 4 MG/1
4 TABLET ORAL 3 TIMES DAILY
Qty: 30 TABLET | Refills: 1 | Status: SHIPPED | OUTPATIENT
Start: 2019-05-17 | End: 2019-06-13 | Stop reason: SDUPTHER

## 2019-05-17 RX ORDER — METHYLPREDNISOLONE 4 MG/1
TABLET ORAL
Qty: 1 KIT | Refills: 0 | Status: SHIPPED | OUTPATIENT
Start: 2019-05-17 | End: 2019-05-23

## 2019-05-17 ASSESSMENT — ENCOUNTER SYMPTOMS
DIARRHEA: 0
RHINORRHEA: 0
PHOTOPHOBIA: 0
EYE REDNESS: 0
SINUS PAIN: 0
SINUS PRESSURE: 0
ABDOMINAL PAIN: 0
WHEEZING: 0
COLOR CHANGE: 0
STRIDOR: 0
VOMITING: 0
BACK PAIN: 1
EYE DISCHARGE: 0
SHORTNESS OF BREATH: 0
NAUSEA: 0
COUGH: 0

## 2019-05-17 ASSESSMENT — PATIENT HEALTH QUESTIONNAIRE - PHQ9
2. FEELING DOWN, DEPRESSED OR HOPELESS: 0
1. LITTLE INTEREST OR PLEASURE IN DOING THINGS: 0
SUM OF ALL RESPONSES TO PHQ9 QUESTIONS 1 & 2: 0
SUM OF ALL RESPONSES TO PHQ QUESTIONS 1-9: 0
SUM OF ALL RESPONSES TO PHQ QUESTIONS 1-9: 0

## 2019-05-17 NOTE — PROGRESS NOTES
Subjective:      Patient ID: Cornelia Thibodeaux is a 76 y.o. male. Visit Information    Have you changed or started any medications since your last visit including any over-the-counter medicines, vitamins, or herbal medicines? no   Are you having any side effects from any of your medications? -  no  Have you stopped taking any of your medications? Is so, why? -  no    Have you seen any other physician or provider since your last visit? No  Have you had any other diagnostic tests since your last visit? No  Have you been seen in the emergency room and/or had an admission to a hospital since we last saw you? No  Have you had your routine dental cleaning in the past 6 months? yes -     Have you activated your [email or web address] account? If not, what are your barriers?  Yes     Patient Care Team:  DARIA Field - CNP as PCP - General (Certified Nurse Practitioner)  Eva Matos MD as PCP - [facility] Attributed Provider  Mariela Kuhn MD as Consulting Physician (Urology)  Melina Guzman as Consulting Physician (Ophthalmology)  Courtney Arteaga MD (Gastroenterology)  Marissa Solano MD as Consulting Physician (Orthopedic Surgery)    Medical History Review  Past Medical, Family, and Social History reviewed and does contribute to the patient presenting condition    Health Maintenance   Topic Date Due    Shingles Vaccine (1 of 2) 09/10/2000    DTaP/Tdap/Td vaccine (2 - Td) 11/23/2019    Diabetes screen  04/26/2020    Lipid screen  06/12/2023    Colon cancer screen colonoscopy  03/22/2027    Flu vaccine  Completed    Pneumococcal 65+ years Vaccine  Completed    AAA screen  Completed    Hepatitis C screen  Completed       PHQ Scores 5/17/2019 6/15/2018 5/15/2017 4/27/2016 9/11/2015   PHQ2 Score 0 0 0 0 0   PHQ9 Score 0 0 0 0 0     Interpretation of Total Score DepressionSeverity: 1-4 = Minimal depression, 5-9 = Mild depression, 10-14 = Moderate depression, 15-19 = Moderately severe depression, 20-27 = Severe depression    Current Outpatient Medications   Medication Sig Dispense Refill    methylPREDNISolone (MEDROL, ARCENIO,) 4 MG tablet Take by mouth as instructed by packet. 1 kit 0    tiZANidine (ZANAFLEX) 4 MG tablet Take 1 tablet by mouth 3 times daily 30 tablet 1    etodolac (LODINE) 400 MG tablet Take 1 tablet by mouth 2 times daily 180 tablet 0    omeprazole (PRILOSEC) 20 MG delayed release capsule Take 1 capsule by mouth Daily 90 capsule 2    atorvastatin (LIPITOR) 10 MG tablet Take 1 tablet by mouth daily 90 tablet 1    tamsulosin (FLOMAX) 0.4 MG capsule Take 1 capsule by mouth daily 90 capsule 3    Saccharomyces boulardii (PROBIOTIC) 250 MG CAPS Take 1 capsule by mouth daily      Multiple Vitamins-Minerals (THERAPEUTIC MULTIVITAMIN-MINERALS) tablet Take 1 tablet by mouth daily      cetirizine (ZYRTEC) 5 MG tablet Take 10 mg by mouth daily      Turmeric POWD Take by mouth daily 1 scoop      FIBER PO Take 3 tablets by mouth daily       Garlic 7300 MG CAPS Take 1 capsule by mouth daily. No current facility-administered medications for this visit. HPI:      Patient presents today for evaluation of bilateral buttock pain and hamstring pain along with low back pain. Patient states that he does have chronic low back pain secondary to lumbar degenerative disc disease and he was at the [facility] eating supper on Tuesday and he felt an aching sensation in his buttocks. He states that this continued all night long and did keep him awake. He states that on Wednesday he did let up a little bit but last night at 4:00 in the morning he woke up with significant pain again. He states that if he bends over and tries to stand back up he will have a sharp pain in the area where his hamstring meets his buttock and he can hardly stand up. He states this feels like a burning pain. There is no radiation of the pain down the legs.   There is some occasional numbness that he notices in the left ankle but this is chronic and not worsened. He denies any bowel or bladder incontinence. He did take some ibuprofen 800 yesterday which was mildly helpful. He did use some heat initially and this made his pain worse and then he did try ice and this was somewhat helpful. cmw      Leg Pain    The incident occurred more than 1 week ago. There was no injury mechanism. The pain is present in the left leg and right leg. The quality of the pain is described as aching and cramping. The pain is at a severity of 6/10. The pain is moderate. The pain has been constant since onset. Associated symptoms include numbness. Pertinent negatives include no inability to bear weight, loss of motion, loss of sensation, muscle weakness or tingling. He reports no foreign bodies present. The symptoms are aggravated by movement, palpation and weight bearing. He has tried heat, ice, NSAIDs and rest for the symptoms. The treatment provided no relief. Review of Systems   Constitutional: Negative for appetite change, fatigue and fever. HENT: Negative for congestion, nosebleeds, postnasal drip, rhinorrhea, sinus pressure, sinus pain and tinnitus. Eyes: Negative for photophobia, discharge and redness. Respiratory: Negative for cough, shortness of breath, wheezing and stridor. Cardiovascular: Negative for chest pain, palpitations and leg swelling. Gastrointestinal: Negative for abdominal pain, diarrhea, nausea and vomiting. Endocrine: Negative for polydipsia, polyphagia and polyuria. Musculoskeletal: Positive for arthralgias (buttock pain), back pain and myalgias. Skin: Negative for color change and rash. Allergic/Immunologic: Negative for immunocompromised state. Neurological: Positive for numbness. Negative for dizziness, tingling and light-headedness. Hematological: Negative for adenopathy. Does not bruise/bleed easily.        Objective:     /76 (Site: Right Upper Arm, Position: Sitting, Cuff Size: Medium Adult)   Pulse 82 Temp 98.3 °F (36.8 °C) (Tympanic)   Resp 18   Wt 219 lb (99.3 kg)   BMI 29.70 kg/m²        Physical Exam   Constitutional: He is oriented to person, place, and time. He appears well-developed and well-nourished. No distress. Musculoskeletal: He exhibits no edema. Right hip: Normal.        Left hip: Normal.        Right knee: Normal.        Left knee: Normal.        Lumbar back: He exhibits decreased range of motion, tenderness and pain (with flexion and extension). Legs:  Neurological: He is alert and oriented to person, place, and time. He displays normal reflexes. No cranial nerve deficit. He exhibits normal muscle tone. Coordination normal.   Skin: He is not diaphoretic. Nursing note and vitals reviewed. Assessment:      Diagnosis Orders   1. Bilateral buttock pain  tiZANidine (ZANAFLEX) 4 MG tablet   2. Hamstring muscle strain, unspecified laterality, initial encounter  methylPREDNISolone (MEDROL, ARCENIO,) 4 MG tablet   3. Chronic bilateral low back pain with bilateral sciatica  XR LUMBAR SPINE (2-3 VIEWS)   4. Lumbar degenerative disc disease  XR LUMBAR SPINE (2-3 VIEWS)       Plan:     Proceed with start Medrol Dosepak and Zanaflex as prescribed  Obtain lumbar spine x-ray  Consider lumbar MRI without contrast after review of x-ray  Consider physical therapy consult and evaluation  Continue etodolac twice daily  Continue ice or heat  Limit activity over the weekend to avoid pain  Increase fluid intake to ensure adequate hydration  Call with concerns or worsening symptoms    Rudy Collier received counseling on the following healthy behaviors: nutrition, exercise and medication adherence  Reviewed prior labs and health maintenance. Continue current medications, diet and exercise. Discussed use, benefit, and side effects of prescribed medications. Barriers to medication compliance addressed. Patient given educational materials - see patient instructions. All patient questions answered. Patient voiced understanding.        Electronically signed by Layton Love MD on 5/17/2019 at 1:44 PM

## 2019-05-20 ENCOUNTER — HOSPITAL ENCOUNTER (OUTPATIENT)
Facility: CLINIC | Age: 69
Discharge: HOME | End: 2019-05-22
Payer: MEDICARE

## 2019-05-20 ENCOUNTER — HOSPITAL ENCOUNTER (OUTPATIENT)
Dept: GENERAL RADIOLOGY | Facility: CLINIC | Age: 69
Discharge: HOME | End: 2019-05-22
Payer: MEDICARE

## 2019-05-20 DIAGNOSIS — M54.42 CHRONIC BILATERAL LOW BACK PAIN WITH BILATERAL SCIATICA: ICD-10-CM

## 2019-05-20 DIAGNOSIS — M54.41 CHRONIC BILATERAL LOW BACK PAIN WITH BILATERAL SCIATICA: ICD-10-CM

## 2019-05-20 DIAGNOSIS — M51.36 LUMBAR DEGENERATIVE DISC DISEASE: ICD-10-CM

## 2019-05-20 DIAGNOSIS — G89.29 CHRONIC BILATERAL LOW BACK PAIN WITH BILATERAL SCIATICA: ICD-10-CM

## 2019-05-20 PROCEDURE — 72100 X-RAY EXAM L-S SPINE 2/3 VWS: CPT

## 2019-05-21 DIAGNOSIS — R93.89 ABNORMAL X-RAY: ICD-10-CM

## 2019-05-21 DIAGNOSIS — M43.10 ANTEROLISTHESIS: ICD-10-CM

## 2019-05-21 DIAGNOSIS — M43.10 RETROLISTHESIS: ICD-10-CM

## 2019-05-21 DIAGNOSIS — M54.16 LUMBAR BACK PAIN WITH RADICULOPATHY AFFECTING LOWER EXTREMITY: Primary | ICD-10-CM

## 2019-05-22 NOTE — PROGRESS NOTES
I added the diagnoses I recorded on xray results - these do need to be on the order for the MRI in order to get this approved hopefully through his insurance. Order signed. See xray order for documentation from VANESSA HEBERT. Will he have this done at [facility]? Or other facility? Please fax if necessary.

## 2019-05-24 ENCOUNTER — HOSPITAL ENCOUNTER (OUTPATIENT)
Dept: MRI IMAGING | Facility: CLINIC | Age: 69
Discharge: HOME | End: 2019-05-26
Payer: MEDICARE

## 2019-05-24 DIAGNOSIS — M54.16 LUMBAR BACK PAIN WITH RADICULOPATHY AFFECTING LOWER EXTREMITY: ICD-10-CM

## 2019-05-24 DIAGNOSIS — M43.10 RETROLISTHESIS: ICD-10-CM

## 2019-05-24 DIAGNOSIS — M43.10 ANTEROLISTHESIS: ICD-10-CM

## 2019-05-24 DIAGNOSIS — R93.89 ABNORMAL X-RAY: ICD-10-CM

## 2019-05-24 PROCEDURE — 72148 MRI LUMBAR SPINE W/O DYE: CPT

## 2019-05-30 ENCOUNTER — HOSPITAL ENCOUNTER (OUTPATIENT)
Age: 69
Setting detail: SPECIMEN
Discharge: HOME | End: 2019-05-30
Payer: MEDICARE

## 2019-05-30 DIAGNOSIS — Z00.00 ROUTINE GENERAL MEDICAL EXAMINATION AT A HEALTH CARE FACILITY: ICD-10-CM

## 2019-05-30 DIAGNOSIS — M43.10 RETROLISTHESIS: ICD-10-CM

## 2019-05-30 DIAGNOSIS — R93.89 ABNORMAL X-RAY: ICD-10-CM

## 2019-05-30 DIAGNOSIS — M43.10 ANTEROLISTHESIS: ICD-10-CM

## 2019-05-30 DIAGNOSIS — E78.2 MIXED HYPERLIPIDEMIA: ICD-10-CM

## 2019-05-30 DIAGNOSIS — M54.16 LUMBAR BACK PAIN WITH RADICULOPATHY AFFECTING LOWER EXTREMITY: Primary | ICD-10-CM

## 2019-05-30 DIAGNOSIS — Z12.5 SCREENING FOR PROSTATE CANCER: ICD-10-CM

## 2019-05-30 DIAGNOSIS — M51.36 LUMBAR DEGENERATIVE DISC DISEASE: ICD-10-CM

## 2019-05-30 LAB
ALBUMIN SERPL-MCNC: 4.4 G/DL (ref 3.5–5.2)
ALBUMIN/GLOBULIN RATIO: 1.7 (ref 1–2.5)
ALP BLD-CCNC: 76 U/L (ref 40–129)
ALT SERPL-CCNC: 34 U/L (ref 5–41)
ANION GAP SERPL CALCULATED.3IONS-SCNC: 17 MMOL/L (ref 9–17)
AST SERPL-CCNC: 30 U/L
BILIRUB SERPL-MCNC: 0.4 MG/DL (ref 0.3–1.2)
BUN BLDV-MCNC: 16 MG/DL (ref 8–23)
BUN/CREAT BLD: ABNORMAL (ref 9–20)
CALCIUM SERPL-MCNC: 9.1 MG/DL (ref 8.6–10.4)
CHLORIDE BLD-SCNC: 103 MMOL/L (ref 98–107)
CHOLESTEROL/HDL RATIO: 3.9
CHOLESTEROL: 185 MG/DL
CO2: 21 MMOL/L (ref 20–31)
CREAT SERPL-MCNC: 0.69 MG/DL (ref 0.7–1.2)
GFR AFRICAN AMERICAN: >60 ML/MIN
GFR NON-AFRICAN AMERICAN: >60 ML/MIN
GFR SERPL CREATININE-BSD FRML MDRD: ABNORMAL ML/MIN/{1.73_M2}
GFR SERPL CREATININE-BSD FRML MDRD: ABNORMAL ML/MIN/{1.73_M2}
GLUCOSE BLD-MCNC: 87 MG/DL (ref 70–99)
HCT VFR BLD CALC: 46.1 % (ref 40.7–50.3)
HDLC SERPL-MCNC: 48 MG/DL
HEMOGLOBIN: 14.6 G/DL (ref 13–17)
LDL CHOLESTEROL: 115 MG/DL (ref 0–130)
MCH RBC QN AUTO: 30.4 PG (ref 25.2–33.5)
MCHC RBC AUTO-ENTMCNC: 31.7 G/DL (ref 28.4–34.8)
MCV RBC AUTO: 96 FL (ref 82.6–102.9)
NRBC AUTOMATED: 0 PER 100 WBC
PDW BLD-RTO: 12.5 % (ref 11.8–14.4)
PLATELET # BLD: 327 K/UL (ref 138–453)
PMV BLD AUTO: 9.3 FL (ref 8.1–13.5)
POTASSIUM SERPL-SCNC: 4.3 MMOL/L (ref 3.7–5.3)
PROSTATE SPECIFIC ANTIGEN: 1.04 UG/L
RBC # BLD: 4.8 M/UL (ref 4.21–5.77)
SODIUM BLD-SCNC: 141 MMOL/L (ref 135–144)
TOTAL PROTEIN: 7 G/DL (ref 6.4–8.3)
TRIGL SERPL-MCNC: 108 MG/DL
VLDLC SERPL CALC-MCNC: NORMAL MG/DL (ref 1–30)
WBC # BLD: 5.5 K/UL (ref 3.5–11.3)

## 2019-06-03 ENCOUNTER — TELEPHONE (OUTPATIENT)
Dept: FAMILY MEDICINE CLINIC | Age: 69
End: 2019-06-03

## 2019-06-03 DIAGNOSIS — M53.9 MULTILEVEL DEGENERATIVE DISC DISEASE: Primary | ICD-10-CM

## 2019-06-07 ENCOUNTER — OFFICE VISIT (OUTPATIENT)
Dept: FAMILY MEDICINE CLINIC | Age: 69
End: 2019-06-07
Payer: MEDICARE

## 2019-06-07 VITALS
HEART RATE: 76 BPM | WEIGHT: 215 LBS | RESPIRATION RATE: 16 BRPM | TEMPERATURE: 98 F | HEIGHT: 73 IN | SYSTOLIC BLOOD PRESSURE: 128 MMHG | BODY MASS INDEX: 28.49 KG/M2 | DIASTOLIC BLOOD PRESSURE: 74 MMHG

## 2019-06-07 DIAGNOSIS — M53.9 MULTILEVEL DEGENERATIVE DISC DISEASE: ICD-10-CM

## 2019-06-07 DIAGNOSIS — M51.36 LUMBAR DEGENERATIVE DISC DISEASE: ICD-10-CM

## 2019-06-07 DIAGNOSIS — M54.16 LUMBAR BACK PAIN WITH RADICULOPATHY AFFECTING LOWER EXTREMITY: ICD-10-CM

## 2019-06-07 DIAGNOSIS — Z00.00 ROUTINE GENERAL MEDICAL EXAMINATION AT A HEALTH CARE FACILITY: Primary | ICD-10-CM

## 2019-06-07 PROCEDURE — 99213 OFFICE O/P EST LOW 20 MIN: CPT | Performed by: NURSE PRACTITIONER

## 2019-06-07 PROCEDURE — G0439 PPPS, SUBSEQ VISIT: HCPCS | Performed by: NURSE PRACTITIONER

## 2019-06-07 RX ORDER — HYDROCODONE BITARTRATE AND ACETAMINOPHEN 5; 325 MG/1; MG/1
1 TABLET ORAL EVERY 6 HOURS PRN
Qty: 40 TABLET | Refills: 0 | Status: SHIPPED | OUTPATIENT
Start: 2019-06-07 | End: 2019-06-24 | Stop reason: SDUPTHER

## 2019-06-07 RX ORDER — METHYLPREDNISOLONE 4 MG/1
TABLET ORAL
COMMUNITY
Start: 2019-06-01 | End: 2019-10-07 | Stop reason: ALTCHOICE

## 2019-06-07 RX ORDER — HYDROCODONE BITARTRATE AND ACETAMINOPHEN 5; 325 MG/1; MG/1
TABLET ORAL
COMMUNITY
Start: 2019-06-01 | End: 2019-06-07 | Stop reason: SDUPTHER

## 2019-06-07 ASSESSMENT — ANXIETY QUESTIONNAIRES: GAD7 TOTAL SCORE: 1

## 2019-06-07 ASSESSMENT — LIFESTYLE VARIABLES: HOW OFTEN DO YOU HAVE A DRINK CONTAINING ALCOHOL: 0

## 2019-06-07 ASSESSMENT — PATIENT HEALTH QUESTIONNAIRE - PHQ9
SUM OF ALL RESPONSES TO PHQ QUESTIONS 1-9: 0
SUM OF ALL RESPONSES TO PHQ QUESTIONS 1-9: 0

## 2019-06-07 NOTE — PATIENT INSTRUCTIONS
Personalized Preventive Plan for Maribel Bui - 6/7/2019  Medicare offers a range of preventive health benefits. Some of the tests and screenings are paid in full while other may be subject to a deductible, co-insurance, and/or copay. Some of these benefits include a comprehensive review of your medical history including lifestyle, illnesses that may run in your family, and various assessments and screenings as appropriate. After reviewing your medical record and screening and assessments performed today your provider may have ordered immunizations, labs, imaging, and/or referrals for you. A list of these orders (if applicable) as well as your Preventive Care list are included within your After Visit Summary for your review. Other Preventive Recommendations:    · A preventive eye exam performed by an eye specialist is recommended every 1-2 years to screen for glaucoma; cataracts, macular degeneration, and other eye disorders. · A preventive dental visit is recommended every 6 months. · Try to get at least 150 minutes of exercise per week or 10,000 steps per day on a pedometer . · Order or download the FREE \"Exercise & Physical Activity: Your Everyday Guide\" from [employer]. Call 1-686.958.7156 or search [employer] online. · You need 2592-5017 mg of calcium and 7435-9164 IU of vitamin D per day. It is possible to meet your calcium requirement with diet alone, but a vitamin D supplement is usually necessary to meet this goal.  · When exposed to the sun, use a sunscreen that protects against both UVA and UVB radiation with an SPF of 30 or greater. Reapply every 2 to 3 hours or after sweating, drying off with a towel, or swimming. · Always wear a seat belt when traveling in a car. Always wear a helmet when riding a bicycle or motorcycle.

## 2019-06-07 NOTE — PROGRESS NOTES
Medicare Annual Wellness Visit  Name: Camila Florian Date: 2019   MRN: [id number] Sex: Male   Age: 76 y.o. Ethnicity: Non-/Non    : 1950 Race: White      Joyce Cadena is here for Medicare AWV    Screenings for behavioral, psychosocial and functional/safety risks, and cognitive dysfunction are all negative except as indicated below. These results, as well as other patient data from the [address] form, are documented in Flowsheets linked to this Encounter. Allergies   Allergen Reactions    Penicillins Other (See Comments)     Other reaction(s): Rash  Pt does not recall the reaction        Prior to Visit Medications    Medication Sig Taking? Authorizing Provider   methylPREDNISolone (MEDROL DOSEPACK) 4 MG tablet  Yes Historical Provider, MD   HYDROcodone-acetaminophen (NORCO) 5-325 MG per tablet Take 1 tablet by mouth every 6 hours as needed for Pain for up to 14 days.  Yes DARIA Hargrove - CNP   tiZANidine (ZANAFLEX) 4 MG tablet Take 1 tablet by mouth 3 times daily Yes Celena Crawford MD   etodolac (LODINE) 400 MG tablet Take 1 tablet by mouth 2 times daily Yes Rachel Pollock MD   omeprazole (PRILOSEC) 20 MG delayed release capsule Take 1 capsule by mouth Daily Yes Rachel Pollock MD   atorvastatin (LIPITOR) 10 MG tablet Take 1 tablet by mouth daily Yes Rachel Pollock MD   tamsulosin (FLOMAX) 0.4 MG capsule Take 1 capsule by mouth daily Yes Swati Johnson MD   Saccharomyces boulardii (PROBIOTIC) 250 MG CAPS Take 1 capsule by mouth daily Yes Historical Provider, MD   Multiple Vitamins-Minerals (THERAPEUTIC MULTIVITAMIN-MINERALS) tablet Take 1 tablet by mouth daily Yes Historical Provider, MD   cetirizine (ZYRTEC) 5 MG tablet Take 10 mg by mouth daily Yes Historical Provider, MD   Turmeric POWD Take by mouth daily 1 scoop Yes Historical Provider, MD   FIBER PO Take 3 tablets by mouth daily  Yes Historical Provider, MD   Garlic 5726 MG CAPS Take 1 capsule by mouth daily. Yes Historical Provider, MD       Past Medical History:   Diagnosis Date    Allergic rhinitis     Arthritis     BPH (benign prostatic hyperplasia)     Caffeine use     3 cups coffee/day    Chronic back pain     Diarrhea     ED (erectile dysfunction)     Esophageal stricture     GERD (gastroesophageal reflux disease)     Headache(784.0)     Hemorrhoids     Hyperlipemia     Rotator cuff tear, right      Past Surgical History:   Procedure Laterality Date    APPENDECTOMY      CARPAL TUNNEL RELEASE Right 2018    COLONOSCOPY      CYSTOSCOPY  09    ESOPHAGEAL DILATATION      HAND SURGERY      HERNIA REPAIR      ROTATOR CUFF REPAIR      TOE SURGERY      TOTAL HIP ARTHROPLASTY Left 2018    hip    UPPER GASTROINTESTINAL ENDOSCOPY         Family History   Problem Relation Age of Onset    Hypertension Mother             Cancer Father                CareTeam (Including outside providers/suppliers regularly involved in providing care):   Patient Care Team:  Arlen Severe, APRN - CNP as PCP - General (Certified Nurse Practitioner)  Arlen Severe, APRN - CNP as PCP - [facility] Empaneled Provider  Letty Ortiz MD as Consulting Physician (Urology)  Stu Nolan as Consulting Physician (Ophthalmology)  Marcio Mosher MD (Gastroenterology)  Yomi Gibbons MD as Consulting Physician (Orthopedic Surgery)    Wt Readings from Last 3 Encounters:   19 215 lb (97.5 kg)   19 219 lb (99.3 kg)   18 221 lb (100.2 kg)     Vitals:    19 0824   BP: 128/74   Site: Right Upper Arm   Position: Sitting   Cuff Size: Medium Adult   Pulse: 76   Resp: 16   Temp: 98 °F (36.7 °C)   TempSrc: Tympanic   Weight: 215 lb (97.5 kg)   Height: 6' 1\" (1.854 m)     Body mass index is 28.37 kg/m². Based upon direct observation of the patient, evaluation of cognition reveals recent and remote memory intact.       Patient's complete Health Risk Assessment and screening values have been reviewed and are found in Flowsheets. The following problems were reviewed today and where indicated follow up appointments were made and/or referrals ordered. Positive Risk Factor Screenings with Interventions:     General Health:  General  In general, how would you say your health is?: Fair  In the past 7 days, have you experienced any of the following? New or Increased Pain, New or Increased Fatigue, Loneliness, Social Isolation, Stress or Anger?: (!) New or Increased Pain, New or Increased Fatigue, Stress  Do you get the social and emotional support that you need?: Yes  Do you have a Living Will?: Yes  General Health Risk Interventions:  · Pain issues: acute on chronic back pain, has appt with neurosurgery next week, seen in ER [facility] 6/1/19    Health Habits/Nutrition:  Health Habits/Nutrition  Do you exercise for at least 20 minutes 2-3 times per week?: (!) No  Have you lost any weight without trying in the past 3 months?: No  Do you eat fewer than 2 meals per day?: No  Have you seen a dentist within the past year?: Yes  Body mass index is 28.37 kg/m².   Health Habits/Nutrition Interventions:  · Inadequate physical activity:  patient is not ready to increase his/her physical activity level at this time, acute low back pain    Hearing/Vision:  Hearing/Vision  Do you or your family notice any trouble with your hearing?: (!) Yes  Do you have difficulty driving, watching TV, or doing any of your daily activities because of your eyesight?: No  Have you had an eye exam within the past year?: Yes  Hearing/Vision Interventions:  · Hearing concerns:  [facility]    Safety:  Safety  Do you have working smoke detectors?: Yes  Have all throw rugs been removed or fastened?: Yes  Do you have non-slip mats in all bathtubs?: Yes  Do all of your stairways have a railing or banister?: (!) No  Are your doorways, halls and stairs free of clutter?: Yes  Do you always fasten your seatbelt when you are in a car?: Yes  Safety Interventions:  · Patient declines any further evaluation/treatment for this issue    Personalized Preventive Plan   Current Health Maintenance Status  Immunization History   Administered Date(s) Administered    Influenza Virus Vaccine 08/27/2010, 10/24/2012, 10/09/2013, 10/10/2014, 09/21/2015    Influenza, Clifm Gens, 3 Years and older, IM (Fluzone 3 yrs and older or Afluria 5 yrs and older) 10/21/2016, 10/24/2017, 10/24/2018    Pneumococcal 13-valent Conjugate ([id number]) 04/27/2016    Pneumococcal Polysaccharide ([id number]) 10/28/2003, 05/15/2017    Tdap (Boostrix, Adacel) 11/23/2009        Health Maintenance   Topic Date Due    Shingles Vaccine (1 of 2) 09/10/2000    DTaP/Tdap/Td vaccine (2 - Td) 11/23/2019    Lipid screen  05/30/2024    Colon cancer screen colonoscopy  03/22/2027    Flu vaccine  Completed    Pneumococcal 65+ years Vaccine  Completed    AAA screen  Completed    Hepatitis C screen  Completed       Back Pain   This is a recurrent problem. The current episode started 1 to 4 weeks ago. The problem occurs constantly. The problem has been gradually worsening since onset. The pain is present in the lumbar spine. The quality of the pain is described as stabbing and burning. The pain radiates to the right thigh and left thigh. The pain is moderate. The pain is the same all the time. The symptoms are aggravated by bending, sitting, twisting and coughing. Associated symptoms include leg pain (right). Pertinent negatives include no abdominal pain, bladder incontinence, bowel incontinence, chest pain, dysuria, fever, headaches, perianal numbness or weakness. He has tried analgesics, heat, muscle relaxant, ice, NSAIDs and home exercises for the symptoms. The treatment provided moderate relief. Review of Systems   Constitutional: Positive for activity change. Negative for appetite change, fatigue and fever.    HENT: Negative for has at home currently) - if not working as well, call office and will send flexeril (what he was prescribed by ER)  Proceed with ice / heat as needed for pain  Proceed with using tennis ball to massage areas of tension  Recommend using icy hot / Biofreeze topically as needed for muscle pain  Recommend stretching exercises per AVS   Monitor for worsening symptoms  Call office with concerns    Recommendations for Preventive Services Due: see orders and patient instructions/AVS.  .   Recommended screening schedule for the next 5-10 years is provided to the patient in written form: see Patient Instructions/AVS.

## 2019-06-09 ASSESSMENT — ENCOUNTER SYMPTOMS
WHEEZING: 0
SHORTNESS OF BREATH: 0
BACK PAIN: 1
EYE PAIN: 0
ABDOMINAL DISTENTION: 0
RHINORRHEA: 0
SINUS PRESSURE: 0
BOWEL INCONTINENCE: 0
NAUSEA: 0
CHEST TIGHTNESS: 0
COUGH: 0
EYE DISCHARGE: 0
BLOOD IN STOOL: 0
DIARRHEA: 0
VOMITING: 0
ABDOMINAL PAIN: 0
EYE REDNESS: 0
SORE THROAT: 0

## 2019-06-13 RX ORDER — TIZANIDINE 4 MG/1
4 TABLET ORAL 3 TIMES DAILY
Qty: 60 TABLET | Refills: 1 | Status: SHIPPED | OUTPATIENT
Start: 2019-06-13 | End: 2019-08-01 | Stop reason: SDUPTHER

## 2019-06-24 DIAGNOSIS — M54.16 LUMBAR BACK PAIN WITH RADICULOPATHY AFFECTING LOWER EXTREMITY: ICD-10-CM

## 2019-06-24 DIAGNOSIS — M53.9 MULTILEVEL DEGENERATIVE DISC DISEASE: ICD-10-CM

## 2019-06-24 DIAGNOSIS — M12.9 ARTHROPATHY: ICD-10-CM

## 2019-06-24 DIAGNOSIS — M51.36 LUMBAR DEGENERATIVE DISC DISEASE: ICD-10-CM

## 2019-06-24 DIAGNOSIS — M54.2 NECK PAIN: ICD-10-CM

## 2019-06-24 RX ORDER — IBUPROFEN 800 MG/1
800 TABLET ORAL EVERY 8 HOURS PRN
Qty: 90 TABLET | Refills: 3 | Status: SHIPPED | OUTPATIENT
Start: 2019-06-24 | End: 2019-09-09 | Stop reason: SDUPTHER

## 2019-06-24 NOTE — TELEPHONE ENCOUNTER
LOV:7-11-23  ROV:unspecified  LRF:6-7-19  Health Maintenance   Topic Date Due    Shingles Vaccine (1 of 2) 09/10/2000    DTaP/Tdap/Td vaccine (2 - Td) 11/23/2019    Annual Wellness Visit (AWV)  06/06/2020    Lipid screen  05/30/2024    Colon cancer screen colonoscopy  03/22/2027    Flu vaccine  Completed    Pneumococcal 65+ years Vaccine  Completed    AAA screen  Completed    Hepatitis C screen  Completed             (applicable per patient's age: Cancer Screenings, Depression Screening, Fall Risk Screening, Immunizations)    LDL Cholesterol (mg/dL)   Date Value   05/30/2019 115     LDL Calculated (mg/dL)   Date Value   05/15/2014 115     AST (U/L)   Date Value   05/30/2019 30     ALT (U/L)   Date Value   05/30/2019 34     BUN (mg/dL)   Date Value   05/30/2019 16      (goal A1C is < 7)   (goal LDL is <100) need 30-50% reduction from baseline     BP Readings from Last 3 Encounters:   06/07/19 128/74   05/17/19 130/76   12/27/18 136/80    (goal /80)      All Future Testing planned in [facility]:  Lab Frequency Next Occurrence   CT ABDOMEN PELVIS W IV CONTRAST Additional Contrast? None Once 12/27/2019   US SPLEEN Once 07/02/2019       Next Visit Date:  No future appointments.          Patient Active Problem List:     GERD (gastroesophageal reflux disease)     Hyperlipidemia     External hemorrhoids     Impotence of organic origin     Nocturia     Arthropathy     Benign prostatic hyperplasia with urinary obstruction     Allergic rhinitis     Rotator cuff arthropathy

## 2019-06-24 NOTE — TELEPHONE ENCOUNTER
How is his back pain? Any better with physical therapy? How often is he using pain medication? How many tablets and how often? Is it helping? When is his appointment with neurosurgery? Need this documented to be able to send more pain medication. Also clarify pharmacy as I am not sure [facility]

## 2019-06-25 RX ORDER — HYDROCODONE BITARTRATE AND ACETAMINOPHEN 5; 325 MG/1; MG/1
1 TABLET ORAL EVERY 6 HOURS PRN
Qty: 50 TABLET | Refills: 0 | Status: SHIPPED | OUTPATIENT
Start: 2019-06-25 | End: 2019-07-25

## 2019-06-25 NOTE — TELEPHONE ENCOUNTER
Contacted patient regarding Hydrocodone prescription and patient noted that the Physical Therapy has improved the pain some. Patient was seen by the Neurosurgeon last week and mentioned surgery, but patient would like to see how Physical Therapy helps first.  Patient has been taking one tablet every 12 hours specifically 8AM and 8PM which seems to help with the pain going to Physical Therapy and then with the pain ending the day. Patient uses [facility] in [place].

## 2019-07-11 ENCOUNTER — HOSPITAL ENCOUNTER (OUTPATIENT)
Dept: ULTRASOUND IMAGING | Facility: CLINIC | Age: 69
Discharge: HOME | End: 2019-07-13
Payer: MEDICARE

## 2019-07-11 DIAGNOSIS — K42.9 UMBILICAL HERNIA WITHOUT OBSTRUCTION AND WITHOUT GANGRENE: ICD-10-CM

## 2019-07-11 PROCEDURE — 76705 ECHO EXAM OF ABDOMEN: CPT

## 2019-08-01 ENCOUNTER — OFFICE VISIT (OUTPATIENT)
Dept: FAMILY MEDICINE CLINIC | Age: 69
End: 2019-08-01
Payer: MEDICARE

## 2019-08-01 VITALS
TEMPERATURE: 98.5 F | SYSTOLIC BLOOD PRESSURE: 124 MMHG | BODY MASS INDEX: 28.23 KG/M2 | RESPIRATION RATE: 16 BRPM | DIASTOLIC BLOOD PRESSURE: 68 MMHG | HEART RATE: 64 BPM | WEIGHT: 214 LBS

## 2019-08-01 DIAGNOSIS — M25.511 CHRONIC RIGHT SHOULDER PAIN: Primary | ICD-10-CM

## 2019-08-01 DIAGNOSIS — G89.29 CHRONIC RIGHT SHOULDER PAIN: Primary | ICD-10-CM

## 2019-08-01 DIAGNOSIS — R20.0 NUMBNESS OF RIGHT HAND: ICD-10-CM

## 2019-08-01 DIAGNOSIS — S29.012A MUSCLE STRAIN OF UPPER BACK: ICD-10-CM

## 2019-08-01 DIAGNOSIS — M62.838 CERVICAL PARASPINAL MUSCLE SPASM: ICD-10-CM

## 2019-08-01 PROCEDURE — 99214 OFFICE O/P EST MOD 30 MIN: CPT | Performed by: NURSE PRACTITIONER

## 2019-08-01 RX ORDER — TIZANIDINE 4 MG/1
4 TABLET ORAL EVERY 8 HOURS PRN
Qty: 60 TABLET | Refills: 1 | Status: SHIPPED | OUTPATIENT
Start: 2019-08-01 | End: 2020-01-30 | Stop reason: SDUPTHER

## 2019-08-01 ASSESSMENT — ENCOUNTER SYMPTOMS
RHINORRHEA: 0
SINUS PRESSURE: 0
EYE REDNESS: 0
EYE PAIN: 0
CHEST TIGHTNESS: 0
NAUSEA: 0
DIARRHEA: 0
WHEEZING: 0
COUGH: 0
BACK PAIN: 1
BLOOD IN STOOL: 0
EYE DISCHARGE: 0
ABDOMINAL PAIN: 0
SHORTNESS OF BREATH: 0
SORE THROAT: 0
VOMITING: 0
ABDOMINAL DISTENTION: 0

## 2019-08-22 ENCOUNTER — TELEPHONE (OUTPATIENT)
Dept: FAMILY MEDICINE CLINIC | Age: 69
End: 2019-08-22

## 2019-08-22 DIAGNOSIS — M43.10 ANTEROLISTHESIS: ICD-10-CM

## 2019-08-22 DIAGNOSIS — M53.9 MULTILEVEL DEGENERATIVE DISC DISEASE: Primary | ICD-10-CM

## 2019-08-22 DIAGNOSIS — M51.36 LUMBAR DEGENERATIVE DISC DISEASE: ICD-10-CM

## 2019-09-09 DIAGNOSIS — M16.12 PRIMARY OSTEOARTHRITIS OF LEFT HIP: ICD-10-CM

## 2019-09-09 DIAGNOSIS — M12.9 ARTHROPATHY: ICD-10-CM

## 2019-09-09 DIAGNOSIS — M54.2 NECK PAIN: ICD-10-CM

## 2019-09-09 DIAGNOSIS — E78.2 MIXED HYPERLIPIDEMIA: Chronic | ICD-10-CM

## 2019-09-09 RX ORDER — IBUPROFEN 800 MG/1
800 TABLET ORAL EVERY 8 HOURS PRN
Qty: 90 TABLET | Refills: 0 | Status: SHIPPED | OUTPATIENT
Start: 2019-09-09 | End: 2019-10-07 | Stop reason: SDUPTHER

## 2019-09-09 RX ORDER — ETODOLAC 400 MG/1
400 TABLET, FILM COATED ORAL 2 TIMES DAILY
Qty: 180 TABLET | Refills: 1 | Status: CANCELLED | OUTPATIENT
Start: 2019-09-09 | End: 2020-09-09

## 2019-09-09 RX ORDER — ATORVASTATIN CALCIUM 10 MG/1
10 TABLET, FILM COATED ORAL DAILY
Qty: 90 TABLET | Refills: 1 | Status: SHIPPED | OUTPATIENT
Start: 2019-09-09 | End: 2020-03-11 | Stop reason: SDUPTHER

## 2019-09-09 RX ORDER — ATORVASTATIN CALCIUM 10 MG/1
10 TABLET, FILM COATED ORAL DAILY
Qty: 30 TABLET | Refills: 0 | Status: SHIPPED | OUTPATIENT
Start: 2019-09-09 | End: 2019-10-07 | Stop reason: SDUPTHER

## 2019-09-09 RX ORDER — IBUPROFEN 800 MG/1
800 TABLET ORAL EVERY 8 HOURS PRN
Qty: 270 TABLET | Refills: 1 | Status: SHIPPED | OUTPATIENT
Start: 2019-09-09 | End: 2020-01-30 | Stop reason: ALTCHOICE

## 2019-10-03 LAB
BASOPHILS ABSOLUTE: NORMAL /ΜL
BASOPHILS RELATIVE PERCENT: NORMAL %
BILIRUBIN, URINE: NEGATIVE
BLOOD, URINE: NEGATIVE
BUN BLDV-MCNC: 17 MG/DL
CALCIUM SERPL-MCNC: 9.6 MG/DL
CHLORIDE BLD-SCNC: 101 MMOL/L
CLARITY: CLEAR
CO2: 29 MMOL/L
COLOR: YELLOW
CREAT SERPL-MCNC: 0.9 MG/DL
EOSINOPHILS ABSOLUTE: NORMAL /ΜL
EOSINOPHILS RELATIVE PERCENT: NORMAL %
GFR CALCULATED: >60
GLUCOSE BLD-MCNC: 109 MG/DL
GLUCOSE URINE: NORMAL
HCT VFR BLD CALC: 44.8 % (ref 41–53)
HEMOGLOBIN: 15.4 G/DL (ref 13.5–17.5)
KETONES, URINE: NEGATIVE
LEUKOCYTE ESTERASE, URINE: NEGATIVE
LYMPHOCYTES ABSOLUTE: NORMAL /ΜL
LYMPHOCYTES RELATIVE PERCENT: NORMAL %
MCH RBC QN AUTO: 31.9 PG
MCHC RBC AUTO-ENTMCNC: 34.4 G/DL
MCV RBC AUTO: 93 FL
MONOCYTES ABSOLUTE: NORMAL /ΜL
MONOCYTES RELATIVE PERCENT: NORMAL %
NEUTROPHILS ABSOLUTE: NORMAL /ΜL
NEUTROPHILS RELATIVE PERCENT: NORMAL %
NITRITE, URINE: NEGATIVE
PH UA: 7 (ref 4.5–8)
PLATELET # BLD: 344 K/ΜL
PMV BLD AUTO: 7.1 FL
POTASSIUM SERPL-SCNC: 4.9 MMOL/L
PROTEIN UA: NEGATIVE
RBC # BLD: 4.84 10^6/ΜL
SODIUM BLD-SCNC: 139 MMOL/L
SPECIFIC GRAVITY, URINE: 1.01
UROBILINOGEN, URINE: NORMAL
WBC # BLD: 6 10^3/ML

## 2019-10-05 ENCOUNTER — NURSE ONLY (OUTPATIENT)
Dept: FAMILY MEDICINE CLINIC | Age: 69
End: 2019-10-05
Payer: MEDICARE

## 2019-10-05 DIAGNOSIS — Z23 NEED FOR INFLUENZA VACCINATION: ICD-10-CM

## 2019-10-05 PROCEDURE — G0008 ADMIN INFLUENZA VIRUS VAC: HCPCS | Performed by: NURSE PRACTITIONER

## 2019-10-05 PROCEDURE — 90686 IIV4 VACC NO PRSV 0.5 ML IM: CPT | Performed by: NURSE PRACTITIONER

## 2019-10-07 ENCOUNTER — OFFICE VISIT (OUTPATIENT)
Dept: FAMILY MEDICINE CLINIC | Age: 69
End: 2019-10-07
Payer: MEDICARE

## 2019-10-07 VITALS
TEMPERATURE: 98.1 F | SYSTOLIC BLOOD PRESSURE: 120 MMHG | DIASTOLIC BLOOD PRESSURE: 76 MMHG | WEIGHT: 216 LBS | RESPIRATION RATE: 18 BRPM | HEART RATE: 70 BPM | BODY MASS INDEX: 29.29 KG/M2

## 2019-10-07 DIAGNOSIS — M48.062 SPINAL STENOSIS OF LUMBAR REGION WITH NEUROGENIC CLAUDICATION: Primary | ICD-10-CM

## 2019-10-07 DIAGNOSIS — M51.26 HERNIATED LUMBAR INTERVERTEBRAL DISC: ICD-10-CM

## 2019-10-07 DIAGNOSIS — M43.16 SPONDYLOLISTHESIS OF LUMBAR REGION: ICD-10-CM

## 2019-10-07 DIAGNOSIS — Z01.818 PRE-OP EXAMINATION: ICD-10-CM

## 2019-10-07 PROBLEM — M48.061 SPINAL STENOSIS OF LUMBAR REGION: Status: ACTIVE | Noted: 2019-08-21

## 2019-10-07 PROBLEM — M75.100 ROTATOR CUFF TEAR ARTHROPATHY: Status: ACTIVE | Noted: 2017-02-02

## 2019-10-07 PROBLEM — M12.819 ROTATOR CUFF TEAR ARTHROPATHY: Status: ACTIVE | Noted: 2017-02-02

## 2019-10-07 PROCEDURE — 99214 OFFICE O/P EST MOD 30 MIN: CPT | Performed by: NURSE PRACTITIONER

## 2019-10-07 ASSESSMENT — ENCOUNTER SYMPTOMS
EYE PAIN: 0
DIARRHEA: 0
SINUS PRESSURE: 0
EYE DISCHARGE: 0
EYE REDNESS: 0
BACK PAIN: 1
ABDOMINAL PAIN: 0
WHEEZING: 0
ABDOMINAL DISTENTION: 0
BLOOD IN STOOL: 0
VOMITING: 0
SORE THROAT: 0
SHORTNESS OF BREATH: 0
CHEST TIGHTNESS: 0
RHINORRHEA: 0
COUGH: 0
NAUSEA: 0

## 2019-11-20 ENCOUNTER — HOSPITAL ENCOUNTER (OUTPATIENT)
Dept: GENERAL RADIOLOGY | Age: 69
Discharge: HOME | End: 2019-11-22
Payer: MEDICARE

## 2019-11-20 ENCOUNTER — HOSPITAL ENCOUNTER (OUTPATIENT)
Age: 69
Discharge: HOME | End: 2019-11-22
Payer: MEDICARE

## 2019-11-20 ENCOUNTER — OFFICE VISIT (OUTPATIENT)
Dept: FAMILY MEDICINE CLINIC | Age: 69
End: 2019-11-20
Payer: MEDICARE

## 2019-11-20 VITALS
SYSTOLIC BLOOD PRESSURE: 144 MMHG | DIASTOLIC BLOOD PRESSURE: 69 MMHG | HEIGHT: 72 IN | TEMPERATURE: 98.5 F | WEIGHT: 216 LBS | BODY MASS INDEX: 29.26 KG/M2 | HEART RATE: 92 BPM | OXYGEN SATURATION: 97 %

## 2019-11-20 DIAGNOSIS — M25.561 ACUTE PAIN OF RIGHT KNEE: ICD-10-CM

## 2019-11-20 DIAGNOSIS — M25.561 ACUTE PAIN OF RIGHT KNEE: Primary | ICD-10-CM

## 2019-11-20 PROCEDURE — 73560 X-RAY EXAM OF KNEE 1 OR 2: CPT

## 2019-11-20 PROCEDURE — 99213 OFFICE O/P EST LOW 20 MIN: CPT | Performed by: NURSE PRACTITIONER

## 2019-11-20 ASSESSMENT — ENCOUNTER SYMPTOMS
SORE THROAT: 0
ALLERGIC/IMMUNOLOGIC NEGATIVE: 1
CHEST TIGHTNESS: 0
EYES NEGATIVE: 1
DIARRHEA: 0
SHORTNESS OF BREATH: 0
VOMITING: 0
COUGH: 0
ABDOMINAL PAIN: 0
EYE DISCHARGE: 0
NAUSEA: 0
EYE ITCHING: 0

## 2019-12-02 RX ORDER — OMEPRAZOLE 20 MG/1
20 CAPSULE, DELAYED RELEASE ORAL DAILY
Qty: 90 CAPSULE | Refills: 1 | Status: SHIPPED | OUTPATIENT
Start: 2019-12-02 | End: 2020-06-02 | Stop reason: SDUPTHER

## 2020-01-30 ENCOUNTER — OFFICE VISIT (OUTPATIENT)
Dept: FAMILY MEDICINE CLINIC | Age: 70
End: 2020-01-30
Payer: MEDICARE

## 2020-01-30 VITALS
HEART RATE: 76 BPM | WEIGHT: 218 LBS | BODY MASS INDEX: 29.57 KG/M2 | TEMPERATURE: 98.5 F | SYSTOLIC BLOOD PRESSURE: 124 MMHG | RESPIRATION RATE: 14 BRPM | DIASTOLIC BLOOD PRESSURE: 72 MMHG

## 2020-01-30 PROCEDURE — 99214 OFFICE O/P EST MOD 30 MIN: CPT | Performed by: NURSE PRACTITIONER

## 2020-01-30 RX ORDER — TIZANIDINE 4 MG/1
4 TABLET ORAL EVERY 8 HOURS PRN
Qty: 60 TABLET | Refills: 0 | Status: SHIPPED | OUTPATIENT
Start: 2020-01-30 | End: 2020-08-19 | Stop reason: SDUPTHER

## 2020-01-30 ASSESSMENT — ENCOUNTER SYMPTOMS
ABDOMINAL PAIN: 0
SHORTNESS OF BREATH: 0
NAUSEA: 0
DIARRHEA: 0
BACK PAIN: 1
CONSTIPATION: 0

## 2020-01-30 ASSESSMENT — PATIENT HEALTH QUESTIONNAIRE - PHQ9
SUM OF ALL RESPONSES TO PHQ9 QUESTIONS 1 & 2: 0
SUM OF ALL RESPONSES TO PHQ QUESTIONS 1-9: 0
SUM OF ALL RESPONSES TO PHQ QUESTIONS 1-9: 0
1. LITTLE INTEREST OR PLEASURE IN DOING THINGS: 0
2. FEELING DOWN, DEPRESSED OR HOPELESS: 0

## 2020-01-30 NOTE — PROGRESS NOTES
2020     [address] (:  1950) is a 71 y.o. male, here for evaluation of the following medical concerns:    VIOLETTE Ambrosio presents to the office for follow up to GERD, HL. He sees Dr. Rain Lopez annually for his BPH. He saw Dr. Sheila Dela Cruz for right knee pain on 20 for cortisone injections to his right knee. He had lumbar fusion on 10/17 (Dr. Michelle Tsai). His neurosurgeon will not allow him to take ibuprofen due to impaired bone growth. He wants to start taking ibuprofen, advised patient to follow up with Dr. Michelle Tsai. His GERD is controlled with omeprazole. He eats a general diet. He drinks 2-3 beers a day. He is on cialis and flomax for bph. He urinates 2-3 times a night. His urine stream is diminished. He has been experiencing right foot pain. He has a history of plantar fascitis. He is taking Tylenol 1000 mg daily 2-3 times a week for pain. He is icing his foot with water bottle and wearing compression stockings. It is help relieving his foot pain. He is going to PT for his back, 3 times a week. He has history of left hip replacement. Review of Systems   Constitutional: Positive for activity change (due to right knee pain and low back pain). HENT: Negative for congestion. Respiratory: Negative for shortness of breath. Cardiovascular: Negative for chest pain, palpitations and leg swelling. Gastrointestinal: Negative for abdominal pain, constipation, diarrhea and nausea. Genitourinary: Positive for difficulty urinating (decrease stream). Musculoskeletal: Positive for arthralgias (right knee improved with cortisone injection), back pain, myalgias and neck stiffness. Negative for joint swelling. Right foot, muscles feel tight. Neurological: Negative for dizziness and light-headedness. Hematological: Does not bruise/bleed easily. Psychiatric/Behavioral: Negative for dysphoric mood. The patient is not nervous/anxious.         Prior to Visit Medications    Medication Sig Taking? Authorizing Provider   Misc Natural Products Malachy Lusty) CAPS Take by mouth Yes Historical Provider, MD   tiZANidine (ZANAFLEX) 4 MG tablet Take 1 tablet by mouth every 8 hours as needed (muscle spasms) Yes Timmy Ache, APRN - CNP   omeprazole (PRILOSEC) 20 MG delayed release capsule Take 1 capsule by mouth Daily Yes DARIA Garcia - CNP   atorvastatin (LIPITOR) 10 MG tablet Take 1 tablet by mouth daily Yes DARIA Garcia - CNP   tamsulosin (FLOMAX) 0.4 MG capsule TAKE 1 CAPSULE DAILY Yes Roldan Ely MD   Omega-3 Fatty Acids (OMEGA 3 PO) Take by mouth Yes Historical Provider, MD   fluticasone (VERAMYST) 27.5 MCG/SPRAY nasal spray 2 sprays by Each Nostril route daily Yes Historical Provider, MD   tadalafil (CIALIS) 5 MG tablet Take 1 tablet by mouth daily Yes Roldan Ely MD   Saccharomyces boulardii (PROBIOTIC) 250 MG CAPS Take 1 capsule by mouth daily Yes Historical Provider, MD   cetirizine (ZYRTEC) 5 MG tablet Take 10 mg by mouth daily Yes Historical Provider, MD   Turmeric POWD Take by mouth daily 1 scoop Yes Historical Provider, MD   FIBER PO Take 3 tablets by mouth daily  Yes Historical Provider, MD   Garlic 7710 MG CAPS Take 1 capsule by mouth 3 times daily (with meals)  Yes Historical Provider, MD        Social History     Tobacco Use    Smoking status: Former Smoker     Packs/day: 0.30     Years: 1.00     Pack years: 0.30     Types: Cigarettes     Start date: 1970     Last attempt to quit: 1971     Years since quittin.1    Smokeless tobacco: Never Used   Substance Use Topics    Alcohol use:  Yes     Alcohol/week: 14.0 standard drinks     Types: 14 Cans of beer per week     Comment: 2 drinks per day        Vitals:    20 1044   BP: 124/72   Site: Left Upper Arm   Position: Sitting   Cuff Size: Medium Adult   Pulse: 76   Resp: 14   Temp: 98.5 °F (36.9 °C)   TempSrc: Tympanic   Weight: 218 lb (98.9 kg) Estimated body mass index is 29.57 kg/m² as calculated from the following:    Height as of 11/20/19: 6' (1.829 m). Weight as of this encounter: 218 lb (98.9 kg). Physical Exam  Vitals signs and nursing note reviewed. Constitutional:       Appearance: He is normal weight. HENT:      Head: Normocephalic. Neck:      Musculoskeletal: Normal range of motion and neck supple. No neck rigidity. Thyroid: No thyroid mass, thyromegaly or thyroid tenderness. Vascular: No carotid bruit or JVD. Trachea: Trachea normal.   Cardiovascular:      Rate and Rhythm: Normal rate and regular rhythm. Pulses:           Carotid pulses are 2+ on the right side and 2+ on the left side. Radial pulses are 2+ on the right side and 2+ on the left side. Posterior tibial pulses are 2+ on the right side. Heart sounds: Normal heart sounds, S1 normal and S2 normal.   Pulmonary:      Effort: Pulmonary effort is normal. No respiratory distress. Breath sounds: Normal breath sounds. No stridor. No wheezing, rhonchi or rales. Musculoskeletal:      Right knee: He exhibits normal range of motion and no swelling. Tenderness found. Right ankle: No tenderness. Right lower leg: No edema. Left lower leg: No edema. Skin:     Capillary Refill: Capillary refill takes less than 2 seconds. Neurological:      Mental Status: He is alert. Psychiatric:         Behavior: Behavior is cooperative. ASSESSMENT/PLAN:  1. Gastroesophageal reflux disease, esophagitis presence not specified    - CBC; Future  - Comprehensive Metabolic Panel; Future    2. Mixed hyperlipidemia    - Lipid, Fasting; Future  - CBC; Future  - Comprehensive Metabolic Panel; Future    3. Herniated lumbar intervertebral disc      4. Acute pain of right knee      5. Plantar fasciitis      6. Screening for prostate cancer    - PSA Screening; Fut[facility]    Routine fasting blood work.   Follow-up with neurosurgery and Ortho,

## 2020-01-30 NOTE — PROGRESS NOTES
Subjective:      Patient ID: Emmanuelle Alva is a 71 y.o. male. Visit Information    Have you changed or started any medications since your last visit including any over-the-counter medicines, vitamins, or herbal medicines? no   Are you having any side effects from any of your medications? -  no  Have you stopped taking any of your medications? Is so, why? -  no    Have you seen any other physician or provider since your last visit? No  Have you had any other diagnostic tests since your last visit? No  Have you been seen in the emergency room and/or had an admission to a hospital since we last saw you? No  Have you had your routine dental cleaning in the past 6 months? no    Have you activated your [employer] account? If not, what are your barriers? Yes     Patient Care Team:  DARIA Garcia CNP as PCP - General (Certified Nurse Practitioner)  DARIA Craven CNP as PCP - [facility]  Roldan Ely MD as Consulting Physician (Urology)  Miranda Hunt as Consulting Physician (Ophthalmology)  Shazia Whiting MD (Gastroenterology)  Braydon Morales MD as Consulting Physician (Orthopedic Surgery)    Medical History Review  Past Medical, Family, and Social History reviewed and does contribute to the patient presenting condition    Health Maintenance   Topic Date Due    Diabetes screen  04/26/2020    Lipid screen  05/30/2020    Annual Wellness Visit (AWV)  06/06/2020    Colon cancer screen colonoscopy  03/22/2027    DTaP/Tdap/Td vaccine (3 - Td) 11/21/2029    Flu vaccine  Completed    Shingles Vaccine  Completed    Pneumococcal 65+ years Vaccine  Completed    AAA screen  Completed    Hepatitis C screen  Completed     There were no vitals taken for this visit.      PHQ Scores 6/7/2019 5/17/2019 6/15/2018 5/15/2017 4/27/2016 9/11/2015   PHQ2 Score 0 0 0 0 0 0   PHQ9 Score 0 0 0 0 0 0     Interpretation of Total Score DepressionSeverity: 1-4 = Minimal depression, 5-9 = Mild

## 2020-03-11 RX ORDER — ATORVASTATIN CALCIUM 10 MG/1
10 TABLET, FILM COATED ORAL DAILY
Qty: 90 TABLET | Refills: 1 | Status: SHIPPED | OUTPATIENT
Start: 2020-03-11 | End: 2020-09-08

## 2020-03-11 NOTE — TELEPHONE ENCOUNTER
LOV 1/30/20  LRF 9/19/19  RTO 6 months    Health Maintenance   Topic Date Due    Diabetes screen  04/26/2020    Lipid screen  05/30/2020    Annual Wellness Visit (AWV)  06/07/2020    Colon cancer screen colonoscopy  03/22/2027    DTaP/Tdap/Td vaccine (3 - Td) 11/21/2029    Flu vaccine  Completed    Shingles Vaccine  Completed    Pneumococcal 65+ years Vaccine  Completed    AAA screen  Completed    Hepatitis C screen  Completed    Hepatitis A vaccine  Aged Out    Hepatitis B vaccine  Aged Out    Hib vaccine  Aged Out    Meningococcal (ACWY) vaccine  Aged Out             (applicable per patient's age: Cancer Screenings, Depression Screening, Fall Risk Screening, Immunizations)    LDL Cholesterol (mg/dL)   Date Value   05/30/2019 115     LDL Calculated (mg/dL)   Date Value   05/15/2014 115     AST (U/L)   Date Value   05/30/2019 30     ALT (U/L)   Date Value   05/30/2019 34     BUN (mg/dL)   Date Value   10/03/2019 17      (goal A1C is < 7)   (goal LDL is <100) need 30-50% reduction from baseline     BP Readings from Last 3 Encounters:   01/30/20 124/72   11/20/19 (!) 144/69   10/07/19 120/76    (goal /80)      All Future Testing planned in [facility]:  Lab Frequency Next Occurrence   PSA Screening Once 01/30/2020   Lipid, Fasting Once 01/30/2020   CBC Once 01/30/2020   Comprehensive Metabolic Panel Once 41/11/5299       Next Visit Date:  Future Appointments   Date Time Provider Roldan Shaikh   7/30/2020  8:40 AM DARIA Walker - [facility]            Patient Active Problem List:     GERD (gastroesophageal reflux disease)     Hyperlipidemia     External hemorrhoids     Impotence of organic origin     Nocturia     Arthropathy     Benign prostatic hyperplasia with urinary obstruction     Allergic rhinitis     Rotator cuff arthropathy     Spinal stenosis of lumbar region     Spondylolisthesis of lumbar region     Rotator cuff tear arthropathy     Herniated lumbar intervertebral disc     Degeneration of intervertebral disc

## 2020-04-20 ENCOUNTER — TELEPHONE (OUTPATIENT)
Dept: FAMILY MEDICINE CLINIC | Age: 70
End: 2020-04-20

## 2020-05-22 ENCOUNTER — HOSPITAL ENCOUNTER (OUTPATIENT)
Age: 70
Setting detail: SPECIMEN
Discharge: HOME | End: 2020-05-22
Payer: MEDICARE

## 2020-05-22 LAB
ALBUMIN SERPL-MCNC: 4.2 G/DL (ref 3.5–5.2)
ALBUMIN/GLOBULIN RATIO: 1.8 (ref 1–2.5)
ALP BLD-CCNC: 89 U/L (ref 40–129)
ALT SERPL-CCNC: 17 U/L (ref 5–41)
ANION GAP SERPL CALCULATED.3IONS-SCNC: 14 MMOL/L (ref 9–17)
AST SERPL-CCNC: 29 U/L
BILIRUB SERPL-MCNC: 0.57 MG/DL (ref 0.3–1.2)
BUN BLDV-MCNC: 13 MG/DL (ref 8–23)
BUN/CREAT BLD: ABNORMAL (ref 9–20)
CALCIUM SERPL-MCNC: 8.9 MG/DL (ref 8.6–10.4)
CHLORIDE BLD-SCNC: 102 MMOL/L (ref 98–107)
CHOLESTEROL, FASTING: 178 MG/DL
CHOLESTEROL/HDL RATIO: 3.2
CO2: 24 MMOL/L (ref 20–31)
CREAT SERPL-MCNC: 0.58 MG/DL (ref 0.7–1.2)
GFR AFRICAN AMERICAN: >60 ML/MIN
GFR NON-AFRICAN AMERICAN: >60 ML/MIN
GFR SERPL CREATININE-BSD FRML MDRD: ABNORMAL ML/MIN/{1.73_M2}
GFR SERPL CREATININE-BSD FRML MDRD: ABNORMAL ML/MIN/{1.73_M2}
GLUCOSE BLD-MCNC: 99 MG/DL (ref 70–99)
HCT VFR BLD CALC: 44.6 % (ref 40.7–50.3)
HDLC SERPL-MCNC: 55 MG/DL
HEMOGLOBIN: 15 G/DL (ref 13–17)
LDL CHOLESTEROL: 103 MG/DL (ref 0–130)
MCH RBC QN AUTO: 30.8 PG (ref 25.2–33.5)
MCHC RBC AUTO-ENTMCNC: 33.6 G/DL (ref 28.4–34.8)
MCV RBC AUTO: 91.6 FL (ref 82.6–102.9)
NRBC AUTOMATED: 0 PER 100 WBC
PDW BLD-RTO: 12.4 % (ref 11.8–14.4)
PLATELET # BLD: 339 K/UL (ref 138–453)
PMV BLD AUTO: 9.1 FL (ref 8.1–13.5)
POTASSIUM SERPL-SCNC: 4.4 MMOL/L (ref 3.7–5.3)
PROSTATE SPECIFIC ANTIGEN: 0.99 UG/L
RBC # BLD: 4.87 M/UL (ref 4.21–5.77)
SODIUM BLD-SCNC: 140 MMOL/L (ref 135–144)
TOTAL PROTEIN: 6.5 G/DL (ref 6.4–8.3)
TRIGLYCERIDE, FASTING: 102 MG/DL
VLDLC SERPL CALC-MCNC: NORMAL MG/DL (ref 1–30)
WBC # BLD: 6 K/UL (ref 3.5–11.3)

## 2020-06-02 RX ORDER — OMEPRAZOLE 20 MG/1
20 CAPSULE, DELAYED RELEASE ORAL DAILY
Qty: 90 CAPSULE | Refills: 1 | Status: SHIPPED | OUTPATIENT
Start: 2020-06-02 | End: 2020-12-01

## 2020-07-01 ENCOUNTER — OFFICE VISIT (OUTPATIENT)
Dept: FAMILY MEDICINE CLINIC | Age: 70
End: 2020-07-01
Payer: MEDICARE

## 2020-07-01 VITALS
DIASTOLIC BLOOD PRESSURE: 63 MMHG | BODY MASS INDEX: 29.93 KG/M2 | SYSTOLIC BLOOD PRESSURE: 140 MMHG | HEIGHT: 72 IN | HEART RATE: 78 BPM | WEIGHT: 221 LBS | OXYGEN SATURATION: 96 %

## 2020-07-01 PROCEDURE — G0439 PPPS, SUBSEQ VISIT: HCPCS | Performed by: NURSE PRACTITIONER

## 2020-07-01 RX ORDER — GABAPENTIN 300 MG/1
300 CAPSULE ORAL NIGHTLY PRN
Qty: 30 CAPSULE | Refills: 0 | Status: SHIPPED | OUTPATIENT
Start: 2020-07-01 | End: 2020-08-19 | Stop reason: SDUPTHER

## 2020-07-01 SDOH — ECONOMIC STABILITY: INCOME INSECURITY: HOW HARD IS IT FOR YOU TO PAY FOR THE VERY BASICS LIKE FOOD, HOUSING, MEDICAL CARE, AND HEATING?: NOT HARD AT ALL

## 2020-07-01 ASSESSMENT — PATIENT HEALTH QUESTIONNAIRE - PHQ9
SUM OF ALL RESPONSES TO PHQ9 QUESTIONS 1 & 2: 0
1. LITTLE INTEREST OR PLEASURE IN DOING THINGS: 0
2. FEELING DOWN, DEPRESSED OR HOPELESS: 0
SUM OF ALL RESPONSES TO PHQ QUESTIONS 1-9: 0
SUM OF ALL RESPONSES TO PHQ QUESTIONS 1-9: 0

## 2020-07-01 NOTE — PATIENT INSTRUCTIONS
The exercises may be suggested for a condition or for rehabilitation. Start each exercise slowly. Ease off the exercises if you start to have pain. You will be told when to start these exercises and which ones will work best for you. How to do the exercises  Pendulum swing   If you have pain in your back, do not do this exercise. Hold on to a table or the back of a chair with your good arm. Then bend forward a little and let your sore arm hang straight down. This exercise does not use the arm muscles. Rather, use your legs and your hips to create movement that makes your arm swing freely. Use the movement from your hips and legs to guide the slightly swinging arm back and forth like a pendulum (or elephant trunk). Then guide it in circles that start small (about the size of a dinner plate). Make the circles a bit larger each day, as your pain allows. Do this exercise for 5 minutes, 5 to 7 times each day. As you have less pain, try bending over a little farther to do this exercise. This will increase the amount of movement at your shoulder. Posterior stretching exercise   Hold the elbow of your injured arm with your other hand. Use your hand to pull your injured arm gently up and across your body. You will feel a gentle stretch across the back of your injured shoulder. Hold for at least 15 to 30 seconds. Then slowly lower your arm. Repeat 2 to 4 times. Up-the-back stretch   Your doctor or physical therapist may want you to wait to do this stretch until you have regained most of your range of motion and strength. You can do this stretch in different ways. Hold any of these stretches for at least 15 to 30 seconds. Repeat them 2 to 4 times. Light stretch: Put your hand in your back pocket. Let it rest there to stretch your shoulder. Moderate stretch: With your other hand, hold your injured arm (palm outward) behind your back by the wrist. Pull your arm up gently to stretch your shoulder.   Advanced stretch: Put a towel over your other shoulder. Put the hand of your injured arm behind your back. Now hold the back end of the towel. With the other hand, hold the front end of the towel in front of your body. Pull gently on the front end of the towel. This will bring your hand farther up your back to stretch your shoulder. Overhead stretch   Standing about an arm's length away, grasp onto a solid surface. You could use a countertop, a doorknob, or the back of a sturdy chair. With your knees slightly bent, bend forward with your arms straight. Lower your upper body, and let your shoulders stretch. As your shoulders are able to stretch farther, you may need to take a step or two backward. Hold for at least 15 to 30 seconds. Then stand up and relax. If you had stepped back during your stretch, step forward so you can keep your hands on the solid surface. Repeat 2 to 4 times. Shoulder flexion (lying down)   To make a wand for this exercise, use a piece of PVC pipe or a broom handle with the broom removed. Make the wand about a foot wider than your shoulders. Lie on your back, holding a wand with both hands. Your palms should face down as you hold the wand. Keeping your elbows straight, slowly raise your arms over your head. Raise them until you feel a stretch in your shoulders, upper back, and chest.  Hold for 15 to 30 seconds. Repeat 2 to 4 times. Shoulder rotation (lying down)   To make a wand for this exercise, use a piece of PVC pipe or a broom handle with the broom removed. Make the wand about a foot wider than your shoulders. Lie on your back. Hold a wand with both hands with your elbows bent and palms up. Keep your elbows close to your body, and move the wand across your body toward the sore arm. Hold for 8 to 12 seconds. Repeat 2 to 4 times. Wall climbing (to the side)   Avoid any movement that is straight to your side, and be careful not to arch your back.  Your arm should stay about 30 degrees to the front of your side. Stand with your side to a wall so that your fingers can just touch it at an angle about 30 degrees toward the front of your body. Walk the fingers of your injured arm up the wall as high as pain permits. Try not to shrug your shoulder up toward your ear as you move your arm up. Hold that position for a count of at least 15 to 20. Walk your fingers back down to the starting position. Repeat at least 2 to 4 times. Try to reach higher each time. Wall climbing (to the front)   During this stretching exercise, be careful not to arch your back. Face a wall, and stand so your fingers can just touch it. Keeping your shoulder down, walk the fingers of your injured arm up the wall as high as pain permits. (Don't shrug your shoulder up toward your ear.)  Hold your arm in that position for at least 15 to 30 seconds. Slowly walk your fingers back down to where you started. Repeat at least 2 to 4 times. Try to reach higher each time. Shoulder blade squeeze   Stand with your arms at your sides, and squeeze your shoulder blades together. Do not raise your shoulders up as you squeeze. Hold 6 seconds. Repeat 8 to 12 times. Scapular exercise: Arm reach   Lie flat on your back. This exercise is a very slight motion that starts with your arms raised (elbows straight, arms straight). From this position, reach higher toward the geovany or ceiling. Keep your elbows straight. All motion should be from your shoulder blade only. Relax your arms back to where you started. Repeat 8 to 12 times. Arm raise to the side   During this strengthening exercise, your arm should stay about 30 degrees to the front of your side. Slowly raise your injured arm to the side, with your thumb facing up. Raise your arm 60 degrees at the most (shoulder level is 90 degrees). Hold the position for 3 to 5 seconds. Then lower your arm back to your side.  If you need to, bring your \"good\" arm across your body object at about waist level. (A bedpost will work well.) Each hand should hold an end of the band. With your elbows at your sides and bent to 90 degrees, pull the band back. Your shoulder blades should move toward each other. Then move your arms back where you started. Repeat 8 to 12 times. If you have good range of motion in your shoulders, try this exercise with your arms lifted out to the sides. Keep your elbows at a 90-degree angle. Raise the elastic band up to about shoulder level. Pull the band back to move your shoulder blades toward each other. Then move your arms back where you started. Internal rotator strengthening exercise   Start by tying a piece of elastic exercise material to a doorknob. You can use surgical tubing or Thera-Band. Stand or sit with your shoulder relaxed and your elbow bent 90 degrees. Your upper arm should rest comfortably against your side. Squeeze a rolled towel between your elbow and your body for comfort. This will help keep your arm at your side. Hold one end of the elastic band in the hand of the painful arm. Slowly rotate your forearm toward your body until it touches your belly. Slowly move it back to where you started. Keep your elbow and upper arm firmly tucked against the towel roll or at your side. Repeat 8 to 12 times. External rotator strengthening exercise   Start by tying a piece of elastic exercise material to a doorknob. You can use surgical tubing or Thera-Band. (You may also hold one end of the band in each hand.)  Stand or sit with your shoulder relaxed and your elbow bent 90 degrees. Your upper arm should rest comfortably against your side. Squeeze a rolled towel between your elbow and your body for comfort. This will help keep your arm at your side. Hold one end of the elastic band with the hand of the painful arm. Start with your forearm across your belly. Slowly rotate the forearm out away from your body.  Keep your elbow and upper arm tucked against the towel roll or the side of your body until you begin to feel tightness in your shoulder. Slowly move your arm back to where you started. Repeat 8 to 12 times. Follow-up care is a key part of your treatment and safety. Be sure to make and go to all appointments, and call your doctor if you are having problems. It's also a good idea to know your test results and keep a list of the medicines you take. Where can you learn more? Go to https://[employer]. org and sign in to your [employer] account. Enter Keith Sánchez in the [employer] box to learn more about \"Rotator Cuff: Exercises. \"     If you do not have an account, please click on the \"Sign Up Now\" link. Current as of: March 2, 2020               Content Version: 12.5  © 4677-7087 [employer]. Care instructions adapted under license by [facility] ([facility]). If you have questions about a medical condition or this instruction, always ask your healthcare professional. [place] [address] any warranty or liability for your use of this information.

## 2020-08-19 ENCOUNTER — OFFICE VISIT (OUTPATIENT)
Dept: FAMILY MEDICINE CLINIC | Age: 70
End: 2020-08-19
Payer: MEDICARE

## 2020-08-19 VITALS
SYSTOLIC BLOOD PRESSURE: 130 MMHG | OXYGEN SATURATION: 96 % | WEIGHT: 221 LBS | HEIGHT: 72 IN | HEART RATE: 80 BPM | DIASTOLIC BLOOD PRESSURE: 60 MMHG | BODY MASS INDEX: 29.93 KG/M2

## 2020-08-19 PROCEDURE — 99214 OFFICE O/P EST MOD 30 MIN: CPT | Performed by: NURSE PRACTITIONER

## 2020-08-19 RX ORDER — MELOXICAM 15 MG/1
15 TABLET ORAL DAILY
Qty: 90 TABLET | Refills: 1 | Status: SHIPPED | OUTPATIENT
Start: 2020-08-19 | End: 2021-02-18

## 2020-08-19 RX ORDER — TIZANIDINE 4 MG/1
4 TABLET ORAL EVERY 8 HOURS PRN
Qty: 60 TABLET | Refills: 0 | Status: SHIPPED | OUTPATIENT
Start: 2020-08-19 | End: 2020-08-19

## 2020-08-19 RX ORDER — GABAPENTIN 300 MG/1
300 CAPSULE ORAL NIGHTLY PRN
Qty: 90 CAPSULE | Refills: 1 | Status: SHIPPED | OUTPATIENT
Start: 2020-08-19 | End: 2021-05-11 | Stop reason: SDUPTHER

## 2020-08-19 RX ORDER — IBUPROFEN 600 MG/1
800 TABLET ORAL 2 TIMES DAILY
COMMUNITY
End: 2020-08-19 | Stop reason: ALTCHOICE

## 2020-08-19 RX ORDER — TIZANIDINE 4 MG/1
4 TABLET ORAL EVERY 8 HOURS PRN
Qty: 60 TABLET | Refills: 0 | Status: SHIPPED | OUTPATIENT
Start: 2020-08-19 | End: 2021-05-11 | Stop reason: SDUPTHER

## 2020-08-19 ASSESSMENT — ENCOUNTER SYMPTOMS
NAUSEA: 0
SHORTNESS OF BREATH: 0
BACK PAIN: 1
CONSTIPATION: 0
ABDOMINAL PAIN: 0
DIARRHEA: 0
RESPIRATORY NEGATIVE: 1

## 2020-08-19 NOTE — PROGRESS NOTES
Subjective:      Patient ID: Ksenia Richards is a 71 y.o. male. Visit Information    Have you changed or started any medications since your last visit including any over-the-counter medicines, vitamins, or herbal medicines? no   Are you having any side effects from any of your medications? -  no  Have you stopped taking any of your medications? Is so, why? -  no    Have you seen any other physician or provider since your last visit? Yes - Records Obtained Urologist  Have you had any other diagnostic tests since your last visit? No  Have you been seen in the emergency room and/or had an admission to a hospital since we last saw you? No  Have you had your routine dental cleaning in the past 6 months? yes     Have you activated your [employer] account? If not, what are your barriers?  Yes     Patient Care Team:  DARIA Ashton CNP as PCP - General (Family Nurse Practitioner)  [facility]  Alberto German MD as Consulting Physician (Urology)  Hugo Pal as Consulting Physician (Ophthalmology)  Aguila Garcia MD (Gastroenterology)  Candie Angeles MD as Consulting Physician (Orthopedic Surgery)    Medical History Review  Past Medical, Family, and Social History reviewed and does not contribute to the patient presenting condition    Health Maintenance   Topic Date Due    Flu vaccine (1) 09/01/2020    Lipid screen  05/22/2021    Annual Wellness Visit (AWV)  07/02/2021    Colon cancer screen colonoscopy  03/22/2027    DTaP/Tdap/Td vaccine (3 - Td) 11/21/2029    Shingles Vaccine  Completed    Pneumococcal 65+ years Vaccine  Completed    AAA screen  Completed    Hepatitis C screen  Completed    Hepatitis A vaccine  Aged Out    Hepatitis B vaccine  Aged Out    Hib vaccine  Aged Out    Meningococcal (ACWY) vaccine  Aged Out     /60 (Site: Right Upper Arm, Position: Sitting, Cuff Size: Large Adult)   Pulse 80   Ht 6' (1.829 m)   Wt 221 lb (100.2 kg)   SpO2 96%   BMI 29.97 kg/m²      PHQ Scores 7/1/2020 1/30/2020 6/7/2019 5/17/2019 6/15/2018 5/15/2017 4/27/2016   PHQ2 Score 0 0 0 0 0 0 0   PHQ9 Score 0 0 0 0 0 0 0     Interpretation of Total Score DepressionSeverity: 1-4 = Minimal depression, 5-9 = Mild depression, 10-14 = Moderate depression, 15-19 = Moderately severe depression, 20-27 = Severe depression    Current Outpatient Medications   Medication Sig Dispense Refill    gabapentin (NEURONTIN) 300 MG capsule Take 1 capsule by mouth nightly as needed (numbness and tingling.) for up to 180 days. Intended supply: 90 days 90 capsule 1    tiZANidine (ZANAFLEX) 4 MG tablet Take 1 tablet by mouth every 8 hours as needed (muscle spasms) 60 tablet 0    meloxicam (MOBIC) 15 MG tablet Take 1 tablet by mouth daily 90 tablet 1    omeprazole (PRILOSEC) 20 MG delayed release capsule Take 1 capsule by mouth Daily 90 capsule 1    atorvastatin (LIPITOR) 10 MG tablet Take 1 tablet by mouth daily 90 tablet 1    Misc Natural Products (URINOZINC) CAPS Take by mouth      tamsulosin (FLOMAX) 0.4 MG capsule TAKE 1 CAPSULE DAILY 90 capsule 3    Omega-3 Fatty Acids (OMEGA 3 PO) Take by mouth      fluticasone (VERAMYST) 27.5 MCG/SPRAY nasal spray 2 sprays by Each Nostril route daily      Saccharomyces boulardii (PROBIOTIC) 250 MG CAPS Take 1 capsule by mouth daily      cetirizine (ZYRTEC) 5 MG tablet Take 10 mg by mouth daily      Turmeric POWD Take by mouth daily 1 scoop      FIBER PO Take 3 tablets by mouth daily       Garlic 7689 MG CAPS Take 1 capsule by mouth 3 times daily (with meals)        No current facility-administered medications for this visit. VIOLETTE Bonilla Stephanie presents to the office today for routine follow-up on chronic conditions including GERD, allergies, hyperlipidemia, arthritis. Taking medication as prescribed. Tried gabapentin. Helped him sleep great. Would like a refill. Did have a fall July 15.   [place] and hurt his right shoulder. Does have a history of surgery of the right shoulder. Does follow-up with Ortho. Lots of crepitus. Also fell on August 3. Hit his head on a tree and fell backwards flat on his back. Denies loss of consciousness. Neck has been tight since then. Out of his muscle relaxer. Massaging the area. Does have a pool. Walks around it. Does not for exercise. Does follow-up with urology routinely. Taking Flomax. GERD controlled. Does take ibuprofen 800 mg daily. Sometimes 2 times a day. Would like to switch over to meloxicam.  After looking through his medications he has never been on this medication before. Review of Systems   Constitutional: Negative for fatigue and fever. HENT: Negative for congestion. Eyes:        Glasses. Routine eye exam.   Respiratory: Negative. Negative for shortness of breath. Cardiovascular: Negative for chest pain, palpitations and leg swelling. Gastrointestinal: Negative for abdominal pain, constipation, diarrhea and nausea. Genitourinary: Positive for difficulty urinating (decrease stream). Flomax. Follows up with urology. Musculoskeletal: Positive for arthralgias (right knee improved with cortisone injection), back pain, myalgias and neck stiffness. Negative for joint swelling. Increased right shoulder pain. History of rotator cuff repair. Lots of crepitus. Follows with Ortho. Neurological: Negative for dizziness and light-headedness. Hematological: Does not bruise/bleed easily. Psychiatric/Behavioral: Negative for dysphoric mood. The patient is not nervous/anxious. Sleeping great with gabapentin. Improved back pain. Objective:   Physical Exam  Vitals signs and nursing note reviewed. Constitutional:       Appearance: He is normal weight. HENT:      Head: Normocephalic. Neck:      Musculoskeletal: Normal range of motion and neck supple. No neck rigidity.       Thyroid: No thyroid mass, thyromegaly or thyroid tenderness. Vascular: No carotid bruit or JVD. Trachea: Trachea normal.   Cardiovascular:      Rate and Rhythm: Normal rate and regular rhythm. Pulses:           Carotid pulses are 2+ on the right side and 2+ on the left side. Radial pulses are 2+ on the right side and 2+ on the left side. Posterior tibial pulses are 2+ on the right side. Heart sounds: Normal heart sounds, S1 normal and S2 normal.   Pulmonary:      Effort: Pulmonary effort is normal. No respiratory distress. Breath sounds: Normal breath sounds. No stridor. No wheezing, rhonchi or rales. Musculoskeletal:      Right shoulder: He exhibits decreased range of motion, crepitus and pain. Cervical back: He exhibits decreased range of motion and spasm. Skin:     Capillary Refill: Capillary refill takes less than 2 seconds. Neurological:      Mental Status: He is alert. Psychiatric:         Behavior: Behavior is cooperative. Assessment / Plan:     1. Herniated lumbar intervertebral disc    - gabapentin (NEURONTIN) 300 MG capsule; Take 1 capsule by mouth nightly as needed (numbness and tingling.) for up to 180 days. Intended supply: 90 days  Dispense: 90 capsule; Refill: 1    2. Rotator cuff tear arthropathy of right shoulder      3. Mixed hyperlipidemia      4. Gastroesophageal reflux disease, esophagitis presence not specified      5. Allergic rhinitis, unspecified seasonality, unspecified trigger      Follow-up with Ortho as planned. Refill on muscle relaxer. Neck massage. Neck stretches. Monitor right shoulder. Switch ibuprofen to meloxicam.  Do not take any other anti-inflammatories. Take with food. Okay to continue gabapentin at nighttime. Do not take muscle relaxer and gabapentin at the same time. Monitor worsening symptoms. Call office with concerns. Return in about 6 months (around 2/19/2021).           Electronically signed by Romana Coke, APRN - CNP on 8/19/2020 at 4:06 PM

## 2020-09-08 RX ORDER — ATORVASTATIN CALCIUM 10 MG/1
10 TABLET, FILM COATED ORAL DAILY
Qty: 90 TABLET | Refills: 3 | Status: SHIPPED | OUTPATIENT
Start: 2020-09-08 | End: 2021-05-11 | Stop reason: SDUPTHER

## 2020-09-08 NOTE — TELEPHONE ENCOUNTER
Lov: 7/1/20  Lrf: 3/11/20  Na: 2/18/21  Health Maintenance   Topic Date Due    Flu vaccine (1) 09/01/2020    Lipid screen  05/22/2021    Annual Wellness Visit (AWV)  07/02/2021    Colon cancer screen colonoscopy  03/22/2027    DTaP/Tdap/Td vaccine (3 - Td) 11/21/2029    Shingles Vaccine  Completed    Pneumococcal 65+ years Vaccine  Completed    AAA screen  Completed    Hepatitis C screen  Completed    Hepatitis A vaccine  Aged Out    Hepatitis B vaccine  Aged Out    Hib vaccine  Aged Out    Meningococcal (ACWY) vaccine  Aged Out             (applicable per patient's age: Cancer Screenings, Depression Screening, Fall Risk Screening, Immunizations)    LDL Cholesterol (mg/dL)   Date Value   05/22/2020 103     LDL Calculated (mg/dL)   Date Value   05/15/2014 115     AST (U/L)   Date Value   05/22/2020 29     ALT (U/L)   Date Value   05/22/2020 17     BUN (mg/dL)   Date Value   05/22/2020 13      (goal A1C is < 7)   (goal LDL is <100) need 30-50% reduction from baseline     BP Readings from Last 3 Encounters:   08/19/20 130/60   08/17/20 134/73   07/01/20 (!) 140/63    (goal /80)      All Future Testing planned in [facility]:  Lab Frequency Next Occurrence       Next Visit Date:  Future Appointments   Date Time Provider Roldan Shaikh   2/18/2021 11:00 AM [facility]            Patient Active Problem List:     GERD (gastroesophageal reflux disease)     Hyperlipidemia     External hemorrhoids     Impotence of organic origin     Nocturia     Arthropathy     Benign prostatic hyperplasia with urinary obstruction     Allergic rhinitis     Rotator cuff arthropathy     Spinal stenosis of lumbar region     Spondylolisthesis of lumbar region     Rotator cuff tear arthropathy     Herniated lumbar intervertebral disc     Degeneration of intervertebral disc

## 2020-10-13 ENCOUNTER — OFFICE VISIT (OUTPATIENT)
Dept: FAMILY MEDICINE CLINIC | Age: 70
End: 2020-10-13
Payer: MEDICARE

## 2020-10-13 VITALS
WEIGHT: 215.8 LBS | OXYGEN SATURATION: 94 % | SYSTOLIC BLOOD PRESSURE: 138 MMHG | HEART RATE: 74 BPM | BODY MASS INDEX: 29.23 KG/M2 | TEMPERATURE: 98.3 F | DIASTOLIC BLOOD PRESSURE: 76 MMHG | HEIGHT: 72 IN

## 2020-10-13 PROCEDURE — G0008 ADMIN INFLUENZA VIRUS VAC: HCPCS | Performed by: STUDENT IN AN ORGANIZED HEALTH CARE EDUCATION/TRAINING PROGRAM

## 2020-10-13 PROCEDURE — 99213 OFFICE O/P EST LOW 20 MIN: CPT | Performed by: STUDENT IN AN ORGANIZED HEALTH CARE EDUCATION/TRAINING PROGRAM

## 2020-10-13 PROCEDURE — 90662 IIV NO PRSV INCREASED AG IM: CPT | Performed by: STUDENT IN AN ORGANIZED HEALTH CARE EDUCATION/TRAINING PROGRAM

## 2020-10-13 RX ORDER — ASCORBIC ACID 500 MG
500 TABLET ORAL DAILY
COMMUNITY

## 2020-10-13 RX ORDER — MAGNESIUM OXIDE 400 MG/1
400 TABLET ORAL EVERY OTHER DAY
COMMUNITY

## 2020-10-13 ASSESSMENT — ENCOUNTER SYMPTOMS
SHORTNESS OF BREATH: 0
BACK PAIN: 0
WHEEZING: 0
CONSTIPATION: 0
COUGH: 0
ABDOMINAL PAIN: 0
ABDOMINAL DISTENTION: 0
SORE THROAT: 0
DIARRHEA: 0
CHEST TIGHTNESS: 0

## 2020-10-13 NOTE — PROGRESS NOTES
Visit Information    Have you changed or started any medications since your last visit including any over-the-counter medicines, vitamins, or herbal medicines? no   Have you stopped taking any of your medications? Is so, why? -  no  Are you having any side effects from any of your medications? - no    Have you seen any other physician or provider since your last visit?  no   Have you had any other diagnostic tests since your last visit?  no   Have you been seen in the emergency room and/or had an admission in a hospital since we last saw you?  no   Have you had your routine dental cleaning in the past 6 months?  yes - 07/20     Do you have an active MyChart account? If no, what is the barrier?   Yes    Patient Care Team:  DARIA Ordaz CNP as PCP - General (Family Nurse Practitioner)  DARIA Ordaz CNP as PCP - [facility]  Josefa Vogel MD as Consulting Physician (Urology)  Sandra Ragland as Consulting Physician (Ophthalmology)  Rhett Reza MD (Gastroenterology)  Meaghan Duran MD as Consulting Physician (Orthopedic Surgery)    Medical History Review  Past Medical, Family, and Social History reviewed and does contribute to the patient presenting condition    Health Maintenance   Topic Date Due    Flu vaccine (1) 09/01/2020    Lipid screen  05/22/2021    Annual Wellness Visit (AWV)  07/02/2021    Colon cancer screen colonoscopy  03/22/2027    DTaP/Tdap/Td vaccine (3 - Td) 11/21/2029    Shingles Vaccine  Completed    Pneumococcal 65+ years Vaccine  Completed    AAA screen  Completed    Hepatitis C screen  Completed    Hepatitis A vaccine  Aged Out    Hepatitis B vaccine  Aged Out    Hib vaccine  Aged Out    Meningococcal (ACWY) vaccine  Aged Out
Historical Provider, MD   Garlic 5426 MG CAPS Take 1 capsule by mouth 3 times daily (with meals)  Yes Historical Provider, MD   fluticasone (VERAMYST) 27.5 MCG/SPRAY nasal spray 2 sprays by Each Nostril route daily  Historical Provider, MD   cetirizine (ZYRTEC) 5 MG tablet Take 10 mg by mouth daily  Historical Provider, MD        Social History     Tobacco Use    Smoking status: Former Smoker     Packs/day: 0.30     Years: 1.00     Pack years: 0.30     Types: Cigarettes     Start date: 1970     Last attempt to quit: 1971     Years since quittin.8    Smokeless tobacco: Never Used   Substance Use Topics    Alcohol use: Yes     Alcohol/week: 14.0 standard drinks     Types: 14 Cans of beer per week     Comment: 2 drinks per day       Body mass index is 29.27 kg/m². Vitals:    10/13/20 1302   BP: 138/76   Site: Left Upper Arm   Position: Sitting   Cuff Size: Large Adult   Pulse: 74   Temp: 98.3 °F (36.8 °C)   TempSrc: Temporal   SpO2: 94%   Weight: 215 lb 12.8 oz (97.9 kg)   Height: 6' (1.829 m)        Ayse Curran was seen today for annual exam and shoulder pain.     Diagnoses and all orders for this visit:    Annual physical exam    Influenza vaccination administered at current visit    Carpal tunnel syndrome of right wrist    Benign prostatic hyperplasia with lower urinary tract symptoms, symptom details unspecified    Other orders  -     INFLUENZA, HIGH-DOSE, QUADV, 72 YRS +, IM, PF, 0.7ML (FLUZONE)    Physical exam unremarkable  Agreeable to getting flu shot  Has bilateral carpal tunnel syndrome right wrist was operated on 2 years ago is still having symptoms  Also has DJD specifically bilateral knee osteoarthritis gets steroid injections which helped  Has history of torn rotator cuffs bilaterally status post surgery  Has right shoulder crepitus consistent with arthritis of shoulder will follow-up with his orthopedic surgeon  BPH with lower urinary tract symptoms is currently on Flomax  Has spoken with

## 2020-12-01 RX ORDER — OMEPRAZOLE 20 MG/1
20 CAPSULE, DELAYED RELEASE ORAL DAILY
Qty: 90 CAPSULE | Refills: 1 | Status: SHIPPED | OUTPATIENT
Start: 2020-12-01 | End: 2021-04-13 | Stop reason: SDUPTHER

## 2021-01-25 ENCOUNTER — OFFICE VISIT (OUTPATIENT)
Dept: PRIMARY CARE CLINIC | Age: 71
End: 2021-01-25
Payer: MEDICARE

## 2021-01-25 VITALS
WEIGHT: 221 LBS | SYSTOLIC BLOOD PRESSURE: 152 MMHG | HEART RATE: 80 BPM | HEIGHT: 72 IN | OXYGEN SATURATION: 98 % | DIASTOLIC BLOOD PRESSURE: 78 MMHG | TEMPERATURE: 98 F | BODY MASS INDEX: 29.93 KG/M2

## 2021-01-25 DIAGNOSIS — M25.511 ACUTE PAIN OF RIGHT SHOULDER: Primary | ICD-10-CM
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
## 2021-01-25 PROCEDURE — 99213 OFFICE O/P EST LOW 20 MIN: CPT | Performed by: NURSE PRACTITIONER

## 2021-01-25 ASSESSMENT — ENCOUNTER SYMPTOMS
CHEST TIGHTNESS: 0
COUGH: 0
SHORTNESS OF BREATH: 0

## 2021-01-25 NOTE — PROGRESS NOTES
[facility] [address] [place]   [address] [facility] [id number]  Dept: [address] a 79 y.o. male Established patient, who presents to the walk-in clinic today conditions/complaints as noted below:    Chief Complaint   Patient presents with    Pain     Pain below right shoulder blade         HPI:     Fell on 1/5 and felt pain behind his right shoulder blade. Feels like this past week, pains have worsened. Can feel the pain worse if he takes a deep breath. Was walking outside in dark and has metal fireplace. Caught shin on fireplace and fell. Landed on right side. Didn't hit head. Denies LOC. Has seen Dr. Dieudonne Branham at [facility] in past for shoulder surgery. Had rotator cuff repair bilaterally. Thinks he had right shoulder done in 2012 and left in 2014. Right hand feels stiff and thinks he is getting some numbness but has history of carpal tunnel syndrome. Has not had imaging on his right shoulder recently. Has had no imaging since his surgery. Can hear shoulder pop with movement. Shoulder Pain   The pain is present in the right shoulder. This is a new problem. The current episode started 1 to 4 weeks ago. The problem occurs daily. The pain is at a severity of 3/10. Associated symptoms include a limited range of motion and numbness (right wrist- chronic from carpal tunnel syndrome). Pertinent negatives include no tingling. The symptoms are aggravated by activity. He has tried cold, heat and rest for the symptoms.        Past Medical History:   Diagnosis Date    Allergic rhinitis     Arthritis     BPH (benign prostatic hyperplasia)     Caffeine use     3 cups coffee/day    Chronic back pain     Diarrhea     ED (erectile dysfunction)     Esophageal stricture     GERD (gastroesophageal reflux disease)     Headache(784.0)     Hemorrhoids     Hyperlipemia     Rotator cuff tear, right 2012       Current Outpatient Medications   Medication Sig Dispense tingling, light-headedness and headaches. Psychiatric/Behavioral: Negative for sleep disturbance.       :     BP (!) 152/78 (Site: Left Upper Arm, Position: Sitting, Cuff Size: Large Adult)   Pulse 80   Temp 98 °F (36.7 °C) (Temporal)   Ht 6' (1.829 m)   Wt 221 lb (100.2 kg)   SpO2 98%   BMI 29.97 kg/m²     Physical Exam  Vitals signs and nursing note reviewed. Constitutional:       Appearance: Normal appearance. He is not ill-appearing or diaphoretic. Cardiovascular:      Rate and Rhythm: Normal rate and regular rhythm. Pulmonary:      Effort: Pulmonary effort is normal. No respiratory distress. Breath sounds: Normal breath sounds. No wheezing. Musculoskeletal:      Right shoulder: He exhibits tenderness and pain. He exhibits normal range of motion, no bony tenderness, no swelling, no effusion, no deformity, no laceration and normal pulse. Right upper arm: He exhibits no tenderness, no bony tenderness, no swelling, no edema and no deformity. Comments: Popping heard in right shoulder with ROM. Skin:     General: Skin is warm and dry. Neurological:      Mental Status: He is alert and oriented to person, place, and time. Psychiatric:         Mood and Affect: Mood normal.         Behavior: Behavior normal.           :          1. Acute pain of right shoulder  -     External Referral To Physical Therapy  2. Fall, initial encounter       He follows with Ortho at [facility] for chronic shoulder pain. Advised to call Dr. Betty Rivero office to schedule appt. Can complete xray at that time. Referral to PT- prefers [facility]  Continue Mobic daily as needed for pain. Will use gabapentin nightly as needed. Had stopped medication but just restarted last night and seemed to help pain. Monitor for worsening symptoms  Call office with concerns    :      Return if symptoms worsen or fail to improve. No orders of the defined types were placed in this encounter.             Patient and/or parent given educational materials - see patient instructions. Discussed use, benefit, and side effects of prescribed medications. All patient questions answered. Patient and/or parent voiced understanding.       Electronically signed by DARIA Remy 1/25/2021 at 11:29 AM

## 2021-02-05 ENCOUNTER — TELEPHONE (OUTPATIENT)
Dept: FAMILY MEDICINE CLINIC | Age: 71
End: 2021-02-05

## 2021-02-05 NOTE — TELEPHONE ENCOUNTER
Patient was seen in [place] with [facility]. He was referred to PT for R shoulder pain. He was advised to have an xray done. He chose to wait at that time. He is now requesting the R Shoulder Xray be ordered. Patient will  the order when complete.

## 2021-02-18 RX ORDER — MELOXICAM 15 MG/1
15 TABLET ORAL DAILY
Qty: 90 TABLET | Refills: 1 | Status: SHIPPED | OUTPATIENT
Start: 2021-02-18 | End: 2021-04-13

## 2021-02-18 NOTE — TELEPHONE ENCOUNTER
Last visit:10/13/20  Last Med refill: 8/19/2020  Does patient have enough medication for 72 hours: No:     Next Visit Date:  No future appointments.     Health Maintenance   Topic Date Due    COVID-19 Vaccine (1 of 2) 09/10/1966    Lipid screen  05/22/2021    Annual Wellness Visit (AWV)  07/02/2021    Colon cancer screen colonoscopy  03/22/2027    DTaP/Tdap/Td vaccine (3 - Td) 11/21/2029    Flu vaccine  Completed    Shingles Vaccine  Completed    Pneumococcal 65+ years Vaccine  Completed    AAA screen  Completed    Hepatitis C screen  Completed    Hepatitis A vaccine  Aged Out    Hepatitis B vaccine  Aged Out    Hib vaccine  Aged Out    Meningococcal (ACWY) vaccine  Aged Out       No results found for: LABA1C          ( goal A1C is < 7)   No results found for: LABMICR  LDL Cholesterol (mg/dL)   Date Value   05/22/2020 103   05/30/2019 115     LDL Calculated (mg/dL)   Date Value   05/15/2014 115       (goal LDL is <100)   AST (U/L)   Date Value   05/22/2020 29     ALT (U/L)   Date Value   05/22/2020 17     BUN (mg/dL)   Date Value   05/22/2020 13     BP Readings from Last 3 Encounters:   01/25/21 (!) 152/78   10/13/20 138/76   08/19/20 130/60          (goal 120/80)    All Future Testing planned in [facility]  Lab Frequency Next Occurrence               Patient Active Problem List:     GERD (gastroesophageal reflux disease)     Hyperlipidemia     External hemorrhoids     Impotence of organic origin     Nocturia     Arthropathy     Benign prostatic hyperplasia with urinary obstruction     Allergic rhinitis     Rotator cuff arthropathy     Spinal stenosis of lumbar region     Spondylolisthesis of lumbar region     Rotator cuff tear arthropathy     Herniated lumbar intervertebral disc     Degeneration of intervertebral disc Statement Selected

## 2021-04-13 ENCOUNTER — OFFICE VISIT (OUTPATIENT)
Dept: PRIMARY CARE CLINIC | Age: 71
End: 2021-04-13
Payer: MEDICARE

## 2021-04-13 VITALS
SYSTOLIC BLOOD PRESSURE: 138 MMHG | HEART RATE: 88 BPM | DIASTOLIC BLOOD PRESSURE: 80 MMHG | WEIGHT: 222 LBS | BODY MASS INDEX: 30.11 KG/M2 | OXYGEN SATURATION: 97 %

## 2021-04-13 DIAGNOSIS — R07.9 CHEST PAIN, UNSPECIFIED TYPE: ICD-10-CM

## 2021-04-13 DIAGNOSIS — K21.00 GASTROESOPHAGEAL REFLUX DISEASE WITH ESOPHAGITIS WITHOUT HEMORRHAGE: Primary | ICD-10-CM

## 2021-04-13 PROCEDURE — 99214 OFFICE O/P EST MOD 30 MIN: CPT | Performed by: FAMILY MEDICINE

## 2021-04-13 RX ORDER — OMEPRAZOLE 20 MG/1
40 CAPSULE, DELAYED RELEASE ORAL DAILY
Qty: 30 CAPSULE | Refills: 1 | Status: SHIPPED | OUTPATIENT
Start: 2021-04-13 | End: 2021-05-11 | Stop reason: SDUPTHER

## 2021-04-13 NOTE — PATIENT INSTRUCTIONS
Patient Education        Gastroesophageal Reflux Disease (GERD): Care Instructions  Overview     Gastroesophageal reflux disease (GERD) is the backward flow of stomach acid into the esophagus. The esophagus is the tube that leads from your throat to your stomach. A one-way valve prevents the stomach acid from backing up into this tube. But when you have GERD, this valve does not close tightly enough. This can also cause pain and swelling in your esophagus. (This is called esophagitis.)  If you have mild GERD symptoms including heartburn, you may be able to control the problem with antacids or over-the-counter medicine. You can also make lifestyle changes to help reduce your symptoms. These include changing your diet and eating habits, such as not eating late at night and losing weight. Follow-up care is a key part of your treatment and safety. Be sure to make and go to all appointments, and call your doctor if you are having problems. It's also a good idea to know your test results and keep a list of the medicines you take. How can you care for yourself at home? · Take your medicines exactly as prescribed. Call your doctor if you think you are having a problem with your medicine. · Your doctor may recommend over-the-counter medicine. For mild or occasional indigestion, antacids, such as Tums, Gaviscon, Mylanta, or Maalox, may help. Your doctor also may recommend over-the-counter acid reducers, such as famotidine (Pepcid AC), cimetidine (Tagamet HB), or omeprazole (Prilosec). Read and follow all instructions on the label. If you use these medicines often, talk with your doctor. · Change your eating habits. ? It's best to eat several small meals instead of two or three large meals. ? After you eat, wait 2 to 3 hours before you lie down. ? Chocolate, mint, and alcohol can make GERD worse. ?  Spicy foods, foods that have a lot of acid (like tomatoes and oranges), and coffee can make GERD symptoms worse in some people. If your symptoms are worse after you eat a certain food, you may want to stop eating that food to see if your symptoms get better. · Do not smoke or chew tobacco. Smoking can make GERD worse. If you need help quitting, talk to your doctor about stop-smoking programs and medicines. These can increase your chances of quitting for good. · If you have GERD symptoms at night, raise the head of your bed 6 to 8 inches by putting the frame on blocks or placing a foam wedge under the head of your mattress. (Adding extra pillows does not work.)  · Do not wear tight clothing around your middle. · Lose weight if you need to. Losing just 5 to 10 pounds can help. When should you call for help? Call your doctor now or seek immediate medical care if:    · You have new or different belly pain.     · Your stools are black and tarlike or have streaks of blood. Watch closely for changes in your health, and be sure to contact your doctor if:    · Your symptoms have not improved after 2 days.     · Food seems to catch in your throat or chest.   Where can you learn more? Go to https://iZettle.Maps InDeed. org and sign in to your [employer]tests account. Enter A824 in the LineHop box to learn more about \"Gastroesophageal Reflux Disease (GERD): Care Instructions. \"     If you do not have an account, please click on the \"Sign Up Now\" link. Current as of: April 15, 2020               Content Version: 12.8  © 5601-3350 [employer], [facility]. Care instructions adapted under license by [facility] ([facility]). If you have questions about a medical condition or this instruction, always ask your healthcare professional. [place] [address] any warranty or liability for your use of this information.

## 2021-04-13 NOTE — PROGRESS NOTES
Subjective:  Kobe Serrano presents for   Chief Complaint   Patient presents with    Heartburn     was in the ER Sunday for chest pain. HBP. Acis reflux is really bothering him. He also has a headache. It all started on Sunday.  Headache     bp was high on Sunday, had chest discort that was  Onstant. No sob, diaphoresei or palpatation with it. It was not his classic GERD. Had it for 4 hours then went ot the ER    No n/v    Ate lotss of nachos Sunday night. Sx went away       Patient states he just started meloxicam for oa. Has hx of esophageal stenosis and has been dialted x 2 by dr. Mariam Shah in the past    No blood in stools      ER said everything looked ok.    he did not use any nitro in the er    With exretion it does not increase sx. Patient Active Problem List   Diagnosis    GERD (gastroesophageal reflux disease)    Hyperlipidemia    External hemorrhoids    Impotence of organic origin    Nocturia    Arthropathy    Benign prostatic hyperplasia with urinary obstruction    Allergic rhinitis    Rotator cuff arthropathy    Spinal stenosis of lumbar region    Spondylolisthesis of lumbar region    Rotator cuff tear arthropathy    Herniated lumbar intervertebral disc    Degeneration of intervertebral disc       · . Objective:  Physical Exam   Vitals:   Vitals:    04/13/21 0921   BP: 138/80   Site: Left Upper Arm   Position: Sitting   Cuff Size: Large Adult   Pulse: 88   SpO2: 97%   Weight: 222 lb (100.7 kg)     Wt Readings from Last 3 Encounters:   04/13/21 222 lb (100.7 kg)   01/25/21 221 lb (100.2 kg)   10/13/20 215 lb 12.8 oz (97.9 kg)     Ht Readings from Last 3 Encounters:   01/25/21 6' (1.829 m)   10/13/20 6' (1.829 m)   08/19/20 6' (1.829 m)     Body mass index is 30.11 kg/m². Constitutional: He is oriented to person, place, and time. He appears well-developed and well-nourished and in no acute distress. Answers all my questions appropriately.    Head: Normocephalic and atraumatic. Eyes:conjunctiva appear normal.    Throat: no erythema, tonsillar hypertrophy or exudate. No ulcerations noted. Lips/Teeth/Gums all appear normal.  Neck: Normal range of motion. Neck supple. No tracheal deviation present. No abnormal lymphadenopathy. No JVD noted. Carotids are clear bilaterally. No thyroid masses noted. Heart: RRR without murmur. No S3, S4, or gallop noted. Chest: Clear to auscultation bilaterally. Good breath sounds noted. No rales, wheezes, or rhonchi noted. No respiratory retractions noted. Wall has symmetrical movement with respirations. Abdomen: No distension noted.  + bowel sounds in all quadrants which are normoactive. No bruits noted. No masses could be palpated. No unusual pulsatile masses noted. To deep palpation, patient denied any significant pain. No rebound, guarding or rigidity noted to my exam.        Assessment:   Encounter Diagnoses   Name Primary?  Gastroesophageal reflux disease with esophagitis without hemorrhage Yes    Chest pain, unspecified type          Plan:   Cannot rule out CAD based on what has been done so far. Will need cardiolite. If sx worsen, go to ER    Increase PPI. DC all nsaids. GERD diet given. Medications Discontinued During This Encounter   Medication Reason    meloxicam (MOBIC) 15 MG tablet     omeprazole (PRILOSEC) 20 MG delayed release capsule REORDER     THE ABOVE NOTED DISCONTINUED MEDS MAY ONLY BE FROM 'CLEANING UP' THE MED LIST AND WERE NOT ACTUALLY CANCELED;  SEE CHART FOR DETAILS!   Orders Placed This Encounter   Medications    omeprazole (PRILOSEC) 20 MG delayed release capsule     Sig: Take 2 capsules by mouth Daily     Dispense:  30 capsule     Refill:  1     Orders Placed This Encounter   Procedures    Stress test, lexiscan     Standing Status:   Future     Standing Expiration Date:   4/13/2022     Order Specific Question:   Reason for Exam?     Answer:   Chest pain     Order Specific Question: foods that have a lot of acid (like tomatoes and oranges), and coffee can make GERD symptoms worse in some people. If your symptoms are worse after you eat a certain food, you may want to stop eating that food to see if your symptoms get better. · Do not smoke or chew tobacco. Smoking can make GERD worse. If you need help quitting, talk to your doctor about stop-smoking programs and medicines. These can increase your chances of quitting for good. · If you have GERD symptoms at night, raise the head of your bed 6 to 8 inches by putting the frame on blocks or placing a foam wedge under the head of your mattress. (Adding extra pillows does not work.)  · Do not wear tight clothing around your middle. · Lose weight if you need to. Losing just 5 to 10 pounds can help. When should you call for help? Call your doctor now or seek immediate medical care if:    · You have new or different belly pain.     · Your stools are black and tarlike or have streaks of blood. Watch closely for changes in your health, and be sure to contact your doctor if:    · Your symptoms have not improved after 2 days.     · Food seems to catch in your throat or chest.   Where can you learn more? Go to [email or web address] org and sign in to your [employer] account. Enter P466 in the [facility] box to learn more about \"Gastroesophageal Reflux Disease (GERD): Care Instructions. \"     If you do not have an account, please click on the \"Sign Up Now\" link. Current as of: April 15, 2020               Content Version: 12.8  © 2006-2021 Healthwise, Incorporated. Care instructions adapted under license by [facility] ([facility]). If you have questions about a medical condition or this instruction, always ask your healthcare professional. [place] [address] any warranty or liability for your use of this information.            Follow up with provider in 4 weeks

## 2021-04-14 ENCOUNTER — TELEPHONE (OUTPATIENT)
Dept: FAMILY MEDICINE CLINIC | Age: 71
End: 2021-04-14

## 2021-04-21 ENCOUNTER — HOSPITAL ENCOUNTER (OUTPATIENT)
Dept: NUCLEAR MEDICINE | Age: 71
Discharge: HOME | End: 2021-04-23
Payer: MEDICARE

## 2021-04-21 ENCOUNTER — HOSPITAL ENCOUNTER (OUTPATIENT)
Dept: NON INVASIVE DIAGNOSTICS | Age: 71
Discharge: HOME | End: 2021-04-23
Payer: MEDICARE

## 2021-04-21 VITALS — WEIGHT: 212 LBS | BODY MASS INDEX: 28.71 KG/M2 | HEIGHT: 72 IN

## 2021-04-21 DIAGNOSIS — R07.9 CHEST PAIN, UNSPECIFIED TYPE: ICD-10-CM

## 2021-04-21 LAB
LV EF: 59 %
LVEF MODALITY: NORMAL

## 2021-04-21 PROCEDURE — 2580000003 HC RX 258: Performed by: FAMILY MEDICINE

## 2021-04-21 PROCEDURE — 78452 HT MUSCLE IMAGE SPECT MULT: CPT

## 2021-04-21 PROCEDURE — 3430000000 HC RX DIAGNOSTIC RADIOPHARMACEUTICAL: Performed by: FAMILY MEDICINE

## 2021-04-21 PROCEDURE — 6360000002 HC RX W HCPCS: Performed by: FAMILY MEDICINE

## 2021-04-21 PROCEDURE — A9500 TC99M SESTAMIBI: HCPCS | Performed by: FAMILY MEDICINE

## 2021-04-21 PROCEDURE — 93017 CV STRESS TEST TRACING ONLY: CPT

## 2021-04-21 RX ORDER — SODIUM CHLORIDE 9 MG/ML
500 INJECTION, SOLUTION INTRAVENOUS CONTINUOUS PRN
Status: ACTIVE | OUTPATIENT
Start: 2021-04-21 | End: 2021-04-21

## 2021-04-21 RX ORDER — SODIUM CHLORIDE 0.9 % (FLUSH) 0.9 %
5-40 SYRINGE (ML) INJECTION PRN
Status: ACTIVE | OUTPATIENT
Start: 2021-04-21 | End: 2021-04-21

## 2021-04-21 RX ORDER — SODIUM CHLORIDE 0.9 % (FLUSH) 0.9 %
10 SYRINGE (ML) INJECTION PRN
Status: COMPLETED | OUTPATIENT
Start: 2021-04-21 | End: 2021-04-21

## 2021-04-21 RX ORDER — ATROPINE SULFATE 0.1 MG/ML
0.5 INJECTION INTRAVENOUS EVERY 5 MIN PRN
Status: ACTIVE | OUTPATIENT
Start: 2021-04-21 | End: 2021-04-21

## 2021-04-21 RX ORDER — METOPROLOL TARTRATE 5 MG/5ML
5 INJECTION INTRAVENOUS EVERY 5 MIN PRN
Status: ACTIVE | OUTPATIENT
Start: 2021-04-21 | End: 2021-04-21

## 2021-04-21 RX ORDER — NITROGLYCERIN 0.4 MG/1
0.4 TABLET SUBLINGUAL EVERY 5 MIN PRN
Status: ACTIVE | OUTPATIENT
Start: 2021-04-21 | End: 2021-04-21

## 2021-04-21 RX ORDER — AMINOPHYLLINE DIHYDRATE 25 MG/ML
50 INJECTION, SOLUTION INTRAVENOUS PRN
Status: ACTIVE | OUTPATIENT
Start: 2021-04-21 | End: 2021-04-21

## 2021-04-21 RX ADMIN — TETRAKIS(2-METHOXYISOBUTYLISOCYANIDE)COPPER(I) TETRAFLUOROBORATE 12.65 MILLICURIE: 1 INJECTION, POWDER, LYOPHILIZED, FOR SOLUTION INTRAVENOUS at 07:35

## 2021-04-21 RX ADMIN — SODIUM CHLORIDE, PRESERVATIVE FREE 10 ML: 5 INJECTION INTRAVENOUS at 09:27

## 2021-04-21 RX ADMIN — REGADENOSON 0.4 MG: 0.08 INJECTION, SOLUTION INTRAVENOUS at 09:26

## 2021-04-21 RX ADMIN — SODIUM CHLORIDE, PRESERVATIVE FREE 10 ML: 5 INJECTION INTRAVENOUS at 09:28

## 2021-04-21 RX ADMIN — TETRAKIS(2-METHOXYISOBUTYLISOCYANIDE)COPPER(I) TETRAFLUOROBORATE 35.7 MILLICURIE: 1 INJECTION, POWDER, LYOPHILIZED, FOR SOLUTION INTRAVENOUS at 09:28

## 2021-04-21 RX ADMIN — SODIUM CHLORIDE, PRESERVATIVE FREE 10 ML: 5 INJECTION INTRAVENOUS at 07:35

## 2021-04-21 NOTE — PROGRESS NOTES
Patient to Stress Lab, Patient Educated on Procedure and Consent Signed: Patient  Responded to Education and Verbalize Understanding. PLACED ON EKG /MONITOR AND VITALS MACHINE.  IV Flushed with NORMAL SALINE. PATIENT  Denies shortness of breath but feels slight tightness across chest at this time; MASOOD MAS SEEN PATIENT AT THE BEDSIDE AND REVIEWED EKG, NSR.  /92 , HR 75  .

## 2021-04-21 NOTE — PROGRESS NOTES
LEXISCAN STRESS TEST COMPLETED; After Injection PATIENT FELT shortness of breath   PO CAFFEINE GIVEN AFTER 2 MINUTE;  B/P 133//72    HR 87;  PATIENT BACK TO BASELINE;  IV Removed and Patient GOING TO WAITING ROOM FOR FINAL STRESS SCANS TO FOLLOW

## 2021-04-21 NOTE — PROCEDURES
[address]                [id number] [address], [address]                              CARDIAC STRESS TEST    PATIENT NAME: Jeromy Lee               :        1950  MED REC NO:   [id number]                             ROOM:  ACCOUNT NO:   [de-identified]                           ADMIT DATE: 2021  PROVIDER:     Kathy Guevara    CARDIOVASCULAR DIAGNOSTIC DEPARTMENT    DATE OF STUDY:  2021    ORDERING PROVIDER:  Aubrie Cheek MD    PRIMARY CARE PROVIDER:  Elle Moss MD    INTERPRETING PHYSICIAN:  Castro Claudio MD    TEST TYPE: LEXISCAN CARDIOLYTE STRESS TEST  INDICATION: CHEST PAIN AND EKG ABNORMALITIES    RESTING HEART RATE: 72 bpm  RESTING BLOOD PRESSURE: 154/92 mmHg  PEAK HEART RATE: 107 bpm  PEAK BLOOD PRESSURE: 174/86 mmHg    MEDICATION(S) GIVEN: 0.4 mg IV LEXISCAN. PO CAFFEINE. REASON FOR TERMINATION: MEDICATION INFUSION COMPLETE  SYMPTOMS: SHORTNESS OF BREATH POST INJECTION. RESOLVED IN RECOVERY. RESTING EKG: NORMAL. STRESS HEART RESPONSE: NORMAL RESPONSE.  BLOOD PRESSURE RESPONSE: APPROPRIATE. STRESS EKGs: NORMAL. CHEST DISCOMFORT: NO PAIN DURING STRESS. NO PAIN DURING RECOVERY. ISCHEMIC EKG CHANGES: NONE.    EKG IMPRESSION: NEGATIVE ELECTROCARDIOGRAPHICALLY LEXISCAN STRESS TEST. NUCLEAR SCAN TO FOLLOW.     Castro Claudio    D: 2021 15:20:12       T: 2021 15:21:54     ANUP/HIRAM  Job#: [id number]     Doc#: Unknown    CC:    (Retain this field even if not dictated or not decipherable)

## 2021-05-11 ENCOUNTER — OFFICE VISIT (OUTPATIENT)
Dept: FAMILY MEDICINE CLINIC | Age: 71
End: 2021-05-11
Payer: MEDICARE

## 2021-05-11 VITALS
HEART RATE: 84 BPM | BODY MASS INDEX: 30.07 KG/M2 | SYSTOLIC BLOOD PRESSURE: 132 MMHG | DIASTOLIC BLOOD PRESSURE: 60 MMHG | TEMPERATURE: 97.9 F | HEIGHT: 72 IN | WEIGHT: 222 LBS | RESPIRATION RATE: 14 BRPM | OXYGEN SATURATION: 96 %

## 2021-05-11 DIAGNOSIS — Z13.220 LIPID SCREENING: Primary | ICD-10-CM

## 2021-05-11 DIAGNOSIS — K21.00 GASTROESOPHAGEAL REFLUX DISEASE WITH ESOPHAGITIS WITHOUT HEMORRHAGE: ICD-10-CM

## 2021-05-11 DIAGNOSIS — M51.26 HERNIATED LUMBAR INTERVERTEBRAL DISC: ICD-10-CM

## 2021-05-11 DIAGNOSIS — M62.838 MUSCLE SPASM: ICD-10-CM

## 2021-05-11 DIAGNOSIS — E78.2 MIXED HYPERLIPIDEMIA: Chronic | ICD-10-CM

## 2021-05-11 PROBLEM — M75.102 NONTRAUMATIC TEAR OF LEFT ROTATOR CUFF: Status: ACTIVE | Noted: 2021-04-20

## 2021-05-11 PROBLEM — M48.062 LUMBAR STENOSIS WITH NEUROGENIC CLAUDICATION: Status: ACTIVE | Noted: 2019-08-21

## 2021-05-11 PROBLEM — M75.20 BICEPS TENDINITIS: Status: ACTIVE | Noted: 2017-02-02

## 2021-05-11 PROCEDURE — 3288F FALL RISK ASSESSMENT DOCD: CPT | Performed by: STUDENT IN AN ORGANIZED HEALTH CARE EDUCATION/TRAINING PROGRAM

## 2021-05-11 PROCEDURE — 36415 COLL VENOUS BLD VENIPUNCTURE: CPT | Performed by: STUDENT IN AN ORGANIZED HEALTH CARE EDUCATION/TRAINING PROGRAM

## 2021-05-11 PROCEDURE — 99213 OFFICE O/P EST LOW 20 MIN: CPT | Performed by: STUDENT IN AN ORGANIZED HEALTH CARE EDUCATION/TRAINING PROGRAM

## 2021-05-11 RX ORDER — VITAMIN B COMPLEX
TABLET ORAL DAILY
COMMUNITY

## 2021-05-11 RX ORDER — ATORVASTATIN CALCIUM 10 MG/1
10 TABLET, FILM COATED ORAL DAILY
Qty: 90 TABLET | Refills: 3 | Status: SHIPPED | OUTPATIENT
Start: 2021-05-11 | End: 2021-06-29

## 2021-05-11 RX ORDER — OMEPRAZOLE 20 MG/1
40 CAPSULE, DELAYED RELEASE ORAL DAILY
Qty: 30 CAPSULE | Refills: 1 | Status: SHIPPED | OUTPATIENT
Start: 2021-05-11 | End: 2021-05-18 | Stop reason: SDUPTHER

## 2021-05-11 RX ORDER — FAMOTIDINE 20 MG/1
20 TABLET, FILM COATED ORAL 2 TIMES DAILY
Qty: 60 TABLET | Refills: 3 | Status: SHIPPED | OUTPATIENT
Start: 2021-05-11 | End: 2021-06-29

## 2021-05-11 RX ORDER — GABAPENTIN 300 MG/1
300 CAPSULE ORAL NIGHTLY PRN
Qty: 90 CAPSULE | Refills: 1 | Status: SHIPPED | OUTPATIENT
Start: 2021-05-11 | End: 2021-08-11 | Stop reason: SDUPTHER

## 2021-05-11 RX ORDER — TIZANIDINE 4 MG/1
4 TABLET ORAL EVERY 8 HOURS PRN
Qty: 60 TABLET | Refills: 0 | Status: SHIPPED | OUTPATIENT
Start: 2021-05-11 | End: 2021-05-11

## 2021-05-11 ASSESSMENT — ENCOUNTER SYMPTOMS
ABDOMINAL PAIN: 0
CHEST TIGHTNESS: 0
SHORTNESS OF BREATH: 0
DIARRHEA: 0
COUGH: 0
CONSTIPATION: 0
SORE THROAT: 0
BACK PAIN: 0
WHEEZING: 0
ABDOMINAL DISTENTION: 0

## 2021-05-11 ASSESSMENT — PATIENT HEALTH QUESTIONNAIRE - PHQ9: SUM OF ALL RESPONSES TO PHQ QUESTIONS 1-9: 0

## 2021-05-11 NOTE — PROGRESS NOTES
Angel Greer (:  1950) is a 79 y.o. male,Established patient, here for evaluation of the following chief complaint(s):  Gastroesophageal Reflux      ASSESSMENT/PLAN:  1. Lipid screening  -     Lipid Panel; Future  2. Gastroesophageal reflux disease with esophagitis without hemorrhage  Assessment & Plan:   Borderline controlled, continue current medications and Decrease fatty food intake, do not eat after 6 PM, do not drink soda, do not drink coffee or caffeinated beverages in general, avoid chocolate spicy foods/acidic-containing foods      Just had stress test - low risk - reviewed with patient  CBD oil recommended  Reflux is the likely cause of his symptoms. .. Omeprazole 20 mg AM - Pepcid 20 mg @ night    He has been taken off his meloxicam because of his heartburn/esophagitis  I agree with this decision  In place of this he should try CBD oil preparations needs to make sure that he is taking CBD oil and not hemp oil which may or may not have any combination of CBD oil in it      No follow-ups on file. SUBJECTIVE/OBJECTIVE:  HPI: 79 male history of DJD bilateral shoulder arthropathy muscle spasms and recalcitrant reflux with esophagitis status post dilatation of the esophagus    He recently had a stress test he came into the walk-in clinic in our facility and was sent for nuclear medicine stress test    Results were discussed with him he has low risk overall    Currently on omeprazole 20 has GI doctor increase it to 40 he is worried about going up that high  I recommend that he try omeprazole 20 mg a.m. and Pepcid 20 mg p.m. Review of Systems   Constitutional: Negative for chills, fatigue and fever. HENT: Negative for congestion, postnasal drip and sore throat. Eyes: Negative for visual disturbance. Respiratory: Negative for cough, chest tightness, shortness of breath and wheezing. Cardiovascular: Negative.     Gastrointestinal: Negative for abdominal distention, abdominal pain, constipation and diarrhea. Genitourinary: Negative for difficulty urinating, dysuria, frequency and urgency. Musculoskeletal: Negative for arthralgias, back pain and joint swelling. Skin: Negative for rash. Neurological: Negative for dizziness, weakness and light-headedness. Psychiatric/Behavioral: Negative for agitation, decreased concentration and sleep disturbance. Physical Exam  Vitals signs and nursing note reviewed. Constitutional:       Appearance: Normal appearance. HENT:      Head: Normocephalic and atraumatic. Eyes:      Extraocular Movements: Extraocular movements intact. Neck:      Musculoskeletal: Normal range of motion and neck supple. Cardiovascular:      Rate and Rhythm: Normal rate and regular rhythm. Pulses: Normal pulses. Heart sounds: Normal heart sounds. Pulmonary:      Effort: Pulmonary effort is normal.   Skin:     General: Skin is warm. Neurological:      General: No focal deficit present. Mental Status: He is alert and oriented to person, place, and time. An electronic signature was used to authenticate this note.     --Lluvia Wise MD

## 2021-05-11 NOTE — ASSESSMENT & PLAN NOTE
Borderline controlled, continue current medications and Decrease fatty food intake, do not eat after 6 PM, do not drink soda, do not drink coffee or caffeinated beverages in general, avoid chocolate spicy foods/acidic-containing foods

## 2021-05-14 ENCOUNTER — TELEPHONE (OUTPATIENT)
Dept: FAMILY MEDICINE CLINIC | Age: 71
End: 2021-05-14

## 2021-05-14 NOTE — TELEPHONE ENCOUNTER
Pt called stating Omeprazole was sent to pharmacy incorrectly. Pt should have gotten 90 days supply and pt states he only got 15 day supple & Ins will not cover it. Needs new script sent to pharmacy for 90 day supply.

## 2021-05-18 ENCOUNTER — TELEPHONE (OUTPATIENT)
Dept: FAMILY MEDICINE CLINIC | Age: 71
End: 2021-05-18

## 2021-05-18 DIAGNOSIS — E78.2 MIXED HYPERLIPIDEMIA: ICD-10-CM

## 2021-05-18 DIAGNOSIS — K21.00 GASTROESOPHAGEAL REFLUX DISEASE WITH ESOPHAGITIS WITHOUT HEMORRHAGE: ICD-10-CM

## 2021-05-18 DIAGNOSIS — Z01.89 ROUTINE LAB DRAW: ICD-10-CM

## 2021-05-18 DIAGNOSIS — Z12.5 SCREENING PSA (PROSTATE SPECIFIC ANTIGEN): Primary | ICD-10-CM

## 2021-05-18 RX ORDER — OMEPRAZOLE 20 MG/1
40 CAPSULE, DELAYED RELEASE ORAL DAILY
Qty: 180 CAPSULE | Refills: 3 | Status: SHIPPED | OUTPATIENT
Start: 2021-05-18 | End: 2021-07-06 | Stop reason: SDUPTHER

## 2021-05-18 NOTE — TELEPHONE ENCOUNTER
Patient stopped in the office today and said that he would like to get some additional blood work done since it has been a year since he has had it done. He would like to add PSA screening, CBC, Comp. Metabolic Panel.

## 2021-06-01 ENCOUNTER — HOSPITAL ENCOUNTER (OUTPATIENT)
Age: 71
Setting detail: SPECIMEN
Discharge: HOME | End: 2021-06-01
Payer: MEDICARE

## 2021-06-01 ENCOUNTER — TELEPHONE (OUTPATIENT)
Dept: FAMILY MEDICINE CLINIC | Age: 71
End: 2021-06-01

## 2021-06-01 DIAGNOSIS — Z12.5 SCREENING PSA (PROSTATE SPECIFIC ANTIGEN): ICD-10-CM

## 2021-06-01 DIAGNOSIS — E78.2 MIXED HYPERLIPIDEMIA: ICD-10-CM

## 2021-06-01 DIAGNOSIS — Z13.220 LIPID SCREENING: ICD-10-CM

## 2021-06-01 LAB
ABSOLUTE EOS #: 0.14 K/UL (ref 0–0.44)
ABSOLUTE IMMATURE GRANULOCYTE: <0.03 K/UL (ref 0–0.3)
ABSOLUTE LYMPH #: 1.96 K/UL (ref 1.1–3.7)
ABSOLUTE MONO #: 0.67 K/UL (ref 0.1–1.2)
ALBUMIN SERPL-MCNC: 4.4 G/DL (ref 3.5–5.2)
ALBUMIN/GLOBULIN RATIO: 1.8 (ref 1–2.5)
ALP BLD-CCNC: 85 U/L (ref 40–129)
ALT SERPL-CCNC: 31 U/L (ref 5–41)
ANION GAP SERPL CALCULATED.3IONS-SCNC: 16 MMOL/L (ref 9–17)
AST SERPL-CCNC: 35 U/L
BASOPHILS # BLD: 1 % (ref 0–2)
BASOPHILS ABSOLUTE: 0.06 K/UL (ref 0–0.2)
BILIRUB SERPL-MCNC: 0.61 MG/DL (ref 0.3–1.2)
BUN BLDV-MCNC: 13 MG/DL (ref 8–23)
BUN/CREAT BLD: NORMAL (ref 9–20)
CALCIUM SERPL-MCNC: 9.3 MG/DL (ref 8.6–10.4)
CHLORIDE BLD-SCNC: 103 MMOL/L (ref 98–107)
CHOLESTEROL/HDL RATIO: 3.6
CHOLESTEROL: 193 MG/DL
CO2: 22 MMOL/L (ref 20–31)
CREAT SERPL-MCNC: 0.7 MG/DL (ref 0.7–1.2)
DIFFERENTIAL TYPE: NORMAL
EOSINOPHILS RELATIVE PERCENT: 2 % (ref 1–4)
GFR AFRICAN AMERICAN: >60 ML/MIN
GFR NON-AFRICAN AMERICAN: >60 ML/MIN
GFR SERPL CREATININE-BSD FRML MDRD: NORMAL ML/MIN/{1.73_M2}
GFR SERPL CREATININE-BSD FRML MDRD: NORMAL ML/MIN/{1.73_M2}
GLUCOSE BLD-MCNC: 87 MG/DL (ref 70–99)
HCT VFR BLD CALC: 45.6 % (ref 40.7–50.3)
HDLC SERPL-MCNC: 54 MG/DL
HEMOGLOBIN: 14.9 G/DL (ref 13–17)
IMMATURE GRANULOCYTES: 0 %
LDL CHOLESTEROL: 113 MG/DL (ref 0–130)
LYMPHOCYTES # BLD: 29 % (ref 24–43)
MCH RBC QN AUTO: 30.8 PG (ref 25.2–33.5)
MCHC RBC AUTO-ENTMCNC: 32.7 G/DL (ref 28.4–34.8)
MCV RBC AUTO: 94.4 FL (ref 82.6–102.9)
MONOCYTES # BLD: 10 % (ref 3–12)
NRBC AUTOMATED: 0 PER 100 WBC
PDW BLD-RTO: 12 % (ref 11.8–14.4)
PLATELET # BLD: 357 K/UL (ref 138–453)
PLATELET ESTIMATE: NORMAL
PMV BLD AUTO: 9.4 FL (ref 8.1–13.5)
POTASSIUM SERPL-SCNC: 4.4 MMOL/L (ref 3.7–5.3)
PROSTATE SPECIFIC ANTIGEN: 0.98 UG/L
RBC # BLD: 4.83 M/UL (ref 4.21–5.77)
RBC # BLD: NORMAL 10*6/UL
SEG NEUTROPHILS: 58 % (ref 36–65)
SEGMENTED NEUTROPHILS ABSOLUTE COUNT: 3.88 K/UL (ref 1.5–8.1)
SODIUM BLD-SCNC: 141 MMOL/L (ref 135–144)
TOTAL PROTEIN: 6.9 G/DL (ref 6.4–8.3)
TRIGL SERPL-MCNC: 131 MG/DL
VLDLC SERPL CALC-MCNC: NORMAL MG/DL (ref 1–30)
WBC # BLD: 6.7 K/UL (ref 3.5–11.3)
WBC # BLD: NORMAL 10*3/UL

## 2021-06-02 NOTE — TELEPHONE ENCOUNTER
LVM explaining that the [facility] in the chart and that the refused one was written incorrectly.

## 2021-06-29 ENCOUNTER — OFFICE VISIT (OUTPATIENT)
Dept: FAMILY MEDICINE CLINIC | Age: 71
End: 2021-06-29
Payer: MEDICARE

## 2021-06-29 VITALS
TEMPERATURE: 97.6 F | BODY MASS INDEX: 28.93 KG/M2 | HEART RATE: 80 BPM | HEIGHT: 72 IN | DIASTOLIC BLOOD PRESSURE: 72 MMHG | OXYGEN SATURATION: 97 % | WEIGHT: 213.6 LBS | RESPIRATION RATE: 13 BRPM | SYSTOLIC BLOOD PRESSURE: 132 MMHG

## 2021-06-29 DIAGNOSIS — M51.26 HERNIATED LUMBAR INTERVERTEBRAL DISC: ICD-10-CM

## 2021-06-29 DIAGNOSIS — M62.838 MUSCLE SPASM: ICD-10-CM

## 2021-06-29 DIAGNOSIS — E78.2 MIXED HYPERLIPIDEMIA: Chronic | ICD-10-CM

## 2021-06-29 DIAGNOSIS — L23.7 POISON IVY: ICD-10-CM

## 2021-06-29 DIAGNOSIS — K21.00 GASTROESOPHAGEAL REFLUX DISEASE WITH ESOPHAGITIS WITHOUT HEMORRHAGE: Primary | ICD-10-CM

## 2021-06-29 PROBLEM — M43.10 RETROLISTHESIS OF VERTEBRAE: Status: ACTIVE | Noted: 2021-06-24

## 2021-06-29 PROBLEM — Z98.1 HISTORY OF LUMBAR FUSION: Status: ACTIVE | Noted: 2021-06-24

## 2021-06-29 PROBLEM — M47.816 LUMBAR SPONDYLOSIS: Status: ACTIVE | Noted: 2019-08-21

## 2021-06-29 PROCEDURE — 3288F FALL RISK ASSESSMENT DOCD: CPT | Performed by: STUDENT IN AN ORGANIZED HEALTH CARE EDUCATION/TRAINING PROGRAM

## 2021-06-29 PROCEDURE — 99214 OFFICE O/P EST MOD 30 MIN: CPT | Performed by: STUDENT IN AN ORGANIZED HEALTH CARE EDUCATION/TRAINING PROGRAM

## 2021-06-29 RX ORDER — METHYLPREDNISOLONE 4 MG/1
TABLET ORAL
Qty: 1 KIT | Refills: 2 | Status: SHIPPED | OUTPATIENT
Start: 2021-06-29 | End: 2021-07-05

## 2021-06-29 RX ORDER — PREDNISONE 20 MG/1
20 TABLET ORAL DAILY
COMMUNITY
Start: 2021-06-24 | End: 2021-06-29

## 2021-06-29 RX ORDER — TIZANIDINE 4 MG/1
4 TABLET ORAL EVERY 8 HOURS PRN
COMMUNITY
Start: 2021-06-24 | End: 2022-02-08 | Stop reason: SDUPTHER

## 2021-06-29 RX ORDER — IBUPROFEN 800 MG/1
800 TABLET ORAL 2 TIMES DAILY PRN
Qty: 60 TABLET | Refills: 0 | Status: SHIPPED | OUTPATIENT
Start: 2021-06-29 | End: 2021-07-22 | Stop reason: SDUPTHER

## 2021-06-29 RX ORDER — ATORVASTATIN CALCIUM 20 MG/1
20 TABLET, FILM COATED ORAL DAILY
Qty: 90 TABLET | Refills: 3 | Status: SHIPPED | OUTPATIENT
Start: 2021-06-29 | End: 2021-09-23

## 2021-06-29 SDOH — HEALTH STABILITY: PHYSICAL HEALTH: ON AVERAGE, HOW MANY DAYS PER WEEK DO YOU ENGAGE IN MODERATE TO STRENUOUS EXERCISE (LIKE A BRISK WALK)?: 0 DAYS

## 2021-06-29 SDOH — ECONOMIC STABILITY: TRANSPORTATION INSECURITY
IN THE PAST 12 MONTHS, HAS LACK OF TRANSPORTATION KEPT YOU FROM MEETINGS, WORK, OR FROM GETTING THINGS NEEDED FOR DAILY LIVING?: NO

## 2021-06-29 SDOH — ECONOMIC STABILITY: FOOD INSECURITY: WITHIN THE PAST 12 MONTHS, YOU WORRIED THAT YOUR FOOD WOULD RUN OUT BEFORE YOU GOT MONEY TO BUY MORE.: NEVER TRUE

## 2021-06-29 SDOH — ECONOMIC STABILITY: TRANSPORTATION INSECURITY
IN THE PAST 12 MONTHS, HAS THE LACK OF TRANSPORTATION KEPT YOU FROM MEDICAL APPOINTMENTS OR FROM GETTING MEDICATIONS?: NO

## 2021-06-29 SDOH — HEALTH STABILITY: PHYSICAL HEALTH: ON AVERAGE, HOW MANY MINUTES DO YOU ENGAGE IN EXERCISE AT THIS LEVEL?: 0 MIN

## 2021-06-29 SDOH — ECONOMIC STABILITY: FOOD INSECURITY: WITHIN THE PAST 12 MONTHS, THE FOOD YOU BOUGHT JUST DIDN'T LAST AND YOU DIDN'T HAVE MONEY TO GET MORE.: NEVER TRUE

## 2021-06-29 SDOH — ECONOMIC STABILITY: INCOME INSECURITY: IN THE LAST 12 MONTHS, WAS THERE A TIME WHEN YOU WERE NOT ABLE TO PAY THE MORTGAGE OR RENT ON TIME?: NO

## 2021-06-29 SDOH — ECONOMIC STABILITY: HOUSING INSECURITY
IN THE LAST 12 MONTHS, WAS THERE A TIME WHEN YOU DID NOT HAVE A STEADY PLACE TO SLEEP OR SLEPT IN A SHELTER (INCLUDING NOW)?: NO

## 2021-06-29 ASSESSMENT — SOCIAL DETERMINANTS OF HEALTH (SDOH)
WITHIN THE LAST YEAR, HAVE YOU BEEN HUMILIATED OR EMOTIONALLY ABUSED IN OTHER WAYS BY YOUR PARTNER OR EX-PARTNER?: NO
HOW OFTEN DO YOU ATTEND CHURCH OR RELIGIOUS SERVICES?: NEVER
WITHIN THE LAST YEAR, HAVE YOU BEEN AFRAID OF YOUR PARTNER OR EX-PARTNER?: NO
HOW OFTEN DO YOU GET TOGETHER WITH FRIENDS OR RELATIVES?: ONCE A WEEK
HOW OFTEN DO YOU ATTENT MEETINGS OF THE CLUB OR ORGANIZATION YOU BELONG TO?: NEVER
WITHIN THE LAST YEAR, HAVE YOU BEEN KICKED, HIT, SLAPPED, OR OTHERWISE PHYSICALLY HURT BY YOUR PARTNER OR EX-PARTNER?: NO
IN A TYPICAL WEEK, HOW MANY TIMES DO YOU TALK ON THE PHONE WITH FAMILY, FRIENDS, OR NEIGHBORS?: ONCE A WEEK
DO YOU BELONG TO ANY CLUBS OR ORGANIZATIONS SUCH AS CHURCH GROUPS UNIONS, FRATERNAL OR ATHLETIC GROUPS, OR SCHOOL GROUPS?: NO
WITHIN THE LAST YEAR, HAVE TO BEEN RAPED OR FORCED TO HAVE ANY KIND OF SEXUAL ACTIVITY BY YOUR PARTNER OR EX-PARTNER?: NO

## 2021-06-29 ASSESSMENT — ENCOUNTER SYMPTOMS
COUGH: 0
SORE THROAT: 0
BACK PAIN: 0
ABDOMINAL PAIN: 0
CONSTIPATION: 0
SHORTNESS OF BREATH: 0
ABDOMINAL DISTENTION: 0
CHEST TIGHTNESS: 0
WHEEZING: 0
DIARRHEA: 0

## 2021-06-29 ASSESSMENT — PATIENT HEALTH QUESTIONNAIRE - PHQ9
2. FEELING DOWN, DEPRESSED OR HOPELESS: 0
1. LITTLE INTEREST OR PLEASURE IN DOING THINGS: 0
SUM OF ALL RESPONSES TO PHQ QUESTIONS 1-9: 0
SUM OF ALL RESPONSES TO PHQ9 QUESTIONS 1 & 2: 0

## 2021-06-29 ASSESSMENT — LIFESTYLE VARIABLES: HOW OFTEN DO YOU HAVE A DRINK CONTAINING ALCOHOL: NEVER

## 2021-06-29 NOTE — PROGRESS NOTES
Digna Benites (:  1950) is a 79 y.o. male,Established patient, here for evaluation of the following chief complaint(s):  Gastroesophageal Reflux and Discuss Medications         ASSESSMENT/PLAN:  1. Gastroesophageal reflux disease with esophagitis without hemorrhage  2. Muscle spasm  -     aluminum hydroxide-magnesium carbonate (GAVISCON)  MG/15ML suspension; Take 15 mLs by mouth 4 times daily (with meals and nightly), Disp-1 Bottle, R-2Normal  -     methylPREDNISolone (MEDROL DOSEPACK) 4 MG tablet; Take by mouth., Disp-1 kit, R-2Normal  -     ibuprofen (ADVIL;MOTRIN) 800 MG tablet; Take 1 tablet by mouth 2 times daily as needed for Pain, Disp-60 tablet, R-0Normal  3. Herniated lumbar intervertebral disc  4. Poison ivy  -     triamcinolone (KENALOG) 0.1 % ointment; Apply topically 2 times daily for 7 days To affected area, Topical, 2 TIMES DAILY Starting 2021, Until 2021, For 7 days, Disp-1 Tube, R-2, Normal    [facility] did not approve his omeprazole  It is okay for him to stay on this  He did not find much benefit from Pepcid and the interaction between Pepcid and tizanidine is not good  Triamcinolone prescribed for poison ivy  We will have standing order for ibuprofen 800 and Medrol Dosepak if he has acute back pain issues going forward  Is already following with neurosurgery  MRI is pending    No follow-ups on file.          Subjective   SUBJECTIVE/OBJECTIVE:  HPI: 70M history of herniated lumbosacral disc  Appears to be L3-L4  Also has dyslipidemia, BPH, GERD which is severe in nature he has had frequent stress test which have all been negative please no more stress test in this patient if need be we will order a CTA    His [place] risk score is 12-1/2% he has below average risk  His ASCVD is elevated he is on 20 mg Lipitor per guidelines moderate intensity statin his last LDL was 113 and HDL was 54    He takes gabapentin as well for pain  He is back on his omeprazole and he has not tried Gaviscon    It does keep him at night sometimes    Review of Systems   Constitutional: Negative for chills, fatigue and fever. HENT: Negative for congestion, postnasal drip and sore throat. Eyes: Negative for visual disturbance. Respiratory: Negative for cough, chest tightness, shortness of breath and wheezing. Cardiovascular: Negative. Gastrointestinal: Negative for abdominal distention, abdominal pain, constipation and diarrhea. Genitourinary: Negative for difficulty urinating, dysuria, frequency and urgency. Musculoskeletal: Negative for arthralgias, back pain and joint swelling. Skin: Negative for rash. Neurological: Negative for dizziness, weakness and light-headedness. Psychiatric/Behavioral: Negative for agitation, decreased concentration and sleep disturbance. Objective   Physical Exam  Vitals and nursing note reviewed. Constitutional:       Appearance: Normal appearance. HENT:      Head: Normocephalic and atraumatic. Eyes:      Extraocular Movements: Extraocular movements intact. Cardiovascular:      Rate and Rhythm: Normal rate and regular rhythm. Pulses: Normal pulses. Heart sounds: Normal heart sounds. Pulmonary:      Effort: Pulmonary effort is normal.      Breath sounds: Normal breath sounds. Abdominal:      General: Abdomen is flat. Palpations: Abdomen is soft. Musculoskeletal:         General: Normal range of motion. Cervical back: Normal range of motion and neck supple. Skin:     General: Skin is warm. Neurological:      General: No focal deficit present. Mental Status: He is alert and oriented to person, place, and time. An electronic signature was used to authenticate this note.     --Josy Davis MD

## 2021-07-02 DIAGNOSIS — K21.00 GASTROESOPHAGEAL REFLUX DISEASE WITH ESOPHAGITIS WITHOUT HEMORRHAGE: ICD-10-CM

## 2021-07-02 NOTE — TELEPHONE ENCOUNTER
Last visit: 06/29/21   Last Med refill: 05/18/21  Does patient have enough medication for 72 hours: Yes. Pharmacy verified.     Next Visit Date:  Future Appointments   Date Time Provider Roldan Baxteri   12/29/2021  8:30 AM MD [facility] [address] Maintenance   Topic Date Due    Annual Wellness Visit (AWV)  Never done    Flu vaccine (1) 09/01/2021    Lipid screen  06/01/2022    Colon cancer screen colonoscopy  03/22/2027    DTaP/Tdap/Td vaccine (3 - Td or Tdap) 11/21/2029    Shingles Vaccine  Completed    Pneumococcal 65+ years Vaccine  Completed    COVID-19 Vaccine  Completed    AAA screen  Completed    Hepatitis C screen  Completed    Hepatitis A vaccine  Aged Out    Hepatitis B vaccine  Aged Out    Hib vaccine  Aged Out    Meningococcal (ACWY) vaccine  Aged Out       No results found for: LABA1C          ( goal A1C is < 7)   No results found for: LABMICR  LDL Cholesterol (mg/dL)   Date Value   06/01/2021 113   05/22/2020 103     LDL Calculated (mg/dL)   Date Value   05/15/2014 115       (goal LDL is <100)   AST (U/L)   Date Value   06/01/2021 35     ALT (U/L)   Date Value   06/01/2021 31     BUN (mg/dL)   Date Value   06/01/2021 13     BP Readings from Last 3 Encounters:   06/29/21 132/72   05/11/21 132/60   04/13/21 138/80          (goal 120/80)    All Future Testing planned in [facility]              Patient Active Problem List:     GERD (gastroesophageal reflux disease)     Hyperlipidemia     External hemorrhoids     Impotence of organic origin     Nocturia     Arthropathy     Benign prostatic hyperplasia with urinary obstruction     Allergic rhinitis     Rotator cuff arthropathy     Lumbar stenosis with neurogenic claudication     Lumbar spondylosis     Rotator cuff tear arthropathy     Herniated lumbar intervertebral disc     Degeneration of intervertebral disc     Biceps tendinitis     Nontraumatic tear of left rotator cuff     History of lumbar fusion Retrolisthesis of vertebrae

## 2021-07-06 RX ORDER — OMEPRAZOLE 20 MG/1
40 CAPSULE, DELAYED RELEASE ORAL DAILY
Qty: 180 CAPSULE | Refills: 3 | Status: SHIPPED | OUTPATIENT
Start: 2021-07-06 | End: 2022-03-15 | Stop reason: ALTCHOICE

## 2021-07-09 ENCOUNTER — TELEPHONE (OUTPATIENT)
Dept: FAMILY MEDICINE CLINIC | Age: 71
End: 2021-07-09

## 2021-07-09 NOTE — TELEPHONE ENCOUNTER
Patient questioning if he should be on prednisone for as long as it was prescribed. It was ordered as a medrol dose pack with 2 refills. Please advise patient. Also, patient states that [facility] sent paperwork regarding additional questions regarding his omeprazole, no paperwork found in patient's chart. Writer will contact [facility] to clarify.

## 2021-07-14 ENCOUNTER — TELEPHONE (OUTPATIENT)
Dept: FAMILY MEDICINE CLINIC | Age: 71
End: 2021-07-14

## 2021-07-14 NOTE — TELEPHONE ENCOUNTER
----- Message from Raylene Claude sent at 7/14/2021  9:49 AM EDT -----  Subject: Message to Provider    QUESTIONS  Information for Provider? Pt was contacted by [facility] saying they   couldn't fill Gaviscon prescription. They recommended changing the   prescription or asking the local pharmacy if they have it.   ---------------------------------------------------------------------------  --------------  [address]  What is the best way for the office to contact you? OK to leave message on   voicemail  Preferred Call Back Phone Number? [phone]  ---------------------------------------------------------------------------  --------------  SCRIPT ANSWERS  Relationship to Patient?  Self

## 2021-07-14 NOTE — TELEPHONE ENCOUNTER
[facility]ed [facility] and spoke to kirsten. Transferred to a Samaniego Roger who was able to tell me omeprazole was changed to 40mg once a day per us back on 7/9/2021. The only other thing in his chart is that the aluminum hydroxide-magnesium carbonate liquid. Spoke with Diego Richardson, [facility]ist for [facility]. She clarified that the aluminum hydroxide-magnesium carbonate liquid is not something that is readily available in their pharmacy and they have been unable to order it. Additionally, it is not covered on his insurance. Is this med still needed and if so, can it be switched to something that is covered?

## 2021-07-19 NOTE — TELEPHONE ENCOUNTER
Patient is scheduled 07/22/2021 for Medicare Annual Wellness and also to purchase medication over the counter

## 2021-07-22 ENCOUNTER — OFFICE VISIT (OUTPATIENT)
Dept: FAMILY MEDICINE CLINIC | Age: 71
End: 2021-07-22
Payer: MEDICARE

## 2021-07-22 VITALS
WEIGHT: 214.6 LBS | HEART RATE: 71 BPM | HEIGHT: 72 IN | OXYGEN SATURATION: 98 % | TEMPERATURE: 97.7 F | SYSTOLIC BLOOD PRESSURE: 128 MMHG | BODY MASS INDEX: 29.07 KG/M2 | RESPIRATION RATE: 13 BRPM | DIASTOLIC BLOOD PRESSURE: 74 MMHG

## 2021-07-22 DIAGNOSIS — M51.26 HERNIATED LUMBAR INTERVERTEBRAL DISC: ICD-10-CM

## 2021-07-22 DIAGNOSIS — Z00.00 ROUTINE GENERAL MEDICAL EXAMINATION AT A HEALTH CARE FACILITY: Primary | ICD-10-CM

## 2021-07-22 DIAGNOSIS — M62.838 MUSCLE SPASM: ICD-10-CM

## 2021-07-22 PROCEDURE — G0439 PPPS, SUBSEQ VISIT: HCPCS | Performed by: STUDENT IN AN ORGANIZED HEALTH CARE EDUCATION/TRAINING PROGRAM

## 2021-07-22 RX ORDER — VIT C/B6/B5/MAGNESIUM/HERB 173 50-5-6-5MG
1000 CAPSULE ORAL DAILY
COMMUNITY

## 2021-07-22 RX ORDER — IBUPROFEN 800 MG/1
800 TABLET ORAL 2 TIMES DAILY PRN
Qty: 60 TABLET | Refills: 0 | Status: SHIPPED | OUTPATIENT
Start: 2021-07-22 | End: 2021-09-10 | Stop reason: SDUPTHER

## 2021-07-22 SDOH — ECONOMIC STABILITY: INCOME INSECURITY: IN THE LAST 12 MONTHS, WAS THERE A TIME WHEN YOU WERE NOT ABLE TO PAY THE MORTGAGE OR RENT ON TIME?: NO

## 2021-07-22 ASSESSMENT — PATIENT HEALTH QUESTIONNAIRE - PHQ9
SUM OF ALL RESPONSES TO PHQ QUESTIONS 1-9: 0
SUM OF ALL RESPONSES TO PHQ9 QUESTIONS 1 & 2: 0
2. FEELING DOWN, DEPRESSED OR HOPELESS: 0
1. LITTLE INTEREST OR PLEASURE IN DOING THINGS: 0

## 2021-07-22 ASSESSMENT — SOCIAL DETERMINANTS OF HEALTH (SDOH): HOW HARD IS IT FOR YOU TO PAY FOR THE VERY BASICS LIKE FOOD, HOUSING, MEDICAL CARE, AND HEATING?: NOT HARD AT ALL

## 2021-07-22 ASSESSMENT — LIFESTYLE VARIABLES: HOW OFTEN DO YOU HAVE A DRINK CONTAINING ALCOHOL: 0

## 2021-07-22 NOTE — PROGRESS NOTES
Medicare Annual Wellness Visit  Name: Tatianna Prather Date: 2021   MRN: [id number] Sex: Male   Age: 79 y.o. Ethnicity: Non- / Non    : 1950 Race: White (non-)      Parveen Solares is here for Medicare AWV    Screenings for behavioral, psychosocial and functional/safety risks, and cognitive dysfunction are all negative except as indicated below. These results, as well as other patient data from the [address] form, are documented in Flowsheets linked to this Encounter. Allergies   Allergen Reactions    Penicillins Other (See Comments)     Other reaction(s): Rash  Pt does not recall the reaction        Prior to Visit Medications    Medication Sig Taking? Authorizing Provider   Turmeric 500 MG CAPS Take 1,000 mg by mouth daily Yes Historical Provider, MD   ibuprofen (ADVIL;MOTRIN) 800 MG tablet Take 1 tablet by mouth 2 times daily as needed for Pain Yes Xavier Lopez MD   omeprazole (PRILOSEC) 20 MG delayed release capsule Take 2 capsules by mouth Daily Yes Xavier Lopez MD   tiZANidine (ZANAFLEX) 4 MG tablet Take 4 mg by mouth every 8 hours as needed Yes Historical Provider, MD   aluminum hydroxide-magnesium carbonate (GAVISCON)  MG/15ML suspension Take 15 mLs by mouth 4 times daily (with meals and nightly) Yes Xavier Lopez MD   atorvastatin (LIPITOR) 20 MG tablet Take 1 tablet by mouth daily Yes Xavier Lopez MD   Coenzyme Q10 (COQ10) 100 MG CAPS Take by mouth daily  Yes Historical Provider, MD   gabapentin (NEURONTIN) 300 MG capsule Take 1 capsule by mouth nightly as needed (numbness and tingling.) for up to 180 days.  Intended supply: 90 days Yes Xavier Lopez MD   vitamin C (ASCORBIC ACID) 500 MG tablet Take 500 mg by mouth daily Yes Historical Provider, MD   magnesium oxide (MAG-OX) 400 MG tablet Take 400 mg by mouth every other day  Yes Historical Provider, MD   tamsulosin (FLOMAX) 0.4 MG capsule TAKE 1 CAPSULE DAILY Yes Joselyn Ugarte MD   Misc Natural Products Mandeep Gauze) CAPS Take by mouth Yes Historical Provider, MD   Omega-3 Fatty Acids (OMEGA 3 PO) Take by mouth Yes Historical Provider, MD   Saccharomyces boulardii (PROBIOTIC) 250 MG CAPS Take 1 capsule by mouth daily Yes Historical Provider, MD   cetirizine (ZYRTEC) 5 MG tablet Take 10 mg by mouth daily Yes Historical Provider, MD   FIBER PO Take 3 tablets by mouth daily  Yes Historical Provider, MD   Garlic 0082 MG CAPS Take 1 capsule by mouth 3 times daily (with meals)  Yes Historical Provider, MD       Past Medical History:   Diagnosis Date    Allergic rhinitis     Arthritis     BPH (benign prostatic hyperplasia)     Caffeine use     3 cups coffee/day    Chronic back pain     Diarrhea     ED (erectile dysfunction)     Esophageal stricture     GERD (gastroesophageal reflux disease)     Headache(784.0)     Hemorrhoids     Hyperlipemia     Rotator cuff tear, right        Past Surgical History:   Procedure Laterality Date    APPENDECTOMY      CARPAL TUNNEL RELEASE Right 2018    COLONOSCOPY      CYSTOSCOPY  09    ESOPHAGEAL DILATATION      HAND SURGERY      HERNIA REPAIR      LUMBAR LAMINECTOMY  10/17/2019    Dr Lina Rogers TOTAL HIP ARTHROPLASTY Left 2018    hip    UPPER GASTROINTESTINAL ENDOSCOPY         Family History   Problem Relation Age of Onset    Hypertension Mother             Cancer Father                CareTeam (Including outside providers/suppliers regularly involved in providing care):   Patient Care Team:  Luisito Viramontes MD as PCP - General (Internal Medicine)  Luisito Viramontes MD [facility]  Joselyn Ugarte MD as Consulting Physician (Urology)  Sagar Fu as Consulting Physician (Ophthalmology)  Pamela Sullivan MD (Gastroenterology)  Luis Alfredo Dinh MD as Consulting Physician (Orthopedic Surgery)    [facility] Readings from Last 3 Encounters:   07/22/21 214 lb 9.6 oz (97.3 kg)   06/29/21 213 lb 9.6 oz (96.9 kg)   05/11/21 222 lb (100.7 kg)     Vitals:    07/22/21 1205   BP: 128/74   Site: Left Upper Arm   Position: Sitting   Cuff Size: Large Adult   Pulse: 71   Resp: 13   Temp: 97.7 °F (36.5 °C)   TempSrc: Temporal   SpO2: 98%   Weight: 214 lb 9.6 oz (97.3 kg)   Height: 6' (1.829 m)     Body mass index is 29.1 kg/m². Based upon direct observation of the patient, evaluation of cognition reveals recent and remote memory intact. General Appearance: alert and oriented to person, place and time, well-developed and well-nourished, in no acute distress    Patient's complete Health Risk Assessment and screening values have been reviewed and are found in Flowsheets. The following problems were reviewed today and where indicated follow up appointments were made and/or referrals ordered.     Positive Risk Factor Screenings with Interventions:            Hearing/Vision:  No exam data present  Hearing/Vision  Do you or your family notice any trouble with your hearing that hasn't been managed with hearing aids?: (!) Yes (wears hearing aids)  Do you have difficulty driving, watching TV, or doing any of your daily activities because of your eyesight?: No  Have you had an eye exam within the past year?: Yes  Hearing/Vision Interventions:  · Vision concerns:  ophthalmology/optometry referral provided      Personalized Preventive Plan   Current Health Maintenance Status  Immunization History   Administered Date(s) Administered    COVID-19, Reza Peter, PF, 30mcg/0.3mL 02/11/2021, 03/04/2021    Influenza 10/24/2012, 10/09/2013    Influenza Virus Vaccine 08/27/2010, 10/10/2014, 09/21/2015    Influenza, High-dose, [place], 65 yrs +, IM (Fluzone) 10/13/2020    Influenza, Quadv, IM, (6 mo and older Fluzone, Flulaval, Fluarix and 3 yrs and older Afluria) 10/21/2016, 10/24/2017, 10/24/2018    Influenza, Quadv, IM, PF (6 mo and older Fluzone, Flulaval, Fluarix, and 3 yrs and older Afluria) 10/05/2019    Pneumococcal Conjugate 13-valent (Ranomsa77) 04/27/2016    Pneumococcal Polysaccharide ([id number]) 10/28/2003, 05/15/2017    Tdap (Boostrix, Adacel) 11/23/2009, 11/21/2019    Zoster Recombinant (Shingrix) 07/17/2019, 12/02/2019        Health Maintenance   Topic Date Due    Annual Wellness Visit (AWV)  Never done    Flu vaccine (1) 09/01/2021    Lipid screen  06/01/2022    Colon cancer screen colonoscopy  03/22/2027    DTaP/Tdap/Td vaccine (3 - Td or Tdap) 11/21/2029    Shingles Vaccine  Completed    Pneumococcal 65+ years Vaccine  Completed    COVID-19 Vaccine  Completed    AAA screen  Completed    Hepatitis C screen  Completed    Hepatitis A vaccine  Aged Out    Hepatitis B vaccine  Aged Out    Hib vaccine  Aged Out    Meningococcal (ACWY) vaccine  Aged Out     Recommendations for [place] Due: see orders and patient instructions/AVS.  . Recommended screening schedule for the next 5-10 years is provided to the patient in written form: see Patient Instructions/AVS.    Marian Paez was seen today for medicare awv. Diagnoses and all orders for this visit:    Routine general medical examination at a health care facility    Muscle spasm  -     ibuprofen (ADVIL;MOTRIN) 800 MG tablet; Take 1 tablet by mouth 2 times daily as needed for Pain             GERD increased prilosec dose last time  Back pain referral for pain mgmt - he has DJD, disc bulge,     Dyslipidemia lipitor increased to 20  Ibuprofen zanaflex OK to take  Try CBD oil  Gaviscon over the counter      Advance Care Planning   Advanced Care Planning: Discussed the patients choices for care and treatment in case of a health event that adversely affects decision-making abilities. Also discussed the patients long-term treatment options.  Reviewed with the patient the [address] [place] [address] of [facility] Declaration forms  Reviewed the process of designating a competent adult as an Agent (or -in-fact) that could take make health care decisions for the patient if incompetent. Patient was asked to complete the declaration forms, either acknowledge the forms by a public notary or an eligible witness and provide a signed copy to the practice office. Time spent (minutes): 5     Cardiovascular Disease Risk Counseling: Assessed the patient's risk to develop cardiovascular disease and reviewed main risk factors. Reviewed steps to reduce disease risk including:   · Quitting tobacco use, reducing amount smoked, or not starting the habit  · Making healthy food choices  · Being physically active and gradualy increasing activity levels   · Reduce weight and determine a healthy BMI goal  · Monitor blood pressure and treat if higher than 140/90 mmHg  · Maintain blood total cholesterol levels under 5 mmol/l or 190 mg/dl  · Maintain LDL cholesterol levels under 3.0 mmol/l or 115 mg/dl   · Control blood glucose levels  · Consider taking aspirin (75 mg daily), once blood pressure is controlled   Provided a follow up plan.   Time spent (minutes): 5

## 2021-07-22 NOTE — PATIENT INSTRUCTIONS
Advance Directives: Care Instructions  Overview  An advance directive is a legal way to state your wishes at the end of your life. It tells your family and your doctor what to do if you can't say what you want. There are two main types of advance directives. You can change them any time your wishes change. Living will. This form tells your family and your doctor your wishes about life support and other treatment. The form is also called a declaration. Medical power of . This form lets you name a person to make treatment decisions for you when you can't speak for yourself. This person is called a health care agent (health care proxy, health care surrogate). The form is also called a durable power of  for health care. If you do not have an advance directive, decisions about your medical care may be made by a family member, or by a doctor or a  who doesn't know you. It may help to think of an advance directive as a gift to the people who care for you. If you have one, they won't have to make tough decisions by themselves. Follow-up care is a key part of your treatment and safety. Be sure to make and go to all appointments, and call your doctor if you are having problems. It's also a good idea to know your test results and keep a list of the medicines you take. What should you include in an advance directive? Many states have a unique advance directive form. (It may ask you to address specific issues.) Or you might use a universal form that's approved by many states. If your form doesn't tell you what to address, it may be hard to know what to include in your advance directive. Use the questions below to help you get started. · Who do you want to make decisions about your medical care if you are not able to? · What life-support measures do you want if you have a serious illness that gets worse over time or can't be cured? · What are you most afraid of that might happen? glaucoma; cataracts, macular degeneration, and other eye disorders. · A preventive dental visit is recommended every 6 months. · Try to get at least 150 minutes of exercise per week or 10,000 steps per day on a pedometer . · Order or download the FREE \"Exercise & Physical Activity: Your Everyday Guide\" from [employer]. Call 6-785.671.3578 or search [employer] online. · You need 7993-8775 mg of calcium and 6384-7049 IU of vitamin D per day. It is possible to meet your calcium requirement with diet alone, but a vitamin D supplement is usually necessary to meet this goal.  · When exposed to the sun, use a sunscreen that protects against both UVA and UVB radiation with an SPF of 30 or greater. Reapply every 2 to 3 hours or after sweating, drying off with a towel, or swimming. · Always wear a seat belt when traveling in a car. Always wear a helmet when riding a bicycle or motorcycle. Personalized Preventive Plan for Marbin Will - 7/22/2021  Medicare offers a range of preventive health benefits. Some of the tests and screenings are paid in full while other may be subject to a deductible, co-insurance, and/or copay. Some of these benefits include a comprehensive review of your medical history including lifestyle, illnesses that may run in your family, and various assessments and screenings as appropriate. After reviewing your medical record and screening and assessments performed today your provider may have ordered immunizations, labs, imaging, and/or referrals for you. A list of these orders (if applicable) as well as your Preventive Care list are included within your After Visit Summary for your review. Other Preventive Recommendations:    · A preventive eye exam performed by an eye specialist is recommended every 1-2 years to screen for glaucoma; cataracts, macular degeneration, and other eye disorders.   · A preventive dental visit is recommended every 6 months. · Try to get at least 150 minutes of exercise per week or 10,000 steps per day on a pedometer . · Order or download the FREE \"Exercise & Physical Activity: Your Everyday Guide\" from [employer]. Call 7-829.609.1274 or search [employer] online. · You need 7523-1429 mg of calcium and 0656-8543 IU of vitamin D per day. It is possible to meet your calcium requirement with diet alone, but a vitamin D supplement is usually necessary to meet this goal.  · When exposed to the sun, use a sunscreen that protects against both UVA and UVB radiation with an SPF of 30 or greater. Reapply every 2 to 3 hours or after sweating, drying off with a towel, or swimming. · Always wear a seat belt when traveling in a car. Always wear a helmet when riding a bicycle or motorcycle.

## 2021-08-02 ENCOUNTER — HOSPITAL ENCOUNTER (OUTPATIENT)
Age: 71
Setting detail: SPECIMEN
Discharge: HOME | End: 2021-08-02
Payer: MEDICARE

## 2021-08-02 ENCOUNTER — OFFICE VISIT (OUTPATIENT)
Dept: FAMILY MEDICINE CLINIC | Age: 71
End: 2021-08-02
Payer: MEDICARE

## 2021-08-02 VITALS
WEIGHT: 214.6 LBS | HEART RATE: 80 BPM | TEMPERATURE: 97.5 F | OXYGEN SATURATION: 98 % | RESPIRATION RATE: 12 BRPM | HEIGHT: 72 IN | BODY MASS INDEX: 29.07 KG/M2 | SYSTOLIC BLOOD PRESSURE: 136 MMHG | DIASTOLIC BLOOD PRESSURE: 70 MMHG

## 2021-08-02 DIAGNOSIS — I20.8 OTHER FORMS OF ANGINA PECTORIS (HCC): ICD-10-CM

## 2021-08-02 DIAGNOSIS — R07.9 CHEST PAIN, UNSPECIFIED TYPE: ICD-10-CM

## 2021-08-02 DIAGNOSIS — I20.8 OTHER FORMS OF ANGINA PECTORIS (HCC): Primary | ICD-10-CM

## 2021-08-02 DIAGNOSIS — R06.02 SHORTNESS OF BREATH: ICD-10-CM

## 2021-08-02 LAB
BNP INTERPRETATION: NORMAL
PRO-BNP: 162 PG/ML
TROPONIN INTERP: NORMAL
TROPONIN T: NORMAL NG/ML
TROPONIN, HIGH SENSITIVITY: 16 NG/L (ref 0–22)

## 2021-08-02 PROCEDURE — 99213 OFFICE O/P EST LOW 20 MIN: CPT | Performed by: STUDENT IN AN ORGANIZED HEALTH CARE EDUCATION/TRAINING PROGRAM

## 2021-08-02 PROCEDURE — 36415 COLL VENOUS BLD VENIPUNCTURE: CPT | Performed by: STUDENT IN AN ORGANIZED HEALTH CARE EDUCATION/TRAINING PROGRAM

## 2021-08-02 PROCEDURE — 93000 ELECTROCARDIOGRAM COMPLETE: CPT | Performed by: STUDENT IN AN ORGANIZED HEALTH CARE EDUCATION/TRAINING PROGRAM

## 2021-08-02 RX ORDER — CARVEDILOL 6.25 MG/1
6.25 TABLET ORAL DAILY
Qty: 90 TABLET | Refills: 1 | Status: SHIPPED | OUTPATIENT
Start: 2021-08-02 | End: 2021-10-11 | Stop reason: SDUPTHER

## 2021-08-02 RX ORDER — NITROGLYCERIN 0.3 MG/1
0.3 TABLET SUBLINGUAL EVERY 5 MIN PRN
Qty: 30 TABLET | Refills: 3 | Status: SHIPPED | OUTPATIENT
Start: 2021-08-02 | End: 2021-10-11 | Stop reason: SDUPTHER

## 2021-08-02 NOTE — PROGRESS NOTES
Marbin Will (:  1950) is a 79 y.o. male,Established patient, here for evaluation of the following chief complaint(s):  Chest Pain         ASSESSMENT/PLAN:  1. Other forms of angina pectoris ([address]  -     Troponin I; Future  -     Brain Natriuretic Peptide; Future  2. Shortness of breath   -     Brain Natriuretic Peptide; Future  3. Chest pain, unspecified type  -     EKG 12 lead; Future  -     EKG 12 lead; Future  -     CT CARDIAC CALCIUM SCORING; Future  -     carvedilol (COREG) 6.25 MG tablet; Take 1 tablet by mouth daily, Disp-90 tablet, R-1Normal  -     nitroGLYCERIN (NITROSTAT) 0.3 MG SL tablet; Place 1 tablet under the tongue every 5 minutes as needed for Chest pain up to max of 3 total doses. If no relief after 1 dose, call 911., Disp-30 tablet, R-3Normal    Symptoms are likely related to GI etiology specifically esophagus he has had previous dilation in the past  His previous stress test was reviewed and I do not think this is related to cardiac issues  However out of an abundance of caution I think it is warranted to have a coronary calcium test performed I explained to him the out-of-pocket cost between $50 and $80  This will give us a definitive answer if he has any plaque burden whatsoever in his coronary arteries  In the interim I will prescribe carvedilol nitroglycerin to see if this helps with his chest pain  Nitroglycerin has the added benefit of alleviating esophageal spasm which she may be experiencing  Carvedilol will help with his blood pressure as it is elevated    No follow-ups on file.          Subjective   SUBJECTIVE/OBJECTIVE:  HPI: 70-year-old male with a history of recalcitrant GERD esophagitis previous esophageal dilation either due to Schatzki's ring or severe esophagitis presents because he is having persistent chest pain that comes and goes not a pressure type pain does not radiate to his arm or jaw but is more of an intense discomfort    He has not tried nitroglycerin because he does not have any  Is not relieved with rest  Is not brought about by exertion or stress  We recently increased his dose of PPI and Pepcid I also prescribed Gaviscon    He has not had an opportunity to try the Gaviscon but I think that would be of great benefit for him    12 point ROS otherwise negative    Review of Systems   12 point ROS per HPI       Objective   Physical Exam    Alert and oriented to person place and time  Normocephalic atraumatic EOMI  He wears corrective lenses  He is a normal build he is not overweight  Pulses are palpable  Regular rate and rhythm  Respiratory effort normal  Normal affect and mood        An electronic signature was used to authenticate this note.     --Shameka Davis MD

## 2021-08-09 ENCOUNTER — TELEPHONE (OUTPATIENT)
Dept: FAMILY MEDICINE CLINIC | Age: 71
End: 2021-08-09

## 2021-08-09 NOTE — TELEPHONE ENCOUNTER
----- Message from Americo Cadena sent at 8/6/2021  3:04 PM EDT -----  Subject: Message to Provider    QUESTIONS  Information for Provider? Patient is wanting to know if medication for   Gabapentin can be upped to 2-3 times of day because his back doctor   requested that he should.  ---------------------------------------------------------------------------  --------------  [address]  What is the best way for the office to contact you? OK to leave message on   voicemail  Preferred Call Back Phone Number? [phone]  ---------------------------------------------------------------------------  --------------  SCRIPT ANSWERS  Relationship to Patient?  Self

## 2021-08-11 ENCOUNTER — TELEPHONE (OUTPATIENT)
Dept: FAMILY MEDICINE CLINIC | Age: 71
End: 2021-08-11

## 2021-08-11 DIAGNOSIS — M51.26 HERNIATED LUMBAR INTERVERTEBRAL DISC: ICD-10-CM

## 2021-08-11 RX ORDER — GABAPENTIN 300 MG/1
300 CAPSULE ORAL 3 TIMES DAILY
Qty: 90 CAPSULE | Refills: 1 | Status: SHIPPED | OUTPATIENT
Start: 2021-08-11 | End: 2021-09-23 | Stop reason: SDUPTHER

## 2021-08-11 NOTE — TELEPHONE ENCOUNTER
Patient contacted office requesting an increase in his Gabapentin. Patient states he discussed with  about possibly increased to BID or TID. Patient states his back doctor also suggested an increase. If ok patient would like medication sent to [facility].

## 2021-08-11 NOTE — TELEPHONE ENCOUNTER
Already sent left patient a voicemail letting patient know provider did increase medication and medication was sent to [facility]

## 2021-09-09 DIAGNOSIS — M62.838 MUSCLE SPASM: ICD-10-CM

## 2021-09-09 NOTE — TELEPHONE ENCOUNTER
Last visit: 08/02/21  Last Med refill: 07/22/21  Does patient have enough medication for 72 hours: Yes.  Pharmacy verified  Would like 3 months sent    Next Visit Date:  Future Appointments   Date Time Provider Roldan Shaikh   12/29/2021  8:30 AM Leeann Jolley MD [facility] [address] Maintenance   Topic Date Due    Flu vaccine (1) 09/01/2021    Lipid screen  06/01/2022    Annual Wellness Visit (AWV)  07/23/2022    Colon cancer screen colonoscopy  03/22/2027    DTaP/Tdap/Td vaccine (3 - Td or Tdap) 11/21/2029    Shingles Vaccine  Completed    Pneumococcal 65+ years Vaccine  Completed    COVID-19 Vaccine  Completed    AAA screen  Completed    Hepatitis C screen  Completed    Hepatitis A vaccine  Aged Out    Hepatitis B vaccine  Aged Out    Hib vaccine  Aged Out    Meningococcal (ACWY) vaccine  Aged Out       No results found for: LABA1C          ( goal A1C is < 7)   No results found for: LABMICR  LDL Cholesterol (mg/dL)   Date Value   06/01/2021 113   05/22/2020 103     LDL Calculated (mg/dL)   Date Value   05/15/2014 115       (goal LDL is <100)   AST (U/L)   Date Value   06/01/2021 35     ALT (U/L)   Date Value   06/01/2021 31     BUN (mg/dL)   Date Value   06/01/2021 13     BP Readings from Last 3 Encounters:   08/23/21 (!) 151/91   08/02/21 136/70   07/22/21 128/74          (goal 120/80)    All Future Testing planned in [facility]  Lab Frequency Next Occurrence   EKG 12 lead Once 09/30/2021   CT CARDIAC CALCIUM SCORING Once 06/01/2022               Patient Active Problem List:     GERD (gastroesophageal reflux disease)     Hyperlipidemia     External hemorrhoids     Other male erectile dysfunction     Nocturia     Arthropathy     Benign prostatic hyperplasia with urinary obstruction     Allergic rhinitis     Rotator cuff arthropathy     Lumbar stenosis with neurogenic claudication     Lumbar spondylosis     Rotator cuff tear arthropathy     Herniated lumbar intervertebral disc Degeneration of intervertebral disc     Biceps tendinitis     Nontraumatic tear of left rotator cuff     History of lumbar fusion     Retrolisthesis of vertebrae

## 2021-09-10 RX ORDER — IBUPROFEN 800 MG/1
800 TABLET ORAL 2 TIMES DAILY PRN
Qty: 60 TABLET | Refills: 0 | Status: SHIPPED | OUTPATIENT
Start: 2021-09-10 | End: 2021-11-12

## 2021-09-14 ENCOUNTER — TELEPHONE (OUTPATIENT)
Dept: FAMILY MEDICINE CLINIC | Age: 71
End: 2021-09-14

## 2021-09-14 NOTE — TELEPHONE ENCOUNTER
Patient would like to have referral for physical therapy for his right shoulder and neck. He is waiting to get into a specialist to be seen for this issue so he would like to do PT before the appt. He would like to go to [facility] here in [place]. Their fax number is [phone].

## 2021-09-17 DIAGNOSIS — M54.2 CERVICALGIA: Primary | ICD-10-CM

## 2021-09-21 DIAGNOSIS — R07.9 CHEST PAIN, UNSPECIFIED TYPE: ICD-10-CM

## 2021-09-23 ENCOUNTER — NURSE ONLY (OUTPATIENT)
Dept: FAMILY MEDICINE CLINIC | Age: 71
End: 2021-09-23
Payer: MEDICARE

## 2021-09-23 VITALS
OXYGEN SATURATION: 96 % | RESPIRATION RATE: 14 BRPM | HEIGHT: 72 IN | BODY MASS INDEX: 28.79 KG/M2 | WEIGHT: 212.6 LBS | HEART RATE: 87 BPM | TEMPERATURE: 98.3 F

## 2021-09-23 DIAGNOSIS — E78.2 MIXED HYPERLIPIDEMIA: Chronic | ICD-10-CM

## 2021-09-23 DIAGNOSIS — M51.26 HERNIATED LUMBAR INTERVERTEBRAL DISC: ICD-10-CM

## 2021-09-23 DIAGNOSIS — Z23 NEED FOR INFLUENZA VACCINATION: Primary | ICD-10-CM

## 2021-09-23 DIAGNOSIS — I25.10 CORONARY ARTERY DISEASE INVOLVING NATIVE CORONARY ARTERY OF NATIVE HEART WITHOUT ANGINA PECTORIS: Primary | ICD-10-CM

## 2021-09-23 PROBLEM — M54.2 NECK PAIN: Status: ACTIVE | Noted: 2021-09-22

## 2021-09-23 PROBLEM — M50.30 DDD (DEGENERATIVE DISC DISEASE), CERVICAL: Status: ACTIVE | Noted: 2019-10-07

## 2021-09-23 PROCEDURE — G0008 ADMIN INFLUENZA VIRUS VAC: HCPCS | Performed by: STUDENT IN AN ORGANIZED HEALTH CARE EDUCATION/TRAINING PROGRAM

## 2021-09-23 PROCEDURE — 90694 VACC AIIV4 NO PRSRV 0.5ML IM: CPT | Performed by: STUDENT IN AN ORGANIZED HEALTH CARE EDUCATION/TRAINING PROGRAM

## 2021-09-23 RX ORDER — ASPIRIN 81 MG/1
81 TABLET ORAL DAILY
Qty: 90 TABLET | Refills: 1 | Status: SHIPPED | OUTPATIENT
Start: 2021-09-23 | End: 2022-03-30 | Stop reason: SDUPTHER

## 2021-09-23 RX ORDER — FLUTICASONE PROPIONATE 50 MCG
1 SPRAY, SUSPENSION (ML) NASAL NIGHTLY
COMMUNITY
End: 2021-10-11

## 2021-09-23 RX ORDER — ATORVASTATIN CALCIUM 80 MG/1
80 TABLET, FILM COATED ORAL DAILY
Qty: 90 TABLET | Refills: 1 | Status: SHIPPED | OUTPATIENT
Start: 2021-09-23 | End: 2022-03-15

## 2021-09-23 RX ORDER — ACETAMINOPHEN 500 MG
800 TABLET ORAL EVERY 6 HOURS PRN
COMMUNITY

## 2021-09-23 RX ORDER — FAMOTIDINE 20 MG/1
20 TABLET, FILM COATED ORAL
COMMUNITY
Start: 2021-05-11 | End: 2021-10-04

## 2021-09-23 RX ORDER — TADALAFIL 5 MG/1
5 TABLET ORAL DAILY
COMMUNITY
End: 2021-10-04

## 2021-09-23 RX ORDER — OMEPRAZOLE 40 MG/1
CAPSULE, DELAYED RELEASE ORAL
COMMUNITY
Start: 2021-09-16 | End: 2021-10-04

## 2021-09-23 RX ORDER — GABAPENTIN 300 MG/1
300 CAPSULE ORAL 3 TIMES DAILY
Qty: 270 CAPSULE | Refills: 1 | Status: SHIPPED | OUTPATIENT
Start: 2021-09-23 | End: 2022-07-05

## 2021-09-23 ASSESSMENT — PATIENT HEALTH QUESTIONNAIRE - PHQ9
SUM OF ALL RESPONSES TO PHQ QUESTIONS 1-9: 0
SUM OF ALL RESPONSES TO PHQ QUESTIONS 1-9: 0
2. FEELING DOWN, DEPRESSED OR HOPELESS: 0
1. LITTLE INTEREST OR PLEASURE IN DOING THINGS: 0
SUM OF ALL RESPONSES TO PHQ9 QUESTIONS 1 & 2: 0
SUM OF ALL RESPONSES TO PHQ QUESTIONS 1-9: 0

## 2021-09-23 NOTE — PATIENT INSTRUCTIONS
Patient Education        Influenza (Flu) Vaccine: Care Instructions  Your Care Instructions     Influenza (flu) is an infection in the lungs and breathing passages. It is caused by the influenza virus. There are different strains, or types, of the flu virus every year. The flu comes on quickly. It can cause a cough, stuffy nose, fever, chills, tiredness, and aches and pains. These symptoms may last up to 10 days. The flu can make you feel very sick, but most of the time it doesn't lead to other problems. But it can cause serious problems in people who are older or who have a long-term illness, such as heart disease or diabetes. You can help prevent the flu by getting a flu vaccine every year, as soon as it is available. You cannot get the flu from the vaccine. The vaccine prevents most cases of the flu. But even when the vaccine doesn't prevent the flu, it can make symptoms less severe and reduce the chance of problems from the flu. Sometimes, young children and people who have an immune system problem may have a slight fever or muscle aches or pains 6 to 12 hours after getting the shot. These symptoms usually last 1 or 2 days. Follow-up care is a key part of your treatment and safety. Be sure to make and go to all appointments, and call your doctor if you are having problems. It's also a good idea to know your test results and keep a list of the medicines you take. Who should get the flu vaccine? Everyone age 7 months or older should get a flu vaccine each year. It lowers the chance of getting and spreading the flu. The vaccine is very important for people who are at high risk for getting other health problems from the flu. This includes:  · Anyone 48years of age or older. · People who live in a long-term care center, such as a nursing home. · All children 6 months through 25years of age. · Adults and children 6 months and older who have long-term heart or lung problems, such as asthma.   · Adults and children 6 months and older who needed medical care or were in a hospital during the past year because of diabetes, chronic kidney disease, or a weak immune system (including HIV or AIDS). · Women who will be pregnant during the flu season. · People who have any condition that can make it hard to breathe or swallow (such as a brain injury or muscle disorders). · People who can give the flu to others who are at high risk for problems from the flu. This includes all health care workers and close contacts of people age 72 or older. Who should not get the flu vaccine? The person who gives the vaccine may tell you not to get it if you:  · Have a severe allergy to eggs or any part of the vaccine. · Have had a severe reaction to a flu vaccine in the past.  · Have had Guillain-Barré syndrome (GBS). · Are sick with a fever. (Get the vaccine when symptoms are gone.)  How can you care for yourself at home? · If you or your child has a sore arm or a slight fever after the vaccine, take an over-the-counter pain medicine, such as acetaminophen (Tylenol) or ibuprofen (Advil, Motrin). Read and follow all instructions on the label. Do not give aspirin to anyone younger than 20. It has been linked to Reye syndrome, a serious illness. · Do not take two or more pain medicines at the same time unless the doctor told you to. Many pain medicines have acetaminophen, which is Tylenol. Too much acetaminophen (Tylenol) can be harmful. When should you call for help? Call 911 anytime you think you may need emergency care. For example, call if after getting the flu vaccine:    · You have symptoms of a severe reaction to the flu vaccine. Symptoms of a severe reaction may include:  ? Severe difficulty breathing. ? Sudden raised, red areas (hives) all over your body. ? Severe lightheadedness.    Call your doctor now or seek immediate medical care if after getting the flu vaccine:    · You think you are having a reaction to the flu vaccine, such as a new fever. Watch closely for changes in your health, and be sure to contact your doctor if you have any problems. Where can you learn more? Go to https://[employer]. org and sign in to your Voxbone account. Enter V835 in the Clipcopia box to learn more about \"Influenza (Flu) Vaccine: Care Instructions. \"     If you do not have an account, please click on the \"Sign Up Now\" link. Current as of: August 31, 2020               Content Version: 13.0  © 2055-0705 Healthwise, Incorporated. Care instructions adapted under license by [facility] ([facility]). If you have questions about a medical condition or this instruction, always ask your healthcare professional. [address] any warranty or liability for your use of this information.

## 2021-09-23 NOTE — PROGRESS NOTES
Vaccine Information Sheet, \"Influenza - Inactivated\"  given to Pearl Esqueda, or parent/legal guardian of  Pearl Esqueda and verbalized understanding. Patient responses:    Have you ever had a reaction to a flu vaccine? No  Do you have any current illness? No  Have you ever had Guillian Leonard Syndrome? No  Do you have a serious allergy to any of the following: Neomycin, Polymyxin, Thimerosal, eggs or egg products? No    Flu vaccine given per order. Please see immunization tab. Risks and benefits explained. Current VIS given.       Immunizations Administered     Name Date Dose Route    Influenza, High-dose, Quadv, 65 yrs +, IM (Fluzone) 9/23/2021 0.7 mL Intramuscular    Site: Deltoid- Left    Lot: [id number]    NDC: 56365-980-57

## 2021-10-04 ENCOUNTER — VIRTUAL VISIT (OUTPATIENT)
Dept: FAMILY MEDICINE CLINIC | Age: 71
End: 2021-10-04
Payer: MEDICARE

## 2021-10-04 DIAGNOSIS — L02.31 CELLULITIS AND ABSCESS OF BUTTOCK: Primary | ICD-10-CM

## 2021-10-04 DIAGNOSIS — L03.317 CELLULITIS AND ABSCESS OF BUTTOCK: Primary | ICD-10-CM

## 2021-10-04 DIAGNOSIS — K21.00 GASTROESOPHAGEAL REFLUX DISEASE WITH ESOPHAGITIS WITHOUT HEMORRHAGE: ICD-10-CM

## 2021-10-04 PROCEDURE — 99214 OFFICE O/P EST MOD 30 MIN: CPT | Performed by: STUDENT IN AN ORGANIZED HEALTH CARE EDUCATION/TRAINING PROGRAM

## 2021-10-04 RX ORDER — SULFAMETHOXAZOLE AND TRIMETHOPRIM 800; 160 MG/1; MG/1
1 TABLET ORAL 2 TIMES DAILY
Qty: 14 TABLET | Refills: 0 | Status: SHIPPED | OUTPATIENT
Start: 2021-10-04 | End: 2021-10-11

## 2021-10-04 RX ORDER — PANTOPRAZOLE SODIUM 20 MG/1
20 TABLET, DELAYED RELEASE ORAL
Qty: 90 TABLET | Refills: 1 | Status: SHIPPED | OUTPATIENT
Start: 2021-10-04 | End: 2022-08-08

## 2021-10-04 ASSESSMENT — ENCOUNTER SYMPTOMS
DIARRHEA: 0
BACK PAIN: 0
COUGH: 0
WHEEZING: 0
SHORTNESS OF BREATH: 0
ABDOMINAL DISTENTION: 0
CHEST TIGHTNESS: 0
SORE THROAT: 0
ABDOMINAL PAIN: 0
CONSTIPATION: 0

## 2021-10-04 NOTE — PROGRESS NOTES
Kellie Moran (:  1950) is a 70 y.o. male,Established patient, here for evaluation of the following chief complaint(s):  Skin Problem (Boil on Buttocks) and Sinusitis         ASSESSMENT/PLAN:  1. Cellulitis and abscess of buttock  -     sulfamethoxazole-trimethoprim (BACTRIM DS;SEPTRA DS) 800-160 MG per tablet; Take 1 tablet by mouth 2 times daily for 7 days, Disp-14 tablet, R-0Normal  -     mupirocin (BACTROBAN) 2 % ointment; Apply 3 times daily. , Disp-30 g, R-1, Normal  2. Gastroesophageal reflux disease with esophagitis without hemorrhage  -     pantoprazole (PROTONIX) 20 MG tablet; Take 1 tablet by mouth every morning (before breakfast), Disp-90 tablet, R-1Normal    Bactrim  mupicorin  No heat - use cold  protonix instead of prilosec - cost  40 mg lipitor    No follow-ups on file. Subjective   SUBJECTIVE/OBJECTIVE:  HPI: 70 M presents with buttock abscess    Prilosec too expensive  Can try protonix    Has congestion as well related to allergies and recent walk thru [employer] on Friday. Takes zyrtec- should add afrin    Review of Systems   Constitutional: Negative for chills, fatigue and fever. HENT: Negative for congestion, postnasal drip and sore throat. Eyes: Negative for visual disturbance. Respiratory: Negative for cough, chest tightness, shortness of breath and wheezing. Cardiovascular: Negative. Gastrointestinal: Negative for abdominal distention, abdominal pain, constipation and diarrhea. Genitourinary: Negative for difficulty urinating, dysuria, frequency and urgency. Musculoskeletal: Negative for arthralgias, back pain and joint swelling. Skin: Negative for rash. Neurological: Negative for dizziness, weakness and light-headedness. Psychiatric/Behavioral: Negative for agitation, decreased concentration and sleep disturbance.            Objective   Physical Exam abscess on left buttock  Hard indurated, but not expressing fluid         An electronic signature was used to authenticate this note.     --Joey Bustamante MD

## 2021-10-09 ENCOUNTER — OFFICE VISIT (OUTPATIENT)
Dept: PRIMARY CARE CLINIC | Age: 71
End: 2021-10-09
Payer: MEDICARE

## 2021-10-09 ENCOUNTER — HOSPITAL ENCOUNTER (OUTPATIENT)
Age: 71
Setting detail: SPECIMEN
Discharge: HOME | End: 2021-10-09
Payer: MEDICARE

## 2021-10-09 VITALS
TEMPERATURE: 99.9 F | WEIGHT: 212 LBS | DIASTOLIC BLOOD PRESSURE: 86 MMHG | OXYGEN SATURATION: 97 % | BODY MASS INDEX: 28.75 KG/M2 | SYSTOLIC BLOOD PRESSURE: 134 MMHG | HEART RATE: 65 BPM

## 2021-10-09 DIAGNOSIS — J01.90 ACUTE SINUSITIS, RECURRENCE NOT SPECIFIED, UNSPECIFIED LOCATION: ICD-10-CM

## 2021-10-09 DIAGNOSIS — R51.9 NONINTRACTABLE HEADACHE, UNSPECIFIED CHRONICITY PATTERN, UNSPECIFIED HEADACHE TYPE: Primary | ICD-10-CM

## 2021-10-09 PROCEDURE — 99213 OFFICE O/P EST LOW 20 MIN: CPT | Performed by: PHYSICIAN ASSISTANT

## 2021-10-09 PROCEDURE — 96372 THER/PROPH/DIAG INJ SC/IM: CPT | Performed by: PHYSICIAN ASSISTANT

## 2021-10-09 RX ORDER — KETOROLAC TROMETHAMINE 30 MG/ML
60 INJECTION, SOLUTION INTRAMUSCULAR; INTRAVENOUS ONCE
Status: COMPLETED | OUTPATIENT
Start: 2021-10-09 | End: 2021-10-09

## 2021-10-09 RX ADMIN — KETOROLAC TROMETHAMINE 60 MG: 30 INJECTION, SOLUTION INTRAMUSCULAR; INTRAVENOUS at 14:45

## 2021-10-09 ASSESSMENT — ENCOUNTER SYMPTOMS
HOARSE VOICE: 0
GASTROINTESTINAL NEGATIVE: 1
CHEST TIGHTNESS: 0
SORE THROAT: 0
SWOLLEN GLANDS: 0
SHORTNESS OF BREATH: 0
COUGH: 0
SINUS PRESSURE: 1
EYES NEGATIVE: 1

## 2021-10-09 NOTE — PATIENT INSTRUCTIONS
Patient Education        Sinusitis: Care Instructions  Your Care Instructions     Sinusitis is an infection of the lining of the sinus cavities in your head. Sinusitis often follows a cold. It causes pain and pressure in your head and face. In most cases, sinusitis gets better on its own in 1 to 2 weeks. But some mild symptoms may last for several weeks. Sometimes antibiotics are needed. Follow-up care is a key part of your treatment and safety. Be sure to make and go to all appointments, and call your doctor if you are having problems. It's also a good idea to know your test results and keep a list of the medicines you take. How can you care for yourself at home? · Take an over-the-counter pain medicine, such as acetaminophen (Tylenol), ibuprofen (Advil, Motrin), or naproxen (Aleve). Read and follow all instructions on the label. · If the doctor prescribed antibiotics, take them as directed. Do not stop taking them just because you feel better. You need to take the full course of antibiotics. · Be careful when taking over-the-counter cold or flu medicines and Tylenol at the same time. Many of these medicines have acetaminophen, which is Tylenol. Read the labels to make sure that you are not taking more than the recommended dose. Too much acetaminophen (Tylenol) can be harmful. · Breathe warm, moist air from a steamy shower, a hot bath, or a sink filled with hot water. Avoid cold, dry air. Using a humidifier in your home may help. Follow the directions for cleaning the machine. · Use saline (saltwater) nasal washes. This can help keep your nasal passages open and wash out mucus and bacteria. You can buy saline nose drops at a grocery store or drugstore. Or you can make your own at home by adding 1 teaspoon of salt and 1 teaspoon of baking soda to 2 cups of distilled water. If you make your own, fill a bulb syringe with the solution, insert the tip into your nostril, and squeeze gently.  Janece Smoke your and safety. Be sure to make and go to all appointments, and call your doctor if you are having problems. It's also a good idea to know your test results and keep a list of the medicines you take. How can you care for yourself at home? · You can buy premixed saline solution in a squeeze bottle or other sinus rinse products at a drugstore. Read and follow the instructions on the label. · You also can make your own saline solution by adding 1 teaspoon of salt and 1 teaspoon of baking soda to 2 cups of distilled water. · If you use a homemade solution, pour a small amount into a clean bowl. Using a rubber bulb syringe, squeeze the syringe and place the tip in the salt water. Pull a small amount of the salt water into the syringe by relaxing your hand. · Sit down with your head tilted slightly back. Do not lie down. Put the tip of the bulb syringe or the squeeze bottle a little way into one of your nostrils. Gently drip or squirt a few drops into the nostril. Repeat with the other nostril. Some sneezing and gagging are normal at first.  · Gently blow your nose. · Wipe the syringe or bottle tip clean after each use. · Repeat this 2 or 3 times a day. · Use nasal washes gently if you have nosebleeds often. When should you call for help? Watch closely for changes in your health, and be sure to contact your doctor if:    · You often get nosebleeds.     · You have problems doing the nasal washes. Where can you learn more? Go to https://B[employer]tagzeynep.[facility]. org and sign in to your Securens account. Enter [phone] [id number] [phone] in the [facility] box to learn more about \"Saline Nasal Washes: Care Instructions. \"     If you do not have an account, please click on the \"Sign Up Now\" link. Current as of: December 2, 2020               Content Version: 13.0  © 5659-2458 Healthwise, Incorporated. Care instructions adapted under license by [facility] ([facility]).  If you have questions about a medical condition or this instruction, always ask your healthcare professional. [place] [address] any warranty or liability for your use of this information. Patient Education        Headache: Care Instructions  Your Care Instructions     Headaches have many possible causes. Most headaches aren't a sign of a more serious problem, and they will get better on their own. Home treatment may help you feel better faster. The doctor has checked you carefully, but problems can develop later. If you notice any problems or new symptoms, get medical treatment right away. Follow-up care is a key part of your treatment and safety. Be sure to make and go to all appointments, and call your doctor if you are having problems. It's also a good idea to know your test results and keep a list of the medicines you take. How can you care for yourself at home? · Do not drive if you have taken a prescription pain medicine. · Rest in a quiet, dark room until your headache is gone. Close your eyes and try to relax or go to sleep. Don't watch TV or read. · Put a cold, moist cloth or cold pack on the painful area for 10 to 20 minutes at a time. Put a thin cloth between the cold pack and your skin. · Use a warm, moist towel or a heating pad set on low to relax tight shoulder and neck muscles. · Have someone gently massage your neck and shoulders. · Take pain medicines exactly as directed. ? If the doctor gave you a prescription medicine for pain, take it as prescribed. ? If you are not taking a prescription pain medicine, ask your doctor if you can take an over-the-counter medicine. · Be careful not to take pain medicine more often than the instructions allow, because you may get worse or more frequent headaches when the medicine wears off. · Do not ignore new symptoms that occur with a headache, such as a fever, weakness or numbness, vision changes, or confusion. These may be signs of a more serious problem.   To prevent headaches  · Keep a headache diary so you can figure out what triggers your headaches. Avoiding triggers may help you prevent headaches. Record when each headache began, how long it lasted, and what the pain was like (throbbing, aching, stabbing, or dull). Write down any other symptoms you had with the headache, such as nausea, flashing lights or dark spots, or sensitivity to bright light or loud noise. Note if the headache occurred near your period. List anything that might have triggered the headache, such as certain foods (chocolate, cheese, wine) or odors, smoke, bright light, stress, or lack of sleep. · Find healthy ways to deal with stress. Headaches are most common during or right after stressful times. Take time to relax before and after you do something that has caused a headache in the past.  · Try to keep your muscles relaxed by keeping good posture. Check your jaw, face, neck, and shoulder muscles for tension, and try relaxing them. When sitting at a desk, change positions often, and stretch for 30 seconds each hour. · Get plenty of sleep and exercise. · Eat regularly and well. Long periods without food can trigger a headache. · Treat yourself to a massage. Some people find that regular massages are very helpful in relieving tension. · Limit caffeine by not drinking too much coffee, tea, or soda. But don't quit caffeine suddenly, because that can also give you headaches. · Reduce eyestrain from computers by blinking frequently and looking away from the computer screen every so often. Make sure you have proper eyewear and that your monitor is set up properly, about an arm's length away. · Seek help if you have depression or anxiety. Your headaches may be linked to these conditions. Treatment can both prevent headaches and help with symptoms of anxiety or depression. When should you call for help? Call 911 anytime you think you may need emergency care.  For example, call if:    · You have signs of a stroke. These may include:  ? Sudden numbness, paralysis, or weakness in your face, arm, or leg, especially on only one side of your body. ? Sudden vision changes. ? Sudden trouble speaking. ? Sudden confusion or trouble understanding simple statements. ? Sudden problems with walking or balance. ? A sudden, severe headache that is different from past headaches. Call your doctor now or seek immediate medical care if:    · You have a new or worse headache.     · Your headache gets much worse. Where can you learn more? Go to https://[employer]. org and sign in to your VetDC account. Enter [id number] in the Queplix box to learn more about \"Headache: Care Instructions. \"     If you do not have an account, please click on the \"Sign Up Now\" link. Current as of: April 8, 2021               Content Version: 13.0  © 6001-7871 Healthwise, Incorporated. Care instructions adapted under license by [facility] ([facility]). If you have questions about a medical condition or this instruction, always ask your healthcare professional. [address] any warranty or liability for your use of this information.

## 2021-10-09 NOTE — PROGRESS NOTES
[place] [address]   [address] [facility]  Phone: [phone]  Fax: Salvador Collins    Pt Name: Tobias Aguilar  MRN: [id number]  Rhondagfgudelia 1950  Date of evaluation: 10/9/2021  Provider: James Bynum PA-C     CHIEF COMPLAINT       Chief Complaint   Patient presents with    Headache     On an antibiotic- thinks its from that. Woke up this morning with a horrible headache- sinus congestion and drainage. HISTORY OF PRESENT ILLNESS  (Location/Symptom, Timing/Onset, Context/Setting, Quality, Duration, Modifying Factors, Severity.)   Tobias Aguilar is a 70 y.o. White (non-) [1] male who presents to the office for evaluation of      Patient currently on Keflex and Bactrim for a gluteal abscess. Sinusitis  This is a new problem. The current episode started today. There has been no fever. His pain is at a severity of 7/10. The pain is moderate. Associated symptoms include congestion, headaches, neck pain and sinus pressure. Pertinent negatives include no chills, coughing, diaphoresis, ear pain, hoarse voice, shortness of breath, sneezing, sore throat or swollen glands. Past treatments include nothing. Nursing Notes were reviewed. REVIEW OF SYSTEMS    (2-9 systems for level 4, 10 or more for level 5)     Review of Systems   Constitutional: Negative for chills and diaphoresis. HENT: Positive for congestion and sinus pressure. Negative for ear pain, hoarse voice, postnasal drip, sneezing and sore throat. Eyes: Negative. Respiratory: Negative for cough, chest tightness and shortness of breath. Cardiovascular: Negative. Gastrointestinal: Negative. Genitourinary: Negative. Musculoskeletal: Positive for neck pain. Neurological: Positive for headaches. Except as noted above the remainder of the review of systems was reviewed andnegative. PAST MEDICAL HISTORY   History reviewed.     Past Medical History:   Diagnosis Date    Allergic rhinitis     Arthritis     BPH (benign prostatic hyperplasia)     Caffeine use     3 cups coffee/day    Chronic back pain     Diarrhea     ED (erectile dysfunction)     Esophageal stricture     GERD (gastroesophageal reflux disease)     Headache(784.0)     Hemorrhoids     Hyperlipemia     Rotator cuff tear, right 2012         SURGICAL HISTORY     History reviewed. Past Surgical History:   Procedure Laterality Date    APPENDECTOMY      CARPAL TUNNEL RELEASE Right 02/2018    COLONOSCOPY      CYSTOSCOPY  5/4/09    ESOPHAGEAL DILATATION      HAND SURGERY      HERNIA REPAIR      LUMBAR LAMINECTOMY  10/17/2019    Dr Ramiro Au      TOE SURGERY      TOTAL HIP ARTHROPLASTY Left 06/25/2018    hip    UPPER GASTROINTESTINAL ENDOSCOPY           CURRENT MEDICATIONS       Current Outpatient Medications   Medication Sig Dispense Refill    sulfamethoxazole-trimethoprim (BACTRIM DS;SEPTRA DS) 800-160 MG per tablet Take 1 tablet by mouth 2 times daily for 7 days 14 tablet 0    mupirocin (BACTROBAN) 2 % ointment Apply 3 times daily. 30 g 1    pantoprazole (PROTONIX) 20 MG tablet Take 1 tablet by mouth every morning (before breakfast) 90 tablet 1    atorvastatin (LIPITOR) 80 MG tablet Take 1 tablet by mouth daily 90 tablet 1    aspirin EC 81 MG EC tablet Take 1 tablet by mouth daily 90 tablet 1    gabapentin (NEURONTIN) 300 MG capsule Take 1 capsule by mouth 3 times daily for 180 days.  Intended supply: 90 days 270 capsule 1    fluticasone (FLONASE) 50 MCG/ACT nasal spray 1 spray by Nasal route nightly      acetaminophen (TYLENOL) 500 MG tablet Take 800 mg by mouth every 6 hours as needed      ibuprofen (ADVIL;MOTRIN) 800 MG tablet Take 1 tablet by mouth 2 times daily as needed for Pain 60 tablet 0    B Complex-C (SUPER B COMPLEX PO) Take by mouth      tamsulosin (FLOMAX) 0.4 MG capsule Take 1 capsule by mouth daily 90 capsule 3    carvedilol (COREG) 6.25 MG tablet Take 1 tablet by mouth daily 90 tablet 1    nitroGLYCERIN (NITROSTAT) 0.3 MG SL tablet Place 1 tablet under the tongue every 5 minutes as needed for Chest pain up to max of 3 total doses. If no relief after 1 dose, call 911. 30 tablet 3    Turmeric 500 MG CAPS Take 1,000 mg by mouth daily      omeprazole (PRILOSEC) 20 MG delayed release capsule Take 2 capsules by mouth Daily 180 capsule 3    tiZANidine (ZANAFLEX) 4 MG tablet Take 4 mg by mouth every 8 hours as needed      Coenzyme Q10 (COQ10) 100 MG CAPS Take by mouth daily       vitamin C (ASCORBIC ACID) 500 MG tablet Take 500 mg by mouth daily      magnesium oxide (MAG-OX) 400 MG tablet Take 400 mg by mouth every other day       Misc Natural Products (URINOZINC) CAPS Take by mouth      Omega-3 Fatty Acids (OMEGA 3 PO) Take by mouth      Saccharomyces boulardii (PROBIOTIC) 250 MG CAPS Take 1 capsule by mouth daily      cetirizine (ZYRTEC) 5 MG tablet Take 10 mg by mouth daily      FIBER PO Take 3 tablets by mouth daily       Garlic 7131 MG CAPS Take 1 capsule by mouth 3 times daily (with meals)        No current facility-administered medications for this visit. ALLERGIES     Penicillins    FAMILY HISTORY           Problem Relation Age of Onset    Hypertension Mother             Cancer Father              Family Status   Relation Name Status    MGM      MGF      [facility]      PGF      Mother      Father      Sister  Alive    Brother  Alive    Brother            SOCIAL HISTORY      reports that he quit smoking about 50 years ago. His smoking use included cigarettes. He started smoking about 51 years ago. He has a 0.30 pack-year smoking history. He has never used smokeless tobacco. He reports current alcohol use of about 14.0 standard drinks of alcohol per week. He reports that he does not use drugs.       PHYSICAL EXAM    (up to 7 for level 4, 8 or more for level 5)     Vitals:    10/09/21 1423   BP: 134/86   Pulse: 65   Temp: 99.9 °F (37.7 °C)   SpO2: 97%   Weight: 212 lb (96.2 kg)         Physical Exam  Vitals and nursing note reviewed. Constitutional:       General: He is not in acute distress. Appearance: Normal appearance. He is not ill-appearing. HENT:      Head: Normocephalic and atraumatic. Right Ear: External ear normal.      Left Ear: External ear normal.      Nose: Congestion present. Mouth/Throat:      Mouth: Mucous membranes are moist.   Eyes:      Extraocular Movements: Extraocular movements intact. Conjunctiva/sclera: Conjunctivae normal.      Pupils: Pupils are equal, round, and reactive to light. Cardiovascular:      Rate and Rhythm: Normal rate. Pulmonary:      Effort: Pulmonary effort is normal.   Abdominal:      Palpations: Abdomen is soft. Skin:     General: Skin is warm and dry. Neurological:      Mental Status: He is alert. DIFFERENTIAL DIAGNOSIS:       Nataly Reid reviewed the disposition diagnosis with the patient and or their family/guardian. I have answered their questions and given discharge instructions. They voiced understanding of these instructions and did not have anyfurther questions or complaints. PROCEDURES:  Orders Placed This Encounter   Procedures    COVID-19     Scheduling Instructions:      1) Due to current limited availability of the COVID-19 test, tests will be prioritized based on responses to questions above. Testing may be delayed due to volume. 2) Print and instruct patient to adhere to [facility] home isolation program. (Link Above)              3) Set up or refer patient for a monitoring program.              4) Have patient sign up for and leverage MyChart (if not previously done). Order Specific Question:   Is this test for diagnosis or screening?      Answer:   Diagnosis of ill patient     Order Specific Question:   Symptomatic for COVID-19 as defined by CDC? Answer:   Yes     Order Specific Question:   Date of Symptom Onset     Answer:   10/9/2021     Order Specific Question:   Hospitalized for COVID-19? Answer:   No     Order Specific Question:   Admitted to ICU for COVID-19? Answer:   No     Order Specific Question:   Employed in healthcare setting? Answer:   No     Order Specific Question:   Resident in a congregate (group) care setting? Answer:   No     Order Specific Question:   Pregnant: Answer:   No     Order Specific Question:   Previously tested for COVID-19? Answer:   No       No results found for this visit on 10/09/21. FINALIMPRESSION      Visit Diagnoses and Associated Orders     Nonintractable headache, unspecified chronicity pattern, unspecified headache type    -  Primary    COVID-19 [[id number] Custom]           Acute sinusitis, recurrence not specified, unspecified location             ORDERS WITHOUT AN ASSOCIATED DIAGNOSIS    ketorolac (TORADOL) injection 60 mg [[id number]]              PLAN     Return in about 2 days (around 10/11/2021) for follow up with Dr. Albaro Stevenson. DISCHARGEMEDICATIONS:  Orders Placed This Encounter   Medications    ketorolac (TORADOL) injection 60 mg         Plan:  Specimen sent for a culture. Possible treatment alteration based on the results. Toradol IM administered in the office. Patient tolerated well. Encouraged adequate hydration and rest.  Recommended keeping a headache diary. OTC Coricidin for sinus symptoms  Patient agreeable to treatment plan. Educational materials provided on AVS.  Follow up/go to the ER if symptoms do not improve/worsen. Patient instructed to return to the office if symptoms worsen, return, or have any other concerns. Patient understands and is agreeable.          Salvador Bush PA-C 10/9/2021 3:39 PM

## 2021-10-10 LAB
SARS-COV-2: NORMAL
SARS-COV-2: NOT DETECTED
SOURCE: NORMAL

## 2021-10-11 ENCOUNTER — PATIENT MESSAGE (OUTPATIENT)
Dept: PRIMARY CARE CLINIC | Age: 71
End: 2021-10-11

## 2021-10-11 ENCOUNTER — OFFICE VISIT (OUTPATIENT)
Dept: FAMILY MEDICINE CLINIC | Age: 71
End: 2021-10-11
Payer: MEDICARE

## 2021-10-11 VITALS
TEMPERATURE: 97.7 F | WEIGHT: 211.6 LBS | SYSTOLIC BLOOD PRESSURE: 136 MMHG | HEIGHT: 72 IN | DIASTOLIC BLOOD PRESSURE: 72 MMHG | HEART RATE: 76 BPM | OXYGEN SATURATION: 97 % | BODY MASS INDEX: 28.66 KG/M2 | RESPIRATION RATE: 12 BRPM

## 2021-10-11 DIAGNOSIS — L02.31 CELLULITIS AND ABSCESS OF BUTTOCK: Primary | ICD-10-CM

## 2021-10-11 DIAGNOSIS — L03.317 CELLULITIS AND ABSCESS OF BUTTOCK: Primary | ICD-10-CM

## 2021-10-11 DIAGNOSIS — R07.9 CHEST PAIN, UNSPECIFIED TYPE: ICD-10-CM

## 2021-10-11 PROCEDURE — 99213 OFFICE O/P EST LOW 20 MIN: CPT | Performed by: STUDENT IN AN ORGANIZED HEALTH CARE EDUCATION/TRAINING PROGRAM

## 2021-10-11 RX ORDER — CEPHALEXIN 500 MG/1
500 CAPSULE ORAL
COMMUNITY
Start: 2021-10-05 | End: 2021-10-16

## 2021-10-11 RX ORDER — HYDROCODONE BITARTRATE AND ACETAMINOPHEN 5; 325 MG/1; MG/1
TABLET ORAL
COMMUNITY
Start: 2021-10-05 | End: 2021-10-11

## 2021-10-11 RX ORDER — CARVEDILOL 6.25 MG/1
6.25 TABLET ORAL DAILY
Qty: 90 TABLET | Refills: 1 | Status: SHIPPED | OUTPATIENT
Start: 2021-10-11 | End: 2022-03-25 | Stop reason: SDUPTHER

## 2021-10-11 RX ORDER — NITROGLYCERIN 0.3 MG/1
0.3 TABLET SUBLINGUAL EVERY 5 MIN PRN
Qty: 30 TABLET | Refills: 3 | Status: SHIPPED | OUTPATIENT
Start: 2021-10-11

## 2021-10-11 ASSESSMENT — ENCOUNTER SYMPTOMS
SHORTNESS OF BREATH: 0
DIARRHEA: 0
SORE THROAT: 0
ABDOMINAL PAIN: 0
CHEST TIGHTNESS: 0
WHEEZING: 0
ABDOMINAL DISTENTION: 0
CONSTIPATION: 0
BACK PAIN: 0
COUGH: 0

## 2021-10-11 NOTE — PROGRESS NOTES
Mireille Lawrence (:  1950) is a 70 y.o. male,Established patient, here for evaluation of the following chief complaint(s):  No chief complaint on file. ASSESSMENT/PLAN:  1. Cellulitis and abscess of buttock    I&D at [facility]   No need to take rest of abx  Was seen in walk-in for headache? Fully vaccinated - feeling better afebrile. Retaped the area  There is a clear, open wound, that is non-draining  Last day of bactrim today      No follow-ups on file. Subjective   SUBJECTIVE/OBJECTIVE:  HPI: 70 M hx of recent buttocks abscess    Review of Systems   Constitutional: Negative for chills, fatigue and fever. HENT: Negative for congestion, postnasal drip and sore throat. Eyes: Negative for visual disturbance. Respiratory: Negative for cough, chest tightness, shortness of breath and wheezing. Cardiovascular: Negative. Gastrointestinal: Negative for abdominal distention, abdominal pain, constipation and diarrhea. Genitourinary: Negative for difficulty urinating, dysuria, frequency and urgency. Musculoskeletal: Negative for arthralgias, back pain and joint swelling. Skin: Negative for rash. Neurological: Negative for dizziness, weakness and light-headedness. Psychiatric/Behavioral: Negative for agitation, decreased concentration and sleep disturbance. Objective   Physical Exam  Vitals and nursing note reviewed. Constitutional:       Appearance: Normal appearance. HENT:      Head: Normocephalic and atraumatic. Eyes:      Extraocular Movements: Extraocular movements intact. Cardiovascular:      Rate and Rhythm: Normal rate and regular rhythm. Pulses: Normal pulses. Heart sounds: Normal heart sounds. Pulmonary:      Effort: Pulmonary effort is normal.      Breath sounds: Normal breath sounds. Abdominal:      General: Abdomen is flat. Palpations: Abdomen is soft. Musculoskeletal:         General: Normal range of motion.       Cervical back: Normal range of motion and neck supple. Skin:     General: Skin is warm. Comments: 2 cm lanceted wound - no drainage, evidence of healing cellulitic process   Neurological:      General: No focal deficit present. Mental Status: He is alert and oriented to person, place, and time. An electronic signature was used to authenticate this note.     --Marie Woo MD

## 2021-10-18 ENCOUNTER — OFFICE VISIT (OUTPATIENT)
Dept: FAMILY MEDICINE CLINIC | Age: 71
End: 2021-10-18
Payer: MEDICARE

## 2021-10-18 VITALS
HEART RATE: 71 BPM | OXYGEN SATURATION: 97 % | HEIGHT: 72 IN | SYSTOLIC BLOOD PRESSURE: 115 MMHG | WEIGHT: 211 LBS | DIASTOLIC BLOOD PRESSURE: 63 MMHG | BODY MASS INDEX: 28.58 KG/M2

## 2021-10-18 DIAGNOSIS — L02.31 CELLULITIS AND ABSCESS OF BUTTOCK: Primary | ICD-10-CM

## 2021-10-18 DIAGNOSIS — L03.317 CELLULITIS AND ABSCESS OF BUTTOCK: Primary | ICD-10-CM

## 2021-10-18 PROCEDURE — 99213 OFFICE O/P EST LOW 20 MIN: CPT | Performed by: STUDENT IN AN ORGANIZED HEALTH CARE EDUCATION/TRAINING PROGRAM

## 2021-10-18 RX ORDER — HYDROPHILIC CREAM
1 PASTE (GRAM) TOPICAL DAILY
Qty: 1 EACH | Refills: 3 | Status: SHIPPED | OUTPATIENT
Start: 2021-10-18 | End: 2022-09-13

## 2021-10-18 NOTE — PROGRESS NOTES
Ruthann Arenas (:  1950) is a 70 y.o. male,Established patient, here for evaluation of the following chief complaint(s): Other (Abcess)         ASSESSMENT/PLAN:  1. Cellulitis and abscess of buttock  -     zinc oxide (TRIAD HYDROPHILIC) PSTE paste; Apply 1 applicator topically daily, Disp-1 each, R-3Normal    Closing well  No need to suture  Triad wound paste  Keep applying bandages to wound  If fevers, chills, worsening pain please contact us immediately  Always a risk for re-infection especially given location of infection site      No follow-ups on file. Subjective   SUBJECTIVE/OBJECTIVE:  HPI: 70 M hx of cellulitis of buttocks drained at [facility] [place]    Presents again to see if wound healing well    No signs of active infection  Denies fevers, chills, nightsweats  No pain  Feels it's healing well  Cont to drain slightly    Review of Systems 12 pt ROS per HPI       Objective   Physical Exam buttocks wound is open, draining slightly  Pt is awake alert  ncat eomi wears glasses  Normal respiratory effort  abd obese  Normal gait  Normal affect and mood           An electronic signature was used to authenticate this note.     --Aiden Alonzo MD

## 2021-11-12 DIAGNOSIS — M62.838 MUSCLE SPASM: ICD-10-CM

## 2021-11-12 NOTE — TELEPHONE ENCOUNTER
Last visit: 10/18/21  Last Med refill: 9/10/21    Next Visit Date:  No future appointments.     Health Maintenance   Topic Date Due    COVID-19 Vaccine (3 - Booster for Pfizer series) 09/04/2021    Lipid screen  06/01/2022    Annual Wellness Visit (AWV)  07/23/2022    Colon cancer screen colonoscopy  03/22/2027    DTaP/Tdap/Td vaccine (3 - Td or Tdap) 11/21/2029    Flu vaccine  Completed    Shingles Vaccine  Completed    Pneumococcal 65+ years Vaccine  Completed    AAA screen  Completed    Hepatitis C screen  Completed    Hepatitis A vaccine  Aged Out    Hepatitis B vaccine  Aged Out    Hib vaccine  Aged Out    Meningococcal (ACWY) vaccine  Aged Out       No results found for: LABA1C          ( goal A1C is < 7)   No results found for: LABMICR  LDL Cholesterol (mg/dL)   Date Value   06/01/2021 113   05/22/2020 103     LDL Calculated (mg/dL)   Date Value   05/15/2014 115       (goal LDL is <100)   AST (U/L)   Date Value   06/01/2021 35     ALT (U/L)   Date Value   06/01/2021 31     BUN (mg/dL)   Date Value   06/01/2021 13     BP Readings from Last 3 Encounters:   10/18/21 115/63   10/11/21 136/72   10/09/21 134/86          (goal 120/80)    All Future Testing planned in [facility]  Lab Frequency Next Occurrence               Patient Active Problem List:     GERD (gastroesophageal reflux disease)     Hyperlipidemia     External hemorrhoids     Other male erectile dysfunction     Nocturia     Arthropathy     Benign prostatic hyperplasia with urinary obstruction     Allergic rhinitis     Rotator cuff arthropathy     Spinal stenosis of lumbar region without neurogenic claudication     Lumbar spondylosis     Rotator cuff tear arthropathy     Herniated lumbar intervertebral disc     DDD (degenerative disc disease), cervical     Biceps tendinitis     Nontraumatic tear of left rotator cuff     History of lumbar fusion     Retrolisthesis of vertebrae     Neck pain

## 2021-11-15 RX ORDER — IBUPROFEN 800 MG/1
TABLET ORAL
Qty: 180 TABLET | Refills: 1 | Status: SHIPPED | OUTPATIENT
Start: 2021-11-15

## 2022-01-20 ENCOUNTER — NURSE TRIAGE (OUTPATIENT)
Dept: OTHER | Facility: CLINIC | Age: 72
End: 2022-01-20

## 2022-02-08 NOTE — TELEPHONE ENCOUNTER
Last visit: 10/18/21  Last Med refill: n/a  Does patient have enough medication for 72 hours: Yes    Next Visit Date:  No future appointments.     Health Maintenance   Topic Date Due    COVID-19 Vaccine (3 - Booster for Juaquin Ayala series) 08/04/2021    Lipid screen  06/01/2022    Annual Wellness Visit (AWV)  07/23/2022    Depression Screen  09/23/2022    Colon cancer screen colonoscopy  03/22/2027    DTaP/Tdap/Td vaccine (3 - Td or Tdap) 11/21/2029    Flu vaccine  Completed    Shingles Vaccine  Completed    Pneumococcal 65+ years Vaccine  Completed    AAA screen  Completed    Hepatitis C screen  Completed    Hepatitis A vaccine  Aged Out    Hepatitis B vaccine  Aged Out    Hib vaccine  Aged Out    Meningococcal (ACWY) vaccine  Aged Out       No results found for: LABA1C          ( goal A1C is < 7)   No results found for: LABMICR  LDL Cholesterol (mg/dL)   Date Value   06/01/2021 113   05/22/2020 103     LDL Calculated (mg/dL)   Date Value   05/15/2014 115       (goal LDL is <100)   AST (U/L)   Date Value   06/01/2021 35     ALT (U/L)   Date Value   06/01/2021 31     BUN (mg/dL)   Date Value   06/01/2021 13     BP Readings from Last 3 Encounters:   10/18/21 115/63   10/11/21 136/72   10/09/21 134/86          (goal 120/80)    All Future Testing planned in [facility]  Lab Frequency Next Occurrence               Patient Active Problem List:     GERD (gastroesophageal reflux disease)     Hyperlipidemia     External hemorrhoids     Other male erectile dysfunction     Nocturia     Arthropathy     Benign prostatic hyperplasia with urinary obstruction     Allergic rhinitis     Rotator cuff arthropathy     Spinal stenosis of lumbar region without neurogenic claudication     Lumbar spondylosis     Rotator cuff tear arthropathy     Herniated lumbar intervertebral disc     DDD (degenerative disc disease), cervical     Biceps tendinitis     Nontraumatic tear of left rotator cuff     History of lumbar fusion Retrolisthesis of vertebrae     Neck pain

## 2022-02-09 RX ORDER — TIZANIDINE 4 MG/1
4 TABLET ORAL EVERY 8 HOURS PRN
Qty: 30 TABLET | Refills: 0 | Status: SHIPPED | OUTPATIENT
Start: 2022-02-09 | End: 2022-03-11

## 2022-03-15 ENCOUNTER — OFFICE VISIT (OUTPATIENT)
Dept: FAMILY MEDICINE CLINIC | Age: 72
End: 2022-03-15
Payer: MEDICARE

## 2022-03-15 VITALS
SYSTOLIC BLOOD PRESSURE: 130 MMHG | BODY MASS INDEX: 30.2 KG/M2 | WEIGHT: 223 LBS | HEIGHT: 72 IN | DIASTOLIC BLOOD PRESSURE: 64 MMHG

## 2022-03-15 DIAGNOSIS — K21.00 GASTROESOPHAGEAL REFLUX DISEASE WITH ESOPHAGITIS WITHOUT HEMORRHAGE: Primary | ICD-10-CM

## 2022-03-15 DIAGNOSIS — M62.838 MUSCLE SPASM: ICD-10-CM

## 2022-03-15 DIAGNOSIS — I25.10 CORONARY ARTERY DISEASE INVOLVING NATIVE CORONARY ARTERY OF NATIVE HEART WITHOUT ANGINA PECTORIS: ICD-10-CM

## 2022-03-15 DIAGNOSIS — K44.9 HIATAL HERNIA: ICD-10-CM

## 2022-03-15 PROCEDURE — 99214 OFFICE O/P EST MOD 30 MIN: CPT | Performed by: STUDENT IN AN ORGANIZED HEALTH CARE EDUCATION/TRAINING PROGRAM

## 2022-03-15 RX ORDER — ATORVASTATIN CALCIUM 40 MG/1
40 TABLET, FILM COATED ORAL DAILY
Qty: 90 TABLET | Refills: 1 | Status: SHIPPED | OUTPATIENT
Start: 2022-03-15 | End: 2022-03-24 | Stop reason: SDUPTHER

## 2022-03-15 RX ORDER — TIZANIDINE 4 MG/1
4 TABLET ORAL EVERY 6 HOURS PRN
COMMUNITY
End: 2022-03-15 | Stop reason: SDUPTHER

## 2022-03-15 RX ORDER — ASPIRIN 81 MG/1
81 TABLET ORAL DAILY
Qty: 90 TABLET | Refills: 1 | Status: SHIPPED | OUTPATIENT
Start: 2022-03-15

## 2022-03-15 RX ORDER — TIZANIDINE 4 MG/1
4 TABLET ORAL EVERY EVENING
Qty: 90 TABLET | Refills: 1 | Status: SHIPPED | OUTPATIENT
Start: 2022-03-15 | End: 2022-03-24 | Stop reason: SDUPTHER

## 2022-03-15 RX ORDER — ALUMINUM HYDROXIDE AND MAGNESIUM CARBONATE 254; 237.5 MG/5ML; MG/5ML
10 LIQUID ORAL 2 TIMES DAILY PRN
Qty: 355 ML | Refills: 1 | Status: SHIPPED | OUTPATIENT
Start: 2022-03-15

## 2022-03-15 NOTE — PROGRESS NOTES
General: Normal range of motion. Cervical back: Normal range of motion and neck supple. Skin:     General: Skin is warm. Neurological:      General: No focal deficit present. Mental Status: He is alert and oriented to person, place, and time. An electronic signature was used to authenticate this note.     --Ángela Portillo MD

## 2022-03-16 DIAGNOSIS — M62.838 MUSCLE SPASM: ICD-10-CM

## 2022-03-16 DIAGNOSIS — I25.10 CORONARY ARTERY DISEASE INVOLVING NATIVE CORONARY ARTERY OF NATIVE HEART WITHOUT ANGINA PECTORIS: ICD-10-CM

## 2022-03-16 DIAGNOSIS — K21.00 GASTROESOPHAGEAL REFLUX DISEASE WITH ESOPHAGITIS WITHOUT HEMORRHAGE: ICD-10-CM

## 2022-03-16 RX ORDER — TIZANIDINE 4 MG/1
4 TABLET ORAL EVERY EVENING
Qty: 90 TABLET | Refills: 1 | Status: CANCELLED | OUTPATIENT
Start: 2022-03-16

## 2022-03-16 RX ORDER — ATORVASTATIN CALCIUM 40 MG/1
40 TABLET, FILM COATED ORAL DAILY
Qty: 90 TABLET | Refills: 1 | Status: CANCELLED | OUTPATIENT
Start: 2022-03-16

## 2022-03-16 NOTE — TELEPHONE ENCOUNTER
----- Message from Edy Retana sent at 3/16/2022 10:36 AM EDT -----  Subject: Message to Provider    QUESTIONS  Information for Provider? pt would like a call back from office in regards   [facility]   [facility]   ---------------------------------------------------------------------------  --------------  [address]  What is the best way for the office to contact you? OK to leave message on   voicemail  Preferred Call Back Phone Number? [phone]  ---------------------------------------------------------------------------  --------------  SCRIPT ANSWERS  Relationship to Patient?  Self

## 2022-03-16 NOTE — TELEPHONE ENCOUNTER
Last visit: 3/15/22  Last Med refill: 3/15/22  Does patient have enough medication for 72 hours: No: went to wrong pharmacy    Next Visit Date:  Future Appointments   Date Time Provider Roldan Shaikh   7/25/2022 10:15 AM MD Vamsi Shore [facility] [address] Maintenance   Topic Date Due    COVID-19 Vaccine (3 - Booster for Reza Peter series) 03/13/2023 (Originally 8/4/2021)    Lipid screen  06/01/2022    Annual Wellness Visit (AWV)  07/23/2022    Depression Screen  09/23/2022    Colorectal Cancer Screen  03/22/2027    DTaP/Tdap/Td vaccine (3 - Td or Tdap) 11/21/2029    Flu vaccine  Completed    Shingles Vaccine  Completed    Pneumococcal 65+ years Vaccine  Completed    AAA screen  Completed    Hepatitis C screen  Completed    Hepatitis A vaccine  Aged Out    Hepatitis B vaccine  Aged Out    Hib vaccine  Aged Out    Meningococcal (ACWY) vaccine  Aged Out       No results found for: LABA1C          ( goal A1C is < 7)   No results found for: LABMICR  LDL Cholesterol (mg/dL)   Date Value   06/01/2021 113   05/22/2020 103     LDL Calculated (mg/dL)   Date Value   05/15/2014 115       (goal LDL is <100)   AST (U/L)   Date Value   06/01/2021 35     ALT (U/L)   Date Value   06/01/2021 31     BUN (mg/dL)   Date Value   06/01/2021 13     BP Readings from Last 3 Encounters:   03/15/22 130/64   10/18/21 115/63   10/11/21 136/72          (goal 120/80)    All Future Testing planned in [facility]  Lab Frequency Next Occurrence               Patient Active Problem List:     GERD (gastroesophageal reflux disease)     Hyperlipidemia     External hemorrhoids     Other male erectile dysfunction     Nocturia     Arthropathy     Benign prostatic hyperplasia with urinary obstruction     Allergic rhinitis     Rotator cuff arthropathy     Spinal stenosis of lumbar region without neurogenic claudication     Lumbar spondylosis     Rotator cuff tear arthropathy     Herniated lumbar intervertebral disc     DDD (degenerative disc disease), cervical     Biceps tendinitis     Nontraumatic tear of left rotator cuff     History of lumbar fusion     Retrolisthesis of vertebrae     Neck pain

## 2022-03-21 ENCOUNTER — TELEPHONE (OUTPATIENT)
Dept: FAMILY MEDICINE CLINIC | Age: 72
End: 2022-03-21

## 2022-03-21 DIAGNOSIS — M62.838 MUSCLE SPASM: ICD-10-CM

## 2022-03-21 DIAGNOSIS — K21.00 GASTROESOPHAGEAL REFLUX DISEASE WITH ESOPHAGITIS WITHOUT HEMORRHAGE: ICD-10-CM

## 2022-03-21 NOTE — TELEPHONE ENCOUNTER
Patient called into the office and stated that tizanidine and atorvastatin refills went to [facility] He would like this taken care of ASAP.

## 2022-03-24 RX ORDER — TIZANIDINE 4 MG/1
4 TABLET ORAL EVERY EVENING
Qty: 90 TABLET | Refills: 1 | Status: SHIPPED | OUTPATIENT
Start: 2022-03-24 | End: 2022-09-13

## 2022-03-24 RX ORDER — ATORVASTATIN CALCIUM 40 MG/1
40 TABLET, FILM COATED ORAL DAILY
Qty: 90 TABLET | Refills: 1 | Status: SHIPPED | OUTPATIENT
Start: 2022-03-24

## 2022-03-25 DIAGNOSIS — R07.9 CHEST PAIN, UNSPECIFIED TYPE: ICD-10-CM

## 2022-03-25 RX ORDER — CARVEDILOL 6.25 MG/1
6.25 TABLET ORAL DAILY
Qty: 90 TABLET | Refills: 1 | Status: SHIPPED | OUTPATIENT
Start: 2022-03-25 | End: 2022-10-21

## 2022-03-25 NOTE — TELEPHONE ENCOUNTER
Last visit: 3/15/22  Last Med refill: 10/11/21  Does patient have enough medication for 72 hours: Yes    Next Visit Date:  Future Appointments   Date Time Provider Roldan Shaikh   7/25/2022  9:45 AM MD Ryan Chase [facility] [address] Maintenance   Topic Date Due    COVID-19 Vaccine (3 - Booster for Reza Peter series) 03/13/2023 (Originally 8/4/2021)    Lipid screen  06/01/2022    Annual Wellness Visit (AWV)  07/23/2022    Depression Screen  09/23/2022    Colorectal Cancer Screen  03/22/2027    DTaP/Tdap/Td vaccine (3 - Td or Tdap) 11/21/2029    Flu vaccine  Completed    Shingles Vaccine  Completed    Pneumococcal 65+ years Vaccine  Completed    AAA screen  Completed    Hepatitis C screen  Completed    Hepatitis A vaccine  Aged Out    Hepatitis B vaccine  Aged Out    Hib vaccine  Aged Out    Meningococcal (ACWY) vaccine  Aged Out       No results found for: LABA1C          ( goal A1C is < 7)   No results found for: LABMICR  LDL Cholesterol (mg/dL)   Date Value   06/01/2021 113   05/22/2020 103     LDL Calculated (mg/dL)   Date Value   05/15/2014 115       (goal LDL is <100)   AST (U/L)   Date Value   06/01/2021 35     ALT (U/L)   Date Value   06/01/2021 31     BUN (mg/dL)   Date Value   06/01/2021 13     BP Readings from Last 3 Encounters:   03/15/22 130/64   10/18/21 115/63   10/11/21 136/72          (goal 120/80)    All Future Testing planned in [facility]  Lab Frequency Next Occurrence               Patient Active Problem List:     GERD (gastroesophageal reflux disease)     Hyperlipidemia     External hemorrhoids     Other male erectile dysfunction     Nocturia     Arthropathy     Benign prostatic hyperplasia with urinary obstruction     Allergic rhinitis     Rotator cuff arthropathy     Spinal stenosis of lumbar region without neurogenic claudication     Lumbar spondylosis     Rotator cuff tear arthropathy     Herniated lumbar intervertebral disc     DDD (degenerative disc disease), cervical     Biceps tendinitis     Nontraumatic tear of left rotator cuff     History of lumbar fusion     Retrolisthesis of vertebrae     Neck pain

## 2022-03-28 DIAGNOSIS — I25.10 CORONARY ARTERY DISEASE INVOLVING NATIVE CORONARY ARTERY OF NATIVE HEART WITHOUT ANGINA PECTORIS: ICD-10-CM

## 2022-03-30 RX ORDER — ASPIRIN 81 MG/1
81 TABLET ORAL DAILY
Qty: 90 TABLET | Refills: 1 | Status: SHIPPED | OUTPATIENT
Start: 2022-03-30 | End: 2022-07-26

## 2022-04-06 RX ORDER — ALUMINUM HYDROXIDE AND MAGNESIUM CARBONATE 254; 237.5 MG/5ML; MG/5ML
10 LIQUID ORAL 2 TIMES DAILY PRN
Qty: 355 ML | Refills: 1 | OUTPATIENT
Start: 2022-04-06

## 2022-04-06 RX ORDER — ASPIRIN 81 MG/1
81 TABLET ORAL DAILY
Qty: 90 TABLET | Refills: 1 | OUTPATIENT
Start: 2022-04-06

## 2022-04-23 ENCOUNTER — OFFICE VISIT (OUTPATIENT)
Dept: PRIMARY CARE CLINIC | Age: 72
End: 2022-04-23
Payer: MEDICARE

## 2022-04-23 VITALS
DIASTOLIC BLOOD PRESSURE: 70 MMHG | BODY MASS INDEX: 30.24 KG/M2 | SYSTOLIC BLOOD PRESSURE: 138 MMHG | WEIGHT: 223 LBS | HEART RATE: 85 BPM | OXYGEN SATURATION: 97 % | TEMPERATURE: 100.3 F

## 2022-04-23 DIAGNOSIS — R09.81 SINUS CONGESTION: ICD-10-CM

## 2022-04-23 DIAGNOSIS — H69.83 EUSTACHIAN TUBE DYSFUNCTION, BILATERAL: ICD-10-CM

## 2022-04-23 DIAGNOSIS — H65.03 NON-RECURRENT ACUTE SEROUS OTITIS MEDIA OF BOTH EARS: Primary | ICD-10-CM

## 2022-04-23 LAB
INFLUENZA A ANTIBODY: NORMAL
INFLUENZA B ANTIBODY: NORMAL

## 2022-04-23 PROCEDURE — 99213 OFFICE O/P EST LOW 20 MIN: CPT

## 2022-04-23 PROCEDURE — 87804 INFLUENZA ASSAY W/OPTIC: CPT

## 2022-04-23 RX ORDER — FLUTICASONE PROPIONATE 50 MCG
2 SPRAY, SUSPENSION (ML) NASAL DAILY
Qty: 16 G | Refills: 0 | Status: SHIPPED | OUTPATIENT
Start: 2022-04-23 | End: 2022-09-13

## 2022-04-23 RX ORDER — CEFDINIR 300 MG/1
300 CAPSULE ORAL 2 TIMES DAILY
Qty: 14 CAPSULE | Refills: 0 | Status: SHIPPED | OUTPATIENT
Start: 2022-04-23 | End: 2022-04-30

## 2022-04-23 ASSESSMENT — ENCOUNTER SYMPTOMS
WHEEZING: 0
COUGH: 1
SHORTNESS OF BREATH: 0
SINUS PRESSURE: 1
RHINORRHEA: 1
SORE THROAT: 1

## 2022-04-23 NOTE — PROGRESS NOTES
[facility] [place]  [address] [facility],[address]  [place] [id number]    144 [facility]. [place]  [address] [facility] [address]  Dept: 1700 Jennifer Presley is a 70 y.o. male Established patient, who presents to the walk-in clinic today with conditions/complaints as noted below:    Chief Complaint   Patient presents with    Nasal Congestion     onset wednesday; tried mucinex yesterday to no relief    Otalgia    Pharyngitis         HPI:     URI   This is a new problem. The current episode started in the past 7 days (on Wednesday). The problem has been gradually worsening. There has been no fever. Associated symptoms include congestion, coughing, ear pain, a plugged ear sensation, rhinorrhea and a sore throat. Pertinent negatives include no chest pain, headaches, rash or wheezing. Treatments tried: mucinex. The treatment provided no relief. Patient has received both doses of the Pfizer COVID-19 vaccine.     Past Medical History:   Diagnosis Date    Allergic rhinitis     Arthritis     BPH (benign prostatic hyperplasia)     Caffeine use     3 cups coffee/day    Chronic back pain     Diarrhea     ED (erectile dysfunction)     Esophageal stricture     GERD (gastroesophageal reflux disease)     Headache(784.0)     Hemorrhoids     Hyperlipemia     Rotator cuff tear, right 2012       Current Outpatient Medications   Medication Sig Dispense Refill    cefdinir (OMNICEF) 300 MG capsule Take 1 capsule by mouth 2 times daily for 7 days 14 capsule 0    fluticasone (FLONASE) 50 MCG/ACT nasal spray 2 sprays by Each Nostril route daily 16 g 0    aspirin EC 81 MG EC tablet Take 1 tablet by mouth daily 90 tablet 1    carvedilol (COREG) 6.25 MG tablet Take 1 tablet by mouth daily 90 tablet 1    tiZANidine (ZANAFLEX) 4 MG tablet Take 1 tablet by mouth every evening 90 tablet 1    atorvastatin (LIPITOR) 40 MG tablet Take 1 tablet by mouth daily 90 tablet 1    Alum Hydroxide-Mag Carbonate (GAVISCON EXTRA RELIEF FORMULA) 508-475 MG/10ML SUSP Take 10 mLs by mouth 2 times daily as needed (acid reflux) 355 mL 1    aspirin EC 81 MG EC tablet Take 1 tablet by mouth daily 90 tablet 1    ibuprofen (ADVIL;MOTRIN) 800 MG tablet TAKE 1 TABLET TWICE A DAY AS NEEDED FOR PAIN 180 tablet 1    zinc oxide (TRIAD HYDROPHILIC) PSTE paste Apply 1 applicator topically daily 1 each 3    nitroGLYCERIN (NITROSTAT) 0.3 MG SL tablet Place 1 tablet under the tongue every 5 minutes as needed for Chest pain up to max of 3 total doses. If no relief after 1 dose, call 911. 30 tablet 3    pantoprazole (PROTONIX) 20 MG tablet Take 1 tablet by mouth every morning (before breakfast) 90 tablet 1    gabapentin (NEURONTIN) 300 MG capsule Take 1 capsule by mouth 3 times daily for 180 days. Intended supply: 90 days 270 capsule 1    acetaminophen (TYLENOL) 500 MG tablet Take 800 mg by mouth every 6 hours as needed       B Complex-C (SUPER B COMPLEX PO) Take by mouth      tamsulosin (FLOMAX) 0.4 MG capsule Take 1 capsule by mouth daily 90 capsule 3    Turmeric 500 MG CAPS Take 1,000 mg by mouth daily      Coenzyme Q10 (COQ10) 100 MG CAPS Take by mouth daily       vitamin C (ASCORBIC ACID) 500 MG tablet Take 500 mg by mouth daily      magnesium oxide (MAG-OX) 400 MG tablet Take 400 mg by mouth every other day       Misc Natural Products (URINOZINC) CAPS Take by mouth      Omega-3 Fatty Acids (OMEGA 3 PO) Take by mouth      Saccharomyces boulardii (PROBIOTIC) 250 MG CAPS Take 1 capsule by mouth daily      cetirizine (ZYRTEC) 5 MG tablet Take 10 mg by mouth daily      FIBER PO Take 3 tablets by mouth daily       Garlic 7155 MG CAPS Take 1 capsule by mouth 3 times daily (with meals)        No current facility-administered medications for this visit.        Allergies   Allergen Reactions    Penicillins Other (See Comments)     Other reaction(s): Rash  Pt does not recall the reaction        :     Review of Systems   Constitutional: Negative for chills and fever. HENT: Positive for congestion, ear pain, postnasal drip, rhinorrhea, sinus pressure and sore throat. Eyes: Negative for visual disturbance. Respiratory: Positive for cough. Negative for shortness of breath and wheezing. Cardiovascular: Negative for chest pain. Skin: Negative for rash. Neurological: Negative for weakness and headaches.       :     /70 (Site: Right Upper Arm, Position: Sitting)   Pulse 85   Temp 100.3 °F (37.9 °C) (Tympanic)   Wt 223 lb (101.2 kg)   SpO2 97%   BMI 30.24 kg/m²     Physical Exam  Vitals reviewed. Constitutional:       General: He is not in acute distress. Appearance: Normal appearance. HENT:      Head: Normocephalic and atraumatic. Right Ear: Ear canal and external ear normal. Tympanic membrane is injected and bulging. Left Ear: Ear canal and external ear normal. Tympanic membrane is injected and bulging. Nose: Congestion present. No rhinorrhea. Mouth/Throat:      Mouth: Mucous membranes are moist.      Pharynx: Oropharynx is clear. Posterior oropharyngeal erythema present. Eyes:      Conjunctiva/sclera: Conjunctivae normal.      Pupils: Pupils are equal, round, and reactive to light. Cardiovascular:      Rate and Rhythm: Normal rate and regular rhythm. Heart sounds: Normal heart sounds. No murmur heard. Pulmonary:      Effort: Pulmonary effort is normal.      Breath sounds: Normal breath sounds. Musculoskeletal:         General: Normal range of motion. Cervical back: Neck supple. Lymphadenopathy:      Cervical: No cervical adenopathy. Skin:     General: Skin is warm and dry. Findings: No rash. Neurological:      Mental Status: He is alert and oriented to person, place, and time.    Psychiatric:         Mood and Affect: Mood normal.         Behavior: Behavior normal.           :          1. Non-recurrent acute serous otitis media of both ears  -     cefdinir (OMNICEF) 300 MG capsule; Take 1 capsule by mouth 2 times daily for 7 days, Disp-14 capsule, R-0Normal  2. Eustachian tube dysfunction, bilateral  -     fluticasone (FLONASE) 50 MCG/ACT nasal spray; 2 sprays by Each Nostril route daily, Disp-16 g, R-0Normal  3. Sinus congestion  -     POCT Influenza A/B       :      Return if symptoms worsen or fail to improve. Orders Placed This Encounter   Medications    cefdinir (OMNICEF) 300 MG capsule     Sig: Take 1 capsule by mouth 2 times daily for 7 days     Dispense:  14 capsule     Refill:  0    fluticasone (FLONASE) 50 MCG/ACT nasal spray     Si sprays by Each Nostril route daily     Dispense:  16 g     Refill:  0     Results for POC orders placed in visit on 22   POCT Influenza A/B   Result Value Ref Range    Influenza A Ab neg     Influenza B Ab neg      Rapid influenza A/B performed in office and results reviewed with the patient. Instructed to finish the entire course of antibiotic treatment. Continue with oral hydration and rest.  Recommend daily use of mucinex and nasal steroid spray. Use acetaminophen or ibuprofen as needed for fevers, sore throat, or ear pain. Follow-up with PCP as needed. Patient and/or parent given educational materials - see patient instructions. Discussed use, benefit, and side effects of prescribed medications. All patient questions answered. Patient and/or parent voiced understanding.       Electronically signed by DARIA Lake 2022 at 10:32 AM

## 2022-04-23 NOTE — PATIENT INSTRUCTIONS
Finish the entire course of antibiotic treatment. Continue with oral hydration and rest.  Recommend daily use of Mucinex and Flonase. Use Tylenol or Motrin as needed for headaches, sore throat, or discomfort. Follow-up with PCP as needed. Patient Education        Ear Infection (Otitis Media): Care Instructions  Overview     An ear infection may start with a cold and affect the middle ear (otitis media). It can hurt a lot. Most ear infections clear up on their own in a couple of days and do not need antibiotics. Also, antibiotics do not work against viruses, which may be the cause of your infection. Regular doses ofpain relievers are the best way to reduce your fever and help you feel better. Follow-up care is a key part of your treatment and safety. Be sure to make and go to all appointments, and call your doctor if you are having problems. It's also a good idea to know your test results and keep alist of the medicines you take. How can you care for yourself at home?  Take pain medicines exactly as directed. ? If the doctor gave you a prescription medicine for pain, take it as prescribed. ? If you are not taking a prescription pain medicine, take an over-the-counter medicine, such as acetaminophen (Tylenol), ibuprofen (Advil, Motrin), or naproxen (Aleve). Read and follow all instructions on the label. ? Do not take two or more pain medicines at the same time unless the doctor told you to. Many pain medicines have acetaminophen, which is Tylenol. Too much acetaminophen (Tylenol) can be harmful.  Plan to take a full dose of pain reliever before bedtime. Getting enough sleep will help you get better.  Try a warm, moist washcloth on the ear. It may help relieve pain.  If your doctor prescribed antibiotics, take them as directed. Do not stop taking them just because you feel better. You need to take the full course of antibiotics. When should you call for help?    Call your doctor now or seek immediate medical care if:     You have new or increasing ear pain.      You have new or increasing pus or blood draining from your ear.      You have a fever with a stiff neck or a severe headache. Watch closely for changes in your health, and be sure to contact your doctor if:     You have new or worse symptoms.      You are not getting better after taking an antibiotic for 2 days. Where can you learn more? Go to [email or web address] and sign in to your [employer] account. Enter S645 in the [employer] box to learn more about \"Ear Infection (Otitis Media): Care Instructions. \"     If you do not have an account, please click on the \"Sign Up Now\" link. Current as of: September 8, 2021               Content Version: 13.2  © 0282-0471 Healthwise, Incorporated. Care instructions adapted under license by [facility]. If you have questions about a medical condition or this instruction, always ask your healthcare professional. [place] [address] any warranty or liability for your use of this information.

## 2022-04-28 ENCOUNTER — OFFICE VISIT (OUTPATIENT)
Dept: PRIMARY CARE CLINIC | Age: 72
End: 2022-04-28
Payer: MEDICARE

## 2022-04-28 VITALS
DIASTOLIC BLOOD PRESSURE: 76 MMHG | BODY MASS INDEX: 30.24 KG/M2 | TEMPERATURE: 98.9 F | WEIGHT: 223 LBS | SYSTOLIC BLOOD PRESSURE: 130 MMHG | OXYGEN SATURATION: 97 % | HEART RATE: 83 BPM

## 2022-04-28 DIAGNOSIS — H92.09 OTALGIA, UNSPECIFIED LATERALITY: Primary | ICD-10-CM

## 2022-04-28 PROCEDURE — 99213 OFFICE O/P EST LOW 20 MIN: CPT | Performed by: FAMILY MEDICINE

## 2022-04-28 NOTE — PROGRESS NOTES
Subjective:  Michael Mike presents for   Chief Complaint   Patient presents with    Otalgia     Rt ear pain- rx given in clinic 4/23/22 for Bilat OM; pt states Rt ear hasnt improved.  Nasal Congestion    Cough     Compared to Saturday, he still feel pressure- it is not painful though. No ear discharge. All his other sx are better.(cough, nasal congestion etc)    Is tolerating the omnicef, will be done with it tomorrow. He did get a nose bleed from the flonase, so he stopped that    Patient Active Problem List   Diagnosis    GERD (gastroesophageal reflux disease)    Hyperlipidemia    External hemorrhoids    Other male erectile dysfunction    Nocturia    Arthropathy    Benign prostatic hyperplasia with urinary obstruction    Allergic rhinitis    Rotator cuff arthropathy    Spinal stenosis of lumbar region without neurogenic claudication    Lumbar spondylosis    Rotator cuff tear arthropathy    Herniated lumbar intervertebral disc    DDD (degenerative disc disease), cervical    Biceps tendinitis    Nontraumatic tear of left rotator cuff    History of lumbar fusion    Retrolisthesis of vertebrae    Neck pain       ·     Objective:  Physical Exam   Vitals: Wt Readings from Last 3 Encounters:   04/28/22 223 lb (101.2 kg)   04/23/22 223 lb (101.2 kg)   03/15/22 223 lb (101.2 kg)     Ht Readings from Last 3 Encounters:   03/15/22 6' (1.829 m)   10/18/21 6' (1.829 m)   10/11/21 6' (1.829 m)     Body mass index is 30.24 kg/m². Vitals:    04/28/22 1456   BP: 130/76   Site: Right Upper Arm   Pulse: 83   Temp: 98.9 °F (37.2 °C)   SpO2: 97%   Weight: 223 lb (101.2 kg)       Constitutional: He is oriented to person, place, and time. He appears well-developed and well-nourished and in no acute distress. Answers all my questions appropriately. Head: Normocephalic and atraumatic.      Eyes:conjunctiva appear normal.    Right Ear: External ear normal. TM is NOT distorted and only slightly irritated  Left Ear: External ear normal. TM is NOT distorted and only slightly irritated    Nose: pink, non-edematous mucosa. No polyps. No septal deviation    Throat: no erythema, tonsillar hypertrophy or exudate. No ulcerations noted. Lips/Teeth/Gums all appear normal.    Neck: Normal range of motion. Neck supple. No tracheal deviation present. No abnormal lymphadenopathy. No JVD noted. Carotids are clear bilaterally. No thyroid masses noted. Heart: RRR without murmur. No S3, S4, or gallop noted. Chest:   Good breath sounds noted. Clear to auscultation bilaterally. No rales, wheezes, or rhonchi noted. No respiratory retractions noted. Wall has symmetrical movement with respirations. Assessment:   Encounter Diagnosis   Name Primary?  Otalgia, unspecified laterality Yes         Plan:     Compared to the note from 4/23 his ears look much better. Explained to him that he is experiencing residual fluid, and hence pressure. This should continue to resolve in the next week. There is no need to extend antibiotics    Medications adjustments: stop the flonase, use nasal saline spray    Complicating concurrent chronic diagnosis: none    Differential diagnosis: n/a    Disease management: use  humidifier at home. Do NOT go scuba diving    There are no discontinued medications. THE ABOVE NOTED DISCONTINUED MEDS MAY ONLY BE FROM 'CLEANING UP' THE MED LIST AND WERE NOT ACTUALLY CANCELED;  SEE CHART FOR DETAILS! No orders of the defined types were placed in this encounter. No orders of the defined types were placed in this encounter. Return in about 2 weeks (around 5/12/2022). There are no Patient Instructions on file for this visit.   Follow up with your provider

## 2022-05-19 ENCOUNTER — OFFICE VISIT (OUTPATIENT)
Dept: PRIMARY CARE CLINIC | Age: 72
End: 2022-05-19
Payer: MEDICARE

## 2022-05-19 VITALS
OXYGEN SATURATION: 98 % | DIASTOLIC BLOOD PRESSURE: 84 MMHG | BODY MASS INDEX: 29.57 KG/M2 | WEIGHT: 218 LBS | SYSTOLIC BLOOD PRESSURE: 148 MMHG | HEART RATE: 72 BPM | TEMPERATURE: 99.3 F

## 2022-05-19 DIAGNOSIS — H65.91 FLUID LEVEL BEHIND TYMPANIC MEMBRANE OF RIGHT EAR: Primary | ICD-10-CM

## 2022-05-19 PROCEDURE — 99213 OFFICE O/P EST LOW 20 MIN: CPT | Performed by: PHYSICIAN ASSISTANT

## 2022-05-19 RX ORDER — MECLIZINE HCL 12.5 MG/1
12.5 TABLET ORAL 3 TIMES DAILY PRN
Qty: 30 TABLET | Refills: 0 | Status: SHIPPED | OUTPATIENT
Start: 2022-05-19 | End: 2022-05-29

## 2022-05-19 RX ORDER — PREDNISONE 20 MG/1
20 TABLET ORAL 2 TIMES DAILY
Qty: 10 TABLET | Refills: 0 | Status: SHIPPED | OUTPATIENT
Start: 2022-05-19 | End: 2022-05-24

## 2022-05-19 NOTE — PROGRESS NOTES
[place]  [address] [facility]  Phone: [phone]  Fax: [place]    Pt Name: Giuseppe Huynh  MRN: [id number]  [place] 1950  Date of evaluation: 5/19/2022  Provider: Keith Forte, [facility]       Chief Complaint   Patient presents with    Otalgia     right ear pain. was seen in April, but it got better for a little while- but then cam back the other night. HISTORY OF PRESENT ILLNESS  (Location/Symptom, Timing/Onset, Context/Setting, Quality, Duration, Modifying Factors, Severity.)   Giuseppe Huynh is a 70 y.o. White (non-) [1] male who presents to the office for evaluation of      Otalgia   There is pain in the right ear. This is a recurrent problem. The current episode started 1 to 4 weeks ago. The problem has been waxing and waning. There has been no fever. Nursing Notes were reviewed. REVIEW OF SYSTEMS    (2-9 systems for level 4, 10 or more for level 5)     Review of Systems   Constitutional: Negative for chills, diaphoresis and fatigue. HENT: Positive for ear pain and postnasal drip. Negative for congestion. Neurological: Positive for dizziness. Except as noted above the remainder of the review of systems was reviewed andnegative. PAST MEDICAL HISTORY   History reviewed. Past Medical History:   Diagnosis Date    Allergic rhinitis     Arthritis     BPH (benign prostatic hyperplasia)     Caffeine use     3 cups coffee/day    Chronic back pain     Diarrhea     ED (erectile dysfunction)     Esophageal stricture     GERD (gastroesophageal reflux disease)     Headache(784.0)     Hemorrhoids     Hyperlipemia     Rotator cuff tear, right 2012         SURGICAL HISTORY     History reviewed.     Past Surgical History:   Procedure Laterality Date    APPENDECTOMY      CARPAL TUNNEL RELEASE Right 02/2018    COLONOSCOPY      CYSTOSCOPY  5/4/09    ESOPHAGEAL DILATATION      HAND SURGERY      HERNIA REPAIR      LUMBAR LAMINECTOMY  10/17/2019    Dr Adeel Katz ARTHROPLASTY Left 06/25/2018    hip    UPPER GASTROINTESTINAL ENDOSCOPY           CURRENT MEDICATIONS       Current Outpatient Medications   Medication Sig Dispense Refill    predniSONE (DELTASONE) 20 MG tablet Take 1 tablet by mouth 2 times daily for 5 days 10 tablet 0    meclizine (ANTIVERT) 12.5 MG tablet Take 1 tablet by mouth 3 times daily as needed for Dizziness or Nausea 30 tablet 0    fluticasone (FLONASE) 50 MCG/ACT nasal spray 2 sprays by Each Nostril route daily 16 g 0    aspirin EC 81 MG EC tablet Take 1 tablet by mouth daily 90 tablet 1    carvedilol (COREG) 6.25 MG tablet Take 1 tablet by mouth daily 90 tablet 1    tiZANidine (ZANAFLEX) 4 MG tablet Take 1 tablet by mouth every evening 90 tablet 1    atorvastatin (LIPITOR) 40 MG tablet Take 1 tablet by mouth daily 90 tablet 1    Alum Hydroxide-Mag Carbonate (GAVISCON EXTRA RELIEF FORMULA) 508-475 MG/10ML SUSP Take 10 mLs by mouth 2 times daily as needed (acid reflux) 355 mL 1    aspirin EC 81 MG EC tablet Take 1 tablet by mouth daily 90 tablet 1    ibuprofen (ADVIL;MOTRIN) 800 MG tablet TAKE 1 TABLET TWICE A DAY AS NEEDED FOR PAIN 180 tablet 1    zinc oxide (TRIAD HYDROPHILIC) PSTE paste Apply 1 applicator topically daily 1 each 3    nitroGLYCERIN (NITROSTAT) 0.3 MG SL tablet Place 1 tablet under the tongue every 5 minutes as needed for Chest pain up to max of 3 total doses.  If no relief after 1 dose, call 911. 30 tablet 3    pantoprazole (PROTONIX) 20 MG tablet Take 1 tablet by mouth every morning (before breakfast) 90 tablet 1    acetaminophen (TYLENOL) 500 MG tablet Take 800 mg by mouth every 6 hours as needed       B Complex-C (SUPER B COMPLEX PO) Take by mouth      tamsulosin (FLOMAX) 0.4 MG capsule Take 1 capsule by mouth daily 90 capsule 3    Turmeric 500 MG CAPS Take 1,000 mg by mouth daily      Coenzyme Q10 (COQ10) 100 MG CAPS Take by mouth daily       vitamin C (ASCORBIC ACID) 500 MG tablet Take 500 mg by mouth daily      magnesium oxide (MAG-OX) 400 MG tablet Take 400 mg by mouth every other day       Misc Natural Products (URINOZINC) CAPS Take by mouth      Omega-3 Fatty Acids (OMEGA 3 PO) Take by mouth      Saccharomyces boulardii (PROBIOTIC) 250 MG CAPS Take 1 capsule by mouth daily      cetirizine (ZYRTEC) 5 MG tablet Take 10 mg by mouth daily      FIBER PO Take 3 tablets by mouth daily       Garlic 0799 MG CAPS Take 1 capsule by mouth 3 times daily (with meals)       gabapentin (NEURONTIN) 300 MG capsule Take 1 capsule by mouth 3 times daily for 180 days. Intended supply: 90 days 270 capsule 1     No current facility-administered medications for this visit. ALLERGIES     Penicillins    FAMILY HISTORY           Problem Relation Age of Onset    Hypertension Mother             Cancer Father              Family Status   Relation Name Status    MGM      MGF      [address]      PGF      Mother      Father      Sister  Alive    Brother  Alive    Brother            SOCIAL HISTORY      reports that he quit smoking about 51 years ago. His smoking use included cigarettes. He started smoking about 52 years ago. He has a 0.30 pack-year smoking history. He has never used smokeless tobacco. He reports current alcohol use of about 14.0 standard drinks of alcohol per week. He reports that he does not use drugs. PHYSICAL EXAM    (up to 7 for level 4, 8 or more for level 5)     Vitals:    22 1502   BP: (!) 148/84   Site: Left Upper Arm   Position: Sitting   Cuff Size: Large Adult   Pulse: 72   Temp: 99.3 °F (37.4 °C)   SpO2: 98%   Weight: 218 lb (98.9 kg)         Physical Exam  Vitals and nursing note reviewed. Constitutional:       General: He is not in acute distress. Appearance: Normal appearance. He is not ill-appearing. HENT:      Right Ear: A middle ear effusion is present. There is no impacted cerumen. Left Ear:  No middle ear effusion. There is no impacted cerumen. Nose: Nose normal.      Mouth/Throat:      Mouth: Mucous membranes are moist.      Pharynx: Oropharynx is clear. Eyes:      Extraocular Movements: Extraocular movements intact. Conjunctiva/sclera: Conjunctivae normal.      Pupils: Pupils are equal, round, and reactive to light. Pulmonary:      Effort: Pulmonary effort is normal.   Abdominal:      Palpations: Abdomen is soft. Skin:     General: Skin is warm and dry. Neurological:      Mental Status: He is alert and oriented to person, place, and time. DIFFERENTIAL DIAGNOSIS:       King Epley reviewed the disposition diagnosis with the patient and or their family/guardian. I have answered their questions and given discharge instructions. They voiced understanding of these instructions and did not have anyfurther questions or complaints. PROCEDURES:  No orders of the defined types were placed in this encounter. No results found for this visit on 05/19/22. FINALIMPRESSION      Visit Diagnoses and Associated Orders     Fluid level behind tympanic membrane of right ear    -  Primary         ORDERS WITHOUT AN ASSOCIATED DIAGNOSIS    predniSONE (DELTASONE) 20 MG tablet [6496]      meclizine (ANTIVERT) 12.5 MG tablet [[id number]]              PLAN     Return if symptoms worsen or fail to improve.       DISCHARGEMEDICATIONS:  Orders Placed This Encounter   Medications    predniSONE (DELTASONE) 20 MG tablet     Sig: Take 1 tablet by mouth 2 times daily for 5 days     Dispense:  10 tablet     Refill:  0    meclizine (ANTIVERT) 12.5 MG tablet     Sig: Take 1 tablet by mouth 3 times daily as needed for Dizziness or Nausea     Dispense:  30 tablet     Refill:  0         Plan:  Based on the physical exam findings-- I believe the symptoms are related to OME. Flonase daily recommended. Meclizine as needed for the dizziness/nausea. Educational materials provided on AVS.  Follow up if symptoms do not improve/worsen. Patient instructed to return to the office if symptoms worsen, return, or have any other concerns. Patient understands and is agreeable.          Darcy Lux PA-C 5/21/2022 1:18 PM

## 2022-06-02 ENCOUNTER — TELEPHONE (OUTPATIENT)
Dept: FAMILY MEDICINE CLINIC | Age: 72
End: 2022-06-02

## 2022-06-02 ENCOUNTER — HOSPITAL ENCOUNTER (OUTPATIENT)
Age: 72
Setting detail: SPECIMEN
Discharge: HOME | End: 2022-06-02

## 2022-06-02 DIAGNOSIS — E78.2 MIXED HYPERLIPIDEMIA: Primary | ICD-10-CM

## 2022-06-02 DIAGNOSIS — Z12.5 SCREENING PSA (PROSTATE SPECIFIC ANTIGEN): ICD-10-CM

## 2022-06-02 DIAGNOSIS — K21.00 GASTROESOPHAGEAL REFLUX DISEASE WITH ESOPHAGITIS WITHOUT HEMORRHAGE: ICD-10-CM

## 2022-06-02 DIAGNOSIS — R73.09 ABNORMAL GLUCOSE: ICD-10-CM

## 2022-06-02 DIAGNOSIS — Z01.89 ROUTINE LAB DRAW: ICD-10-CM

## 2022-06-02 DIAGNOSIS — N40.1 BENIGN PROSTATIC HYPERPLASIA WITH LOWER URINARY TRACT SYMPTOMS, SYMPTOM DETAILS UNSPECIFIED: ICD-10-CM

## 2022-06-02 DIAGNOSIS — E78.2 MIXED HYPERLIPIDEMIA: ICD-10-CM

## 2022-06-02 LAB
ABSOLUTE EOS #: 0.21 K/UL (ref 0–0.44)
ABSOLUTE IMMATURE GRANULOCYTE: <0.03 K/UL (ref 0–0.3)
ABSOLUTE LYMPH #: 1.82 K/UL (ref 1.1–3.7)
ABSOLUTE MONO #: 0.59 K/UL (ref 0.1–1.2)
BASOPHILS # BLD: 1 % (ref 0–2)
BASOPHILS ABSOLUTE: 0.06 K/UL (ref 0–0.2)
EOSINOPHILS RELATIVE PERCENT: 3 % (ref 1–4)
HCT VFR BLD CALC: 44.8 % (ref 40.7–50.3)
HEMOGLOBIN: 14.4 G/DL (ref 13–17)
IMMATURE GRANULOCYTES: 0 %
LYMPHOCYTES # BLD: 29 % (ref 24–43)
MCH RBC QN AUTO: 30.7 PG (ref 25.2–33.5)
MCHC RBC AUTO-ENTMCNC: 32.1 G/DL (ref 28.4–34.8)
MCV RBC AUTO: 95.5 FL (ref 82.6–102.9)
MONOCYTES # BLD: 9 % (ref 3–12)
NRBC AUTOMATED: 0 PER 100 WBC
PDW BLD-RTO: 12.5 % (ref 11.8–14.4)
PLATELET # BLD: 314 K/UL (ref 138–453)
PMV BLD AUTO: 9.2 FL (ref 8.1–13.5)
PROSTATE SPECIFIC ANTIGEN: 1.25 NG/ML
RBC # BLD: 4.69 M/UL (ref 4.21–5.77)
SEG NEUTROPHILS: 58 % (ref 36–65)
SEGMENTED NEUTROPHILS ABSOLUTE COUNT: 3.64 K/UL (ref 1.5–8.1)
WBC # BLD: 6.3 K/UL (ref 3.5–11.3)

## 2022-06-03 LAB
ALBUMIN SERPL-MCNC: 4.1 G/DL (ref 3.5–5.2)
ALBUMIN/GLOBULIN RATIO: 2 (ref 1–2.5)
ALP BLD-CCNC: 81 U/L (ref 40–129)
ALT SERPL-CCNC: 31 U/L (ref 5–41)
ANION GAP SERPL CALCULATED.3IONS-SCNC: 12 MMOL/L (ref 9–17)
AST SERPL-CCNC: 28 U/L
BILIRUB SERPL-MCNC: 0.51 MG/DL (ref 0.3–1.2)
BUN BLDV-MCNC: 16 MG/DL (ref 8–23)
CALCIUM SERPL-MCNC: 8.9 MG/DL (ref 8.6–10.4)
CHLORIDE BLD-SCNC: 105 MMOL/L (ref 98–107)
CHOLESTEROL/HDL RATIO: 3.7
CHOLESTEROL: 179 MG/DL
CO2: 23 MMOL/L (ref 20–31)
CREAT SERPL-MCNC: 0.65 MG/DL (ref 0.7–1.2)
ESTIMATED AVERAGE GLUCOSE: 128 MG/DL
GFR AFRICAN AMERICAN: >60 ML/MIN
GFR NON-AFRICAN AMERICAN: >60 ML/MIN
GFR SERPL CREATININE-BSD FRML MDRD: ABNORMAL ML/MIN/{1.73_M2}
GLUCOSE BLD-MCNC: 86 MG/DL (ref 70–99)
HBA1C MFR BLD: 6.1 % (ref 4–6)
HDLC SERPL-MCNC: 49 MG/DL
LDL CHOLESTEROL: 104 MG/DL (ref 0–130)
POTASSIUM SERPL-SCNC: 4.4 MMOL/L (ref 3.7–5.3)
SODIUM BLD-SCNC: 140 MMOL/L (ref 135–144)
TOTAL PROTEIN: 6.2 G/DL (ref 6.4–8.3)
TRIGL SERPL-MCNC: 131 MG/DL

## 2022-06-06 ENCOUNTER — TELEPHONE (OUTPATIENT)
Dept: FAMILY MEDICINE CLINIC | Age: 72
End: 2022-06-06

## 2022-06-06 DIAGNOSIS — H92.01 RIGHT EAR PAIN: Primary | ICD-10-CM

## 2022-07-04 DIAGNOSIS — M51.26 HERNIATED LUMBAR INTERVERTEBRAL DISC: ICD-10-CM

## 2022-07-05 NOTE — TELEPHONE ENCOUNTER
Last visit:3-15-22  Last Med refill: 9-23-21  Does patient have enough medication for 72 hours: No:     Next Visit Date:  Future Appointments   Date Time Provider Roldan Shaikh   7/26/2022  9:45 AM DARIA Swann NP [address] Maintenance   Topic Date Due    Annual Wellness Visit (AWV)  07/23/2022    COVID-19 Vaccine (3 - Booster for Pfizer series) 03/13/2023 (Originally 8/4/2021)    Flu vaccine (1) 09/01/2022    Depression Screen  09/23/2022    A1C test (Diabetic or Prediabetic)  06/02/2023    Lipids  06/02/2023    Colorectal Cancer Screen  03/22/2027    DTaP/Tdap/Td vaccine (3 - Td or Tdap) 11/21/2029    Shingles vaccine  Completed    Pneumococcal 65+ years Vaccine  Completed    AAA screen  Completed    Hepatitis C screen  Completed    Hepatitis A vaccine  Aged Out    Hepatitis B vaccine  Aged Out    Hib vaccine  Aged Out    Meningococcal (ACWY) vaccine  Aged Out       Hemoglobin A1C (%)   Date Value   06/02/2022 6.1 (H)             ( goal A1C is < 7)   No results found for: LABMICR  LDL Cholesterol (mg/dL)   Date Value   06/02/2022 104   06/01/2021 113     LDL Calculated (mg/dL)   Date Value   05/15/2014 115       (goal LDL is <100)   AST (U/L)   Date Value   06/02/2022 28     ALT (U/L)   Date Value   06/02/2022 31     BUN (mg/dL)   Date Value   06/02/2022 16     BP Readings from Last 3 Encounters:   05/19/22 (!) 148/84   04/28/22 130/76   04/23/22 138/70          (goal 120/80)    All Future Testing planned in [facility]  Lab Frequency Next Occurrence               Patient Active Problem List:     GERD (gastroesophageal reflux disease)     Hyperlipidemia     External hemorrhoids     Other male erectile dysfunction     Nocturia     Arthropathy     Benign prostatic hyperplasia with urinary obstruction     Allergic rhinitis     Rotator cuff arthropathy     Spinal stenosis of lumbar region without neurogenic claudication     Lumbar spondylosis     Rotator cuff tear arthropathy     Herniated lumbar intervertebral disc     DDD (degenerative disc disease), cervical     Biceps tendinitis     Nontraumatic tear of left rotator cuff     History of lumbar fusion     Retrolisthesis of vertebrae     Neck pain

## 2022-07-06 RX ORDER — GABAPENTIN 300 MG/1
300 CAPSULE ORAL 3 TIMES DAILY
Qty: 270 CAPSULE | Refills: 1 | Status: SHIPPED | OUTPATIENT
Start: 2022-07-06 | End: 2023-07-06

## 2022-07-26 ENCOUNTER — OFFICE VISIT (OUTPATIENT)
Dept: FAMILY MEDICINE CLINIC | Age: 72
End: 2022-07-26
Payer: MEDICARE

## 2022-07-26 VITALS
SYSTOLIC BLOOD PRESSURE: 142 MMHG | BODY MASS INDEX: 28.94 KG/M2 | RESPIRATION RATE: 18 BRPM | HEIGHT: 72 IN | WEIGHT: 213.7 LBS | OXYGEN SATURATION: 98 % | DIASTOLIC BLOOD PRESSURE: 74 MMHG | TEMPERATURE: 98.3 F | HEART RATE: 66 BPM

## 2022-07-26 DIAGNOSIS — G47.9 SLEEP DISORDER, UNSPECIFIED: ICD-10-CM

## 2022-07-26 DIAGNOSIS — R73.03 PREDIABETES: ICD-10-CM

## 2022-07-26 DIAGNOSIS — N40.1 BENIGN PROSTATIC HYPERPLASIA WITH URINARY OBSTRUCTION: ICD-10-CM

## 2022-07-26 DIAGNOSIS — R40.0 DAYTIME SLEEPINESS: ICD-10-CM

## 2022-07-26 DIAGNOSIS — I20.8 OTHER FORMS OF ANGINA PECTORIS (HCC): ICD-10-CM

## 2022-07-26 DIAGNOSIS — N13.8 BENIGN PROSTATIC HYPERPLASIA WITH URINARY OBSTRUCTION: ICD-10-CM

## 2022-07-26 DIAGNOSIS — M50.30 DDD (DEGENERATIVE DISC DISEASE), CERVICAL: ICD-10-CM

## 2022-07-26 DIAGNOSIS — E78.2 MIXED HYPERLIPIDEMIA: ICD-10-CM

## 2022-07-26 DIAGNOSIS — K21.00 GASTROESOPHAGEAL REFLUX DISEASE WITH ESOPHAGITIS WITHOUT HEMORRHAGE: ICD-10-CM

## 2022-07-26 DIAGNOSIS — M47.816 LUMBAR SPONDYLOSIS: ICD-10-CM

## 2022-07-26 DIAGNOSIS — R73.09 ABNORMAL GLUCOSE: ICD-10-CM

## 2022-07-26 DIAGNOSIS — Z00.00 MEDICARE ANNUAL WELLNESS VISIT, SUBSEQUENT: Primary | ICD-10-CM

## 2022-07-26 PROBLEM — I20.89 OTHER FORMS OF ANGINA PECTORIS: Status: ACTIVE | Noted: 2022-07-26

## 2022-07-26 PROCEDURE — 1123F ACP DISCUSS/DSCN MKR DOCD: CPT | Performed by: NURSE PRACTITIONER

## 2022-07-26 PROCEDURE — G0439 PPPS, SUBSEQ VISIT: HCPCS | Performed by: NURSE PRACTITIONER

## 2022-07-26 SDOH — ECONOMIC STABILITY: FOOD INSECURITY: WITHIN THE PAST 12 MONTHS, YOU WORRIED THAT YOUR FOOD WOULD RUN OUT BEFORE YOU GOT MONEY TO BUY MORE.: NEVER TRUE

## 2022-07-26 SDOH — ECONOMIC STABILITY: FOOD INSECURITY: WITHIN THE PAST 12 MONTHS, THE FOOD YOU BOUGHT JUST DIDN'T LAST AND YOU DIDN'T HAVE MONEY TO GET MORE.: NEVER TRUE

## 2022-07-26 ASSESSMENT — LIFESTYLE VARIABLES
HOW OFTEN DO YOU HAVE A DRINK CONTAINING ALCOHOL: 4 OR MORE TIMES A WEEK
HOW OFTEN DURING THE LAST YEAR HAVE YOU FOUND THAT YOU WERE NOT ABLE TO STOP DRINKING ONCE YOU HAD STARTED: 0
HOW MANY STANDARD DRINKS CONTAINING ALCOHOL DO YOU HAVE ON A TYPICAL DAY: 3 OR 4
HAS A RELATIVE, FRIEND, DOCTOR, OR ANOTHER HEALTH PROFESSIONAL EXPRESSED CONCERN ABOUT YOUR DRINKING OR SUGGESTED YOU CUT DOWN: 2
HOW OFTEN DURING THE LAST YEAR HAVE YOU NEEDED AN ALCOHOLIC DRINK FIRST THING IN THE MORNING TO GET YOURSELF GOING AFTER A NIGHT OF HEAVY DRINKING: 0
HOW OFTEN DURING THE LAST YEAR HAVE YOU HAD A FEELING OF GUILT OR REMORSE AFTER DRINKING: 0
HOW OFTEN DURING THE LAST YEAR HAVE YOU BEEN UNABLE TO REMEMBER WHAT HAPPENED THE NIGHT BEFORE BECAUSE YOU HAD BEEN DRINKING: 0
HAVE YOU OR SOMEONE ELSE BEEN INJURED AS A RESULT OF YOUR DRINKING: 0
HOW OFTEN DURING THE LAST YEAR HAVE YOU FAILED TO DO WHAT WAS NORMALLY EXPECTED FROM YOU BECAUSE OF DRINKING: 0

## 2022-07-26 ASSESSMENT — SOCIAL DETERMINANTS OF HEALTH (SDOH): HOW HARD IS IT FOR YOU TO PAY FOR THE VERY BASICS LIKE FOOD, HOUSING, MEDICAL CARE, AND HEATING?: NOT HARD AT ALL

## 2022-07-26 ASSESSMENT — PATIENT HEALTH QUESTIONNAIRE - PHQ9
SUM OF ALL RESPONSES TO PHQ9 QUESTIONS 1 & 2: 0
SUM OF ALL RESPONSES TO PHQ QUESTIONS 1-9: 0
1. LITTLE INTEREST OR PLEASURE IN DOING THINGS: 0
2. FEELING DOWN, DEPRESSED OR HOPELESS: 0
SUM OF ALL RESPONSES TO PHQ QUESTIONS 1-9: 0

## 2022-07-26 NOTE — PATIENT INSTRUCTIONS
Personalized Preventive Plan for Sudeep Guzmán - 7/26/2022  Medicare offers a range of preventive health benefits. Some of the tests and screenings are paid in full while other may be subject to a deductible, co-insurance, and/or copay. Some of these benefits include a comprehensive review of your medical history including lifestyle, illnesses that may run in your family, and various assessments and screenings as appropriate. After reviewing your medical record and screening and assessments performed today your provider may have ordered immunizations, labs, imaging, and/or referrals for you. A list of these orders (if applicable) as well as your Preventive Care list are included within your After Visit Summary for your review. Other Preventive Recommendations:    A preventive eye exam performed by an eye specialist is recommended every 1-2 years to screen for glaucoma; cataracts, macular degeneration, and other eye disorders. A preventive dental visit is recommended every 6 months. Try to get at least 150 minutes of exercise per week or 10,000 steps per day on a pedometer . Order or download the FREE \"Exercise & Physical Activity: Your Everyday Guide\" from [employer]. Call 5-921.874.6644 or search [employer] online. You need 6180-8436 mg of calcium and 2756-1442 IU of vitamin D per day. It is possible to meet your calcium requirement with diet alone, but a vitamin D supplement is usually necessary to meet this goal.  When exposed to the sun, use a sunscreen that protects against both UVA and UVB radiation with an SPF of 30 or greater. Reapply every 2 to 3 hours or after sweating, drying off with a towel, or swimming. Always wear a seat belt when traveling in a car. Always wear a helmet when riding a bicycle or motorcycle.

## 2022-07-26 NOTE — PROGRESS NOTES
Medicare Annual Wellness Visit    Kuldip Nava is here for Medicare AWV    Assessment & Plan   Medicare annual wellness visit, subsequent  Mixed hyperlipidemia  -     Lipid, Fasting; Future  Other forms of angina pectoris ([address]  Abnormal glucose  -     [facility]- [facility]  -     Hemoglobin A1C; Future  Sleep disorder, unspecified   -     Baseline Diagnostic Sleep Study; Future  Daytime sleepiness  -     Baseline Diagnostic Sleep Study; Future  DDD (degenerative disc disease), cervical  Gastroesophageal reflux disease with esophagitis without hemorrhage  Lumbar spondylosis  Benign prostatic hyperplasia with urinary obstruction    Recommendations for Preventive Services Due: see orders and patient instructions/AVS.  Recommended screening schedule for the next 5-10 years is provided to the patient in written form: see Patient Instructions/AVS.     Return for Medicare Annual Wellness Visit in 1 year. Subjective   The following acute and/or chronic problems were also addressed today:  Doing ok  Wants to know if can stop asa. Discussed risk/benefit. Ok to stop if desired. Dentist feels he needs sleep study. Noticing problems in teeth. Patient reports feeling tired, especially during the day, unsure if he snores. Does report he feels restless at night often. Will order sleep study and send to [facility] per patient request.    Patient's complete Health Risk Assessment and screening values have been reviewed and are found in Flowsheets. The following problems were reviewed today and where indicated follow up appointments were made and/or referrals ordered.     Positive Risk Factor Screenings with Interventions:        Alcohol Screening:  Alcohol Use: Heavy Drinker    Frequency of Alcohol Consumption: 4 or more times a week    Average Number of Drinks: 3 or 4    Frequency of Binge Drinking: Less than monthly     AUDIT-C Score: 6  AUDIT Total Score: 8  An AUDIT-C score of 3-7 indicates potential alcohol risk. An AUDIT Total score of 8 or more is associated with harmful or hazardous drinking. A score of 13 or more in women, and 15 or more in men, is likely to indicate alcohol dependence. Substance Use - Alcohol Interventions:  Reviewed recommended alcohol consumption guidelines with the patient          Hearing/Vision:  Do you or your family notice any trouble with your hearing that hasn't been managed with hearing aids?: (!) Yes  Do you have difficulty driving, watching TV, or doing any of your daily activities because of your eyesight?: No  Have you had an eye exam within the past year?: Yes  No results found. Hearing/Vision Interventions:  Hearing concerns:  patient declines any further evaluation/treatment for hearing issues            Objective   Vitals:    07/26/22 0944   BP: (!) 142/74   Site: Left Upper Arm   Pulse: 66   Resp: 18   Temp: 98.3 °F (36.8 °C)   TempSrc: Temporal   SpO2: 98%   Weight: 213 lb 11.2 oz (96.9 kg)   Height: 6' (1.829 m)      Body mass index is 28.98 kg/m². Allergies   Allergen Reactions    Penicillins Other (See Comments)     Other reaction(s): Rash  Pt does not recall the reaction      Prior to Visit Medications    Medication Sig Taking? Authorizing Provider   gabapentin (NEURONTIN) 300 MG capsule Take 1 capsule by mouth 3 times daily. Patient taking differently: Take 300 mg by mouth in the morning and 300 mg in the evening.  Yes Cherylene Daily, APRN - NP   fluticasone (FLONASE) 50 MCG/ACT nasal spray 2 sprays by Each Nostril route daily Yes Felipe Lake APRN - CNP   carvedilol (COREG) 6.25 MG tablet Take 1 tablet by mouth daily Yes Nidia Roy MD   tiZANidine (ZANAFLEX) 4 MG tablet Take 1 tablet by mouth every evening Yes Nidia Roy MD   atorvastatin (LIPITOR) 40 MG tablet Take 1 tablet by mouth daily Yes Nidia Roy MD   Alum Hydroxide-Mag Carbonate (GAVISCON EXTRA RELIEF FORMULA) 508-475 MG/10ML SUSP Take 10 mLs by mouth 2 times daily as needed (acid reflux) Yes Apolinar Kennedy MD   aspirin EC 81 MG EC tablet Take 1 tablet by mouth daily Yes Apolinar Kennedy MD   ibuprofen (ADVIL;MOTRIN) 800 MG tablet TAKE 1 TABLET TWICE A DAY AS NEEDED FOR PAIN Yes Apolinar Kennedy MD   zinc oxide (TRIAD HYDROPHILIC) PSTE paste Apply 1 applicator topically daily Yes Apolinar Kennedy MD   nitroGLYCERIN (NITROSTAT) 0.3 MG SL tablet Place 1 tablet under the tongue every 5 minutes as needed for Chest pain up to max of 3 total doses. If no relief after 1 dose, call 911.  Yes Apolinar Kennedy MD   pantoprazole (PROTONIX) 20 MG tablet Take 1 tablet by mouth every morning (before breakfast) Yes Apolinar Kennedy MD   acetaminophen (TYLENOL) 500 MG tablet Take 800 mg by mouth every 6 hours as needed  Yes Historical Provider, MD   B Complex-C (SUPER B COMPLEX PO) Take by mouth Yes Historical Provider, MD   tamsulosin (FLOMAX) 0.4 MG capsule Take 1 capsule by mouth daily Yes Gerber Barrett MD   Turmeric 500 MG CAPS Take 1,000 mg by mouth daily Yes Historical Provider, MD   Coenzyme Q10 (COQ10) 100 MG CAPS Take by mouth daily  Yes Historical Provider, MD   vitamin C (ASCORBIC ACID) 500 MG tablet Take 500 mg by mouth daily Yes Historical Provider, MD   magnesium oxide (MAG-OX) 400 MG tablet Take 400 mg by mouth every other day  Yes Historical Provider, MD   Misc Natural Products Tenny Relic) CAPS Take by mouth Yes Historical Provider, MD   Omega-3 Fatty Acids (OMEGA 3 PO) Take by mouth Yes Historical Provider, MD   Saccharomyces boulardii (PROBIOTIC) 250 MG CAPS Take 1 capsule by mouth daily Yes Historical Provider, MD   cetirizine (ZYRTEC) 5 MG tablet Take 10 mg by mouth daily Yes Historical Provider, MD   FIBER PO Take 3 tablets by mouth daily  Yes Historical Provider, MD   Garlic 9445 MG CAPS Take 1 capsule by mouth 3 times daily (with meals) Yes Historical Provider, MD Barrientos (Including outside providers/suppliers regularly involved in providing care):   Patient Care Team:  DARIA Forte NP as PCP - General (Nurse Practitioner)  DARIA Forte NP as PCP - [facility] Empaneled Provider  Erin Madison MD as Consulting Physician (Urology)  Julio Rodríguez as Consulting Physician (Ophthalmology)  Juancho Alanis MD (Gastroenterology)  Eric Cameron MD as Consulting Physician (Orthopedic Surgery)     Reviewed and updated this visit:  Tobacco  Allergies  Meds  Med Hx  Surg Hx  Soc Hx  Fam Hx

## 2022-07-28 ENCOUNTER — TELEPHONE (OUTPATIENT)
Dept: ONCOLOGY | Age: 72
End: 2022-07-28

## 2022-07-28 DIAGNOSIS — R73.09 ABNORMAL GLUCOSE: ICD-10-CM

## 2022-07-28 DIAGNOSIS — R73.03 PREDIABETES: Primary | ICD-10-CM

## 2022-07-28 NOTE — TELEPHONE ENCOUNTER
Referral was received, however, billable outpatient nutrition services are not available at [facility]. Dietician recommends resubmitting referral to [facility], [facility] or [facility].

## 2022-08-03 ENCOUNTER — TELEPHONE (OUTPATIENT)
Dept: FAMILY MEDICINE CLINIC | Age: 72
End: 2022-08-03

## 2022-08-03 NOTE — TELEPHONE ENCOUNTER
[facility] sleep lab called and said they called on 7/29 and spoke to gaby. They said the notes received were not signed and nothing was noted as why the patient needed the sleep study.  The signed notes with why the study is needed needs to be faxed to [phone]

## 2022-09-12 DIAGNOSIS — M62.838 MUSCLE SPASM: ICD-10-CM

## 2022-09-13 ENCOUNTER — OFFICE VISIT (OUTPATIENT)
Dept: PRIMARY CARE CLINIC | Age: 72
End: 2022-09-13
Payer: MEDICARE

## 2022-09-13 VITALS
HEART RATE: 81 BPM | DIASTOLIC BLOOD PRESSURE: 74 MMHG | TEMPERATURE: 98 F | OXYGEN SATURATION: 98 % | SYSTOLIC BLOOD PRESSURE: 128 MMHG | WEIGHT: 213 LBS | BODY MASS INDEX: 28.89 KG/M2

## 2022-09-13 DIAGNOSIS — S61.011A THUMB LACERATION, RIGHT, INITIAL ENCOUNTER: Primary | ICD-10-CM

## 2022-09-13 PROCEDURE — 99214 OFFICE O/P EST MOD 30 MIN: CPT | Performed by: FAMILY MEDICINE

## 2022-09-13 PROCEDURE — 1123F ACP DISCUSS/DSCN MKR DOCD: CPT | Performed by: FAMILY MEDICINE

## 2022-09-13 RX ORDER — TIZANIDINE 4 MG/1
4 TABLET ORAL EVERY EVENING
Qty: 90 TABLET | Refills: 1 | Status: SHIPPED | OUTPATIENT
Start: 2022-09-13 | End: 2023-09-13

## 2022-09-13 NOTE — PROGRESS NOTES
Subjective:  Leo Short presents for   Chief Complaint   Patient presents with    Laceration     To right thumb, happened this morning with a razor blade knife. Was using a . Kwesi Robe a lot at first.    Is utd on his dtap        Patient Active Problem List   Diagnosis    GERD (gastroesophageal reflux disease)    Hyperlipidemia    External hemorrhoids    Other male erectile dysfunction    Nocturia    Arthropathy    Benign prostatic hyperplasia with urinary obstruction    Allergic rhinitis    Rotator cuff arthropathy    Spinal stenosis of lumbar region without neurogenic claudication    Lumbar spondylosis    Rotator cuff tear arthropathy    Herniated lumbar intervertebral disc    DDD (degenerative disc disease), cervical    Biceps tendinitis    Nontraumatic tear of left rotator cuff    History of lumbar fusion    Retrolisthesis of vertebrae    Neck pain    Other forms of angina pectoris ([address]         Objective:  Physical Exam   Vitals: Wt Readings from Last 3 Encounters:   09/13/22 213 lb (96.6 kg)   08/08/22 213 lb (96.6 kg)   07/26/22 213 lb 11.2 oz (96.9 kg)     Ht Readings from Last 3 Encounters:   08/08/22 6' (1.829 m)   07/26/22 6' (1.829 m)   03/15/22 6' (1.829 m)     Body mass index is 28.89 kg/m². Vitals:    09/13/22 1134   BP: 128/74   Site: Left Upper Arm   Position: Sitting   Cuff Size: Medium Adult   Pulse: 81   Temp: 98 °F (36.7 °C)   SpO2: 98%   Weight: 213 lb (96.6 kg)       Constitutional: He is oriented to person, place, and time. He appears well-developed and well-nourished and in no acute distress. Answers all my questions appropriately. Right thumb - on the medial side of the thumb, he has a 1.2 cm superficial laceration. It did not penetrate all the way thru the epidermis. The wound does not open up when applying tension to the edges. Has FROM of the thumb. Normal capillary refill noted of the thumb. Sensation is the same of the right hand.         Assessment:   Encounter Diagnosis   Name Primary? Thumb laceration, right, initial encounter Yes         Plan:     Patient does NOT need sutures. We put steri strips on it for support then put a fluffy dressing on it. He is to avoid torquing the skin. He can bath but otherwise keep the wound away form dirty water. Watch for infection. Medications Discontinued During This Encounter   Medication Reason    zinc oxide (TRIAD HYDROPHILIC) PSTE paste      THE ABOVE NOTED DISCONTINUED MEDS MAY ONLY BE FROM 'CLEANING UP' THE MED LIST AND WERE NOT ACTUALLY CANCELED;  SEE CHART FOR DETAILS! No orders of the defined types were placed in this encounter. No orders of the defined types were placed in this encounter. Return in about 1 week (around 9/20/2022). There are no Patient Instructions on file for this visit.   Follow up with your provider

## 2022-09-13 NOTE — TELEPHONE ENCOUNTER
LOV 7/26/22  LRF 3/24/22  RTO 1 year    Health Maintenance   Topic Date Due    Flu vaccine (1) 09/01/2022    COVID-19 Vaccine (3 - Booster for Pfizer series) 03/13/2023 (Originally 8/4/2021)    A1C test (Diabetic or Prediabetic)  06/02/2023    Lipids  06/02/2023    Depression Screen  07/26/2023    Annual Wellness Visit (AWV)  07/27/2023    Colorectal Cancer Screen  03/22/2027    DTaP/Tdap/Td vaccine (3 - Td or Tdap) 11/21/2029    Shingles vaccine  Completed    Pneumococcal 65+ years Vaccine  Completed    AAA screen  Completed    Hepatitis C screen  Completed    Hepatitis A vaccine  Aged Out    Hepatitis B vaccine  Aged Out    Hib vaccine  Aged Out    Meningococcal (ACWY) vaccine  Aged Out             (applicable per patient's age: Cancer Screenings, Depression Screening, Fall Risk Screening, Immunizations)    Hemoglobin A1C (%)   Date Value   06/02/2022 6.1 (H)     LDL Cholesterol (mg/dL)   Date Value   06/02/2022 104     LDL Calculated (mg/dL)   Date Value   05/15/2014 115     AST (U/L)   Date Value   06/02/2022 28     ALT (U/L)   Date Value   06/02/2022 31     BUN (mg/dL)   Date Value   06/02/2022 16      (goal A1C is < 7)   (goal LDL is <100) need 30-50% reduction from baseline     BP Readings from Last 3 Encounters:   09/13/22 128/74   07/26/22 (!) 142/74   05/19/22 (!) 148/84    (goal /80)      All Future Testing planned in [facility]:  Lab Frequency Next Occurrence   Baseline Diagnostic Sleep Study Once 07/26/2022   Lipid, Fasting Once 01/26/2023   Hemoglobin A1C Once 01/26/2023       Next Visit Date:  Future Appointments   Date Time Provider Roldan Shaikh   1/26/2023  9:15 AM DARIA Domínguez NP            Patient Active Problem List:     GERD (gastroesophageal reflux disease)     Hyperlipidemia     External hemorrhoids     Other male erectile dysfunction     Nocturia     Arthropathy     Benign prostatic hyperplasia with urinary obstruction     Allergic rhinitis     Rotator cuff arthropathy     Spinal stenosis of lumbar region without neurogenic claudication     Lumbar spondylosis     Rotator cuff tear arthropathy     Herniated lumbar intervertebral disc     DDD (degenerative disc disease), cervical     Biceps tendinitis     Nontraumatic tear of left rotator cuff     History of lumbar fusion     Retrolisthesis of vertebrae     Neck pain     Other forms of angina pectoris ([address]

## 2022-09-28 ENCOUNTER — TELEPHONE (OUTPATIENT)
Dept: FAMILY MEDICINE CLINIC | Age: 72
End: 2022-09-28

## 2022-09-28 DIAGNOSIS — I25.10 CORONARY ARTERY DISEASE INVOLVING NATIVE CORONARY ARTERY OF NATIVE HEART WITHOUT ANGINA PECTORIS: ICD-10-CM

## 2022-09-28 DIAGNOSIS — E78.2 MIXED HYPERLIPIDEMIA: ICD-10-CM

## 2022-09-28 DIAGNOSIS — I10 PRIMARY HYPERTENSION: Primary | ICD-10-CM

## 2022-09-28 DIAGNOSIS — I65.22 OCCLUSION AND STENOSIS OF LEFT CAROTID ARTERY: ICD-10-CM

## 2022-09-28 NOTE — TELEPHONE ENCOUNTER
CLINICAL STAFF: Please call patient and get more information to send to PCP to make a decision on if appt is needed or if its something that can be handled via telephone

## 2022-09-28 NOTE — TELEPHONE ENCOUNTER
----- Message from Amandeep Fabian sent at 9/19/2022 10:15 AM EDT -----  Subject: Appointment Request    Reason for Call: Established Patient Appointment needed: Semi-Routine Skin   Problem    QUESTIONS    Reason for appointment request? No appointments available during search     Additional Information for Provider? Patient needs to be seen for a   recurring rash. Patient saw the doctor at the [address] [place] [address] Drive wants him to   change/stop medications and the patient would like to discuss with PCP   before doing so.  Please call home phone if the patient does not answer   mobile.   ---------------------------------------------------------------------------  --------------  [facility]  [phone]; OK to leave message on voicemail  ---------------------------------------------------------------------------  --------------  SCRIPT ANSWERS  COVID Screen: [place]ian [place]

## 2022-09-29 NOTE — TELEPHONE ENCOUNTER
Patient telephoned office asking for clarification. Patient said he is confused as to how much of his meds he should be taking because the [place] doc says differently than [place]. Writer explained that the meds are usually adjusted according to what works best for the patient. Here is what patient said he needs clarification on:    Josiah lo said continue taking 81mg ASA, [place] said not to. KYM lo said 40mg atorvastatin, [place] said cut it to 20mg  KYM lo said Coreg 6.25mg is usually taken 2x daily, [place] said to take 1x daily. Concerning the rash, he said it is gone right now. He also asked if he should make an appointment for earlier than the one he has so he can discuss this?

## 2022-10-17 NOTE — TELEPHONE ENCOUNTER
Last visit: 3/15/22  Last Med refill: 3/15/22  Does patient have enough medication for 72 hours: Yes    Next Visit Date:  Future Appointments   Date Time Provider Roldan Shaikh   7/25/2022  9:45 AM [facility] [address] Maintenance   Topic Date Due    COVID-19 Vaccine (3 - Booster for [employer] series) 03/13/2023 (Originally 8/4/2021)    Lipid screen  06/01/2022    Annual Wellness Visit (AWV)  07/23/2022    Depression Screen  09/23/2022    Colorectal Cancer Screen  03/22/2027    DTaP/Tdap/Td vaccine (3 - Td or Tdap) 11/21/2029    Flu vaccine  Completed    Shingles Vaccine  Completed    Pneumococcal 65+ years Vaccine  Completed    AAA screen  Completed    Hepatitis C screen  Completed    Hepatitis A vaccine  Aged Out    Hepatitis B vaccine  Aged Out    Hib vaccine  Aged Out    Meningococcal (ACWY) vaccine  Aged Out       No results found for: LABA1C          ( goal A1C is < 7)   No results found for: LABMICR  LDL Cholesterol (mg/dL)   Date Value   06/01/2021 113   05/22/2020 103     LDL Calculated (mg/dL)   Date Value   05/15/2014 115       (goal LDL is <100)   AST (U/L)   Date Value   06/01/2021 35     ALT (U/L)   Date Value   06/01/2021 31     BUN (mg/dL)   Date Value   06/01/2021 13     BP Readings from Last 3 Encounters:   03/15/22 130/64   10/18/21 115/63   10/11/21 136/72          (goal 120/80)    All Future Testing planned in [facility]  Lab Frequency Next Occurrence               Patient Active Problem List:     GERD (gastroesophageal reflux disease)     Hyperlipidemia     External hemorrhoids     Other male erectile dysfunction     Nocturia     Arthropathy     Benign prostatic hyperplasia with urinary obstruction     Allergic rhinitis     Rotator cuff arthropathy     Spinal stenosis of lumbar region without neurogenic claudication     Lumbar spondylosis     Rotator cuff tear arthropathy     Herniated lumbar intervertebral disc     DDD (degenerative disc disease), cervical     Biceps tendinitis     Nontraumatic tear of left rotator cuff     History of lumbar fusion     Retrolisthesis of vertebrae     Neck pain Humira Counseling:  I discussed with the patient the risks of adalimumab including but not limited to myelosuppression, immunosuppression, autoimmune hepatitis, demyelinating diseases, lymphoma, and serious infections.  The patient understands that monitoring is required including a PPD at baseline and must alert us or the primary physician if symptoms of infection or other concerning signs are noted.

## 2022-10-21 DIAGNOSIS — R07.9 CHEST PAIN, UNSPECIFIED TYPE: ICD-10-CM

## 2022-10-21 RX ORDER — CARVEDILOL 6.25 MG/1
6.25 TABLET ORAL DAILY
Qty: 90 TABLET | Refills: 1 | Status: SHIPPED | OUTPATIENT
Start: 2022-10-21 | End: 2022-11-30 | Stop reason: DRUGHIGH

## 2022-10-21 NOTE — TELEPHONE ENCOUNTER
LOV 7/26/22  LRF 3/25/22  RTO 1 year    Health Maintenance   Topic Date Due    Flu vaccine (1) 08/01/2022    COVID-19 Vaccine (3 - Booster for Pfizer series) 03/13/2023 (Originally 4/29/2021)    A1C test (Diabetic or Prediabetic)  06/02/2023    Lipids  06/02/2023    Depression Screen  07/26/2023    Annual Wellness Visit (AWV)  07/27/2023    Colorectal Cancer Screen  03/22/2027    DTaP/Tdap/Td vaccine (3 - Td or Tdap) 11/21/2029    Shingles vaccine  Completed    Pneumococcal 65+ years Vaccine  Completed    AAA screen  Completed    Hepatitis C screen  Completed    Hepatitis A vaccine  Aged Out    Hib vaccine  Aged Out    Meningococcal (ACWY) vaccine  Aged Out             (applicable per patient's age: Cancer Screenings, Depression Screening, Fall Risk Screening, Immunizations)    Hemoglobin A1C (%)   Date Value   06/02/2022 6.1 (H)     LDL Cholesterol (mg/dL)   Date Value   06/02/2022 104     LDL Calculated (mg/dL)   Date Value   05/15/2014 115     AST (U/L)   Date Value   06/02/2022 28     ALT (U/L)   Date Value   06/02/2022 31     BUN (mg/dL)   Date Value   06/02/2022 16      (goal A1C is < 7)   (goal LDL is <100) need 30-50% reduction from baseline     BP Readings from Last 3 Encounters:   09/13/22 128/74   07/26/22 (!) 142/74   05/19/22 (!) 148/84    (goal /80)      All Future Testing planned in [facility]:  Lab Frequency Next Occurrence   Baseline Diagnostic Sleep Study Once 07/26/2022   Lipid, Fasting Once 01/26/2023   Hemoglobin A1C Once 01/26/2023       Next Visit Date:  Future Appointments   Date Time Provider Roldan Shaikh   1/26/2023  9:15 AM DARIA Cabrera - [facility] [place]            Patient Active Problem List:     GERD (gastroesophageal reflux disease)     Hyperlipidemia     External hemorrhoids     Other male erectile dysfunction     Nocturia     Arthropathy     Benign prostatic hyperplasia with urinary obstruction     Allergic rhinitis     Rotator cuff arthropathy     Spinal stenosis of lumbar region without neurogenic claudication     Lumbar spondylosis     Rotator cuff tear arthropathy     Herniated lumbar intervertebral disc     DDD (degenerative disc disease), cervical     Biceps tendinitis     Nontraumatic tear of left rotator cuff     History of lumbar fusion     Retrolisthesis of vertebrae     Neck pain     Other forms of angina pectoris ([address]

## 2022-11-25 NOTE — TELEPHONE ENCOUNTER
Writer spoke with patient and he did not speak with anyone in the office states he did stop in at some point and told Westley Torres and she told the patient she would get with Cherie Palumbo. Writer advised patient to follow medications per Cherie Palumbo and to complete blood work before next office visit 1/26/23.

## 2022-11-30 RX ORDER — CARVEDILOL 3.12 MG/1
3.12 TABLET ORAL 2 TIMES DAILY
Qty: 60 TABLET | Refills: 1 | Status: SHIPPED | OUTPATIENT
Start: 2022-11-30 | End: 2023-11-30

## 2022-11-30 NOTE — TELEPHONE ENCOUNTER
Spoke to patient via phone. Discussed his concerns. Reviewed testing over the last year or so. He does report getting lightheaded at times  Recommend the following: continue ASA 81mg daily. Continue lipitor at 40mg. Patient declines higher dose. Start carvedilol 3.125mg BID. Monitor BP and report readings to the office for review. Complete carotid duplex as ordered.

## 2022-12-05 DIAGNOSIS — K21.00 GASTROESOPHAGEAL REFLUX DISEASE WITH ESOPHAGITIS WITHOUT HEMORRHAGE: ICD-10-CM

## 2022-12-06 RX ORDER — ATORVASTATIN CALCIUM 40 MG/1
40 TABLET, FILM COATED ORAL DAILY
Qty: 90 TABLET | Refills: 1 | Status: SHIPPED | OUTPATIENT
Start: 2022-12-06 | End: 2023-12-06

## 2022-12-06 NOTE — TELEPHONE ENCOUNTER
LOV 3/15/22  LRF 3/24/22    Next appt 1/26/23      Health Maintenance   Topic Date Due    Flu vaccine (1) 08/01/2022    COVID-19 Vaccine (3 - Booster for Pfizer series) 03/13/2023 (Originally 4/29/2021)    A1C test (Diabetic or Prediabetic)  06/02/2023    Lipids  06/02/2023    Depression Screen  07/26/2023    Annual Wellness Visit (AWV)  07/27/2023    Colorectal Cancer Screen  03/22/2027    DTaP/Tdap/Td vaccine (3 - Td or Tdap) 11/21/2029    Shingles vaccine  Completed    Pneumococcal 65+ years Vaccine  Completed    AAA screen  Completed    Hepatitis C screen  Completed    Hepatitis A vaccine  Aged Out    Hib vaccine  Aged Out    Meningococcal (ACWY) vaccine  Aged Out             (applicable per patient's age: Cancer Screenings, Depression Screening, Fall Risk Screening, Immunizations)    Hemoglobin A1C (%)   Date Value   06/02/2022 6.1 (H)     LDL Cholesterol (mg/dL)   Date Value   06/02/2022 104     LDL Calculated (mg/dL)   Date Value   05/15/2014 115     AST (U/L)   Date Value   06/02/2022 28     ALT (U/L)   Date Value   06/02/2022 31     BUN (mg/dL)   Date Value   06/02/2022 16      (goal A1C is < 7)   (goal LDL is <100) need 30-50% reduction from baseline     BP Readings from Last 3 Encounters:   09/13/22 128/74   07/26/22 (!) 142/74   05/19/22 (!) 148/84    (goal /80)      All Future Testing planned in [facility]:  Lab Frequency Next Occurrence   Lipid, Fasting Once 01/26/2023   Hemoglobin A1C Once 01/26/2023   VL DUP CAROTID BILATERAL Once 11/30/2022       Next Visit Date:  Future Appointments   Date Time Provider Roldan Shaikh   1/26/2023  9:15 AM DARIA Kilpatrick - NP Angela Mariee [address] [facility]            Patient Active Problem List:     GERD (gastroesophageal reflux disease)     Hyperlipidemia     External hemorrhoids     Other male erectile dysfunction     Nocturia     Arthropathy     Benign prostatic hyperplasia with urinary obstruction     Allergic rhinitis     Rotator cuff arthropathy     Spinal stenosis of lumbar region without neurogenic claudication     Lumbar spondylosis     Rotator cuff tear arthropathy     Herniated lumbar intervertebral disc     DDD (degenerative disc disease), cervical     Biceps tendinitis     Nontraumatic tear of left rotator cuff     History of lumbar fusion     Retrolisthesis of vertebrae     Neck pain     Other forms of angina pectoris ([address]

## 2023-01-11 ENCOUNTER — HOSPITAL ENCOUNTER (OUTPATIENT)
Dept: VASCULAR LAB | Age: 73
Discharge: HOME | End: 2023-01-11
Payer: MEDICARE

## 2023-01-11 DIAGNOSIS — I25.10 CORONARY ARTERY DISEASE INVOLVING NATIVE CORONARY ARTERY OF NATIVE HEART WITHOUT ANGINA PECTORIS: ICD-10-CM

## 2023-01-11 DIAGNOSIS — I65.22 OCCLUSION AND STENOSIS OF LEFT CAROTID ARTERY: ICD-10-CM

## 2023-01-11 DIAGNOSIS — E78.2 MIXED HYPERLIPIDEMIA: ICD-10-CM

## 2023-01-11 PROCEDURE — 93880 EXTRACRANIAL BILAT STUDY: CPT

## 2023-01-19 ENCOUNTER — HOSPITAL ENCOUNTER (OUTPATIENT)
Age: 73
Setting detail: SPECIMEN
Discharge: HOME | End: 2023-01-19

## 2023-01-19 DIAGNOSIS — E78.2 MIXED HYPERLIPIDEMIA: ICD-10-CM

## 2023-01-19 DIAGNOSIS — R73.03 PREDIABETES: ICD-10-CM

## 2023-01-19 LAB
CHOLESTEROL, FASTING: 165 MG/DL
CHOLESTEROL/HDL RATIO: 2.8
HDLC SERPL-MCNC: 58 MG/DL
LDL CHOLESTEROL: 85 MG/DL (ref 0–130)
TRIGLYCERIDE, FASTING: 110 MG/DL

## 2023-01-20 LAB
ESTIMATED AVERAGE GLUCOSE: 111 MG/DL
HBA1C MFR BLD: 5.5 % (ref 4–6)

## 2023-01-26 ENCOUNTER — OFFICE VISIT (OUTPATIENT)
Dept: FAMILY MEDICINE CLINIC | Age: 73
End: 2023-01-26

## 2023-01-26 VITALS
DIASTOLIC BLOOD PRESSURE: 70 MMHG | HEART RATE: 70 BPM | WEIGHT: 210 LBS | HEIGHT: 72 IN | BODY MASS INDEX: 28.44 KG/M2 | SYSTOLIC BLOOD PRESSURE: 134 MMHG | OXYGEN SATURATION: 98 % | TEMPERATURE: 98.6 F

## 2023-01-26 DIAGNOSIS — G47.9 SLEEP DISORDER, UNSPECIFIED: ICD-10-CM

## 2023-01-26 DIAGNOSIS — I25.10 CORONARY ARTERY DISEASE INVOLVING NATIVE CORONARY ARTERY OF NATIVE HEART WITHOUT ANGINA PECTORIS: ICD-10-CM

## 2023-01-26 DIAGNOSIS — I20.8 OTHER FORMS OF ANGINA PECTORIS (HCC): ICD-10-CM

## 2023-01-26 DIAGNOSIS — E78.2 MIXED HYPERLIPIDEMIA: ICD-10-CM

## 2023-01-26 DIAGNOSIS — I10 PRIMARY HYPERTENSION: Primary | ICD-10-CM

## 2023-01-26 DIAGNOSIS — R40.0 DAYTIME SLEEPINESS: ICD-10-CM

## 2023-01-26 DIAGNOSIS — G47.30 SEVERE SLEEP APNEA: ICD-10-CM

## 2023-01-26 DIAGNOSIS — R73.03 PREDIABETES: ICD-10-CM

## 2023-01-26 RX ORDER — LISINOPRIL 10 MG/1
10 TABLET ORAL DAILY
Qty: 30 TABLET | Refills: 1 | Status: SHIPPED | OUTPATIENT
Start: 2023-01-26 | End: 2024-01-26

## 2023-01-26 ASSESSMENT — PATIENT HEALTH QUESTIONNAIRE - PHQ9
SUM OF ALL RESPONSES TO PHQ QUESTIONS 1-9: 0
2. FEELING DOWN, DEPRESSED OR HOPELESS: 0
SUM OF ALL RESPONSES TO PHQ QUESTIONS 1-9: 0
SUM OF ALL RESPONSES TO PHQ9 QUESTIONS 1 & 2: 0
SUM OF ALL RESPONSES TO PHQ QUESTIONS 1-9: 0
1. LITTLE INTEREST OR PLEASURE IN DOING THINGS: 0
SUM OF ALL RESPONSES TO PHQ QUESTIONS 1-9: 0

## 2023-01-26 ASSESSMENT — ANXIETY QUESTIONNAIRES
3. WORRYING TOO MUCH ABOUT DIFFERENT THINGS: 0
1. FEELING NERVOUS, ANXIOUS, OR ON EDGE: 0
7. FEELING AFRAID AS IF SOMETHING AWFUL MIGHT HAPPEN: 0
IF YOU CHECKED OFF ANY PROBLEMS ON THIS QUESTIONNAIRE, HOW DIFFICULT HAVE THESE PROBLEMS MADE IT FOR YOU TO DO YOUR WORK, TAKE CARE OF THINGS AT HOME, OR GET ALONG WITH OTHER PEOPLE: NOT DIFFICULT AT ALL
4. TROUBLE RELAXING: 0
2. NOT BEING ABLE TO STOP OR CONTROL WORRYING: 0
GAD7 TOTAL SCORE: 0
6. BECOMING EASILY ANNOYED OR IRRITABLE: 0
5. BEING SO RESTLESS THAT IT IS HARD TO SIT STILL: 0

## 2023-01-26 NOTE — PROGRESS NOTES
"Problem: Adult Behavioral Health Plan of Care  Goal: Rounds/Family Conference  Outcome: Ongoing (interventions implemented as appropriate)  TREATMENT TEAM UPDATE      Chief Complaint:    Patient  presents with the chief complaint of depression and SI, "I have a lot going on.. And my mind went to a dark place."  Patient has traits similar to a personality disorder.     Patient stated she had childhood trauma; says she has a history of unstable relationships.       Current:    Patient is having relationship concerns with ex-boyfriend whom she is attempting to evict from her apartment but he is on the lease; they have broken up for a year.  Patient has a new boyfriend and she is worried about how to navigate between these situations and how to address these issues with her new boyfriend. She stated that the stress is contributing to her having recent seizures. Patient stated that the stress of attempting to learn what to do in this situation is contributing to medical issues including being "super depressed." Patient stated that she has had no previous inpatient psychiatric treatment.     Patient woke up early; currently feeling tired she stated.     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     Patient has traits similar to borderline personality disorder at this time.     Patient is currently seeing a psychiatrist through the local mental health center in [place].     Patient stated that she is feeling better this morning.     Patient is interested in preparing for discharge today; feels she is stable enough to be considered for discharge.     Plan:    Patient will be encouraged to attend group psychotherapy with focused attention on decision-making skills acquisition.     Patient will be encouraged to participate in safety planning with focused attention on identifying warning signs before a crisis develops.     Patient will be assisted in goal setting as is her wish.     Patient " Rosario Rios (:  1950) is a 67 y.o. male,Established patient, here for evaluation of the following chief complaint(s):  Hypertension and Diabetes         ASSESSMENT/PLAN:  1. Primary hypertension  -     lisinopril (PRINIVIL;ZESTRIL) 10 MG tablet; Take 1 tablet by mouth daily Take 1/2 tab for the first week then increase to full tab, Disp-30 tablet, R-1Normal  2. Severe sleep apnea  -     Sleep Study with PAP Titration; Future  3. Other forms of angina pectoris ([address]  4. Mixed hyperlipidemia  5. Coronary artery disease involving native coronary artery of native heart without angina pectoris  6. Prediabetes    Return if symptoms worsen or fail to improve. Subjective   SUBJECTIVE/OBJECTIVE:  Routine follow up. BP is mostly well controlled. Did bring in his home Bp monitor. Did review. Mostly in the upper 130s consistently. Does have some in the 140s, one in 150. Discussed adding lisinopril. Is agreeable. Does get lightheaded sometimes. Especially if he turns his head to the right for too long. Does happen more often in the mornings. Recent bilateral carotid duplex scan revealed less than 50% stenosis bilaterally. Review of Systems   Constitutional:  Negative for activity change, appetite change, fatigue and fever. HENT:  Negative for congestion, rhinorrhea and sore throat. Eyes:  Negative for visual disturbance. Respiratory:  Negative for cough and shortness of breath. Cardiovascular:  Negative for chest pain and palpitations. Gastrointestinal:  Negative for abdominal distention, abdominal pain, constipation, diarrhea, nausea and vomiting. Endocrine: Negative for polydipsia, polyphagia and polyuria. Genitourinary:  Negative for dysuria and urgency. Follows with urology   Allergic/Immunologic: Negative for immunocompromised state. Neurological:  Positive for light-headedness (at times). Negative for syncope and headaches.    Psychiatric/Behavioral:  Negative for decreased concentration, dysphoric mood, sleep disturbance and suicidal ideas. The patient is not nervous/anxious. Objective   Physical Exam  Vitals and nursing note reviewed. Constitutional:       General: He is not in acute distress. Appearance: He is not ill-appearing. HENT:      Head: Normocephalic. Nose: Nose normal.      Mouth/Throat:      Lips: Pink. Cardiovascular:      Pulses:           Carotid pulses are 2+ on the right side and 2+ on the left side. Radial pulses are 2+ on the right side and 2+ on the left side. Heart sounds: Normal heart sounds. No murmur heard. Pulmonary:      Effort: Pulmonary effort is normal. No respiratory distress. Breath sounds: No decreased breath sounds, wheezing or rhonchi. Neurological:      Mental Status: He is alert and oriented to person, place, and time. Psychiatric:         Attention and Perception: Attention normal.         Speech: Speech normal.         Behavior: Behavior is cooperative. An electronic signature was used to authenticate this note.     --DARIA Casillas NP will be encouraged to identify outpatient providers to supplement her medication management issues that she is receiving through [facility].     Patient is interested in identifying how to manage her current difficulties in the dilemma she stated she is currently in regarding her ex-boyfriend and current boyfriend.

## 2023-02-05 ASSESSMENT — ENCOUNTER SYMPTOMS
COUGH: 0
CONSTIPATION: 0
VOMITING: 0
RHINORRHEA: 0
DIARRHEA: 0
SORE THROAT: 0
SHORTNESS OF BREATH: 0
NAUSEA: 0
ABDOMINAL PAIN: 0
ABDOMINAL DISTENTION: 0

## 2023-02-07 ENCOUNTER — TELEPHONE (OUTPATIENT)
Dept: FAMILY MEDICINE CLINIC | Age: 73
End: 2023-02-07

## 2023-02-07 NOTE — TELEPHONE ENCOUNTER
[facility] sleep lab called stated called patient on 1/27 and patient declined sleep study at this time. [facility] stated they will reach out to patient again in March.

## 2023-02-15 DIAGNOSIS — M62.838 MUSCLE SPASM: ICD-10-CM

## 2023-02-15 RX ORDER — TIZANIDINE 4 MG/1
4 TABLET ORAL EVERY EVENING
Qty: 90 TABLET | Refills: 1 | Status: SHIPPED | OUTPATIENT
Start: 2023-02-15 | End: 2024-02-15

## 2023-02-15 NOTE — TELEPHONE ENCOUNTER
LOV 1/26/23  LRF 9/13/22    Next appt 7/25/23      Health Maintenance   Topic Date Due    COVID-19 Vaccine (3 - Booster for Pfizer series) 03/13/2023 (Originally 4/29/2021)    Annual Wellness Visit (AWV)  07/27/2023    Lipids  01/19/2024    Depression Screen  01/26/2024    Colorectal Cancer Screen  03/22/2027    DTaP/Tdap/Td vaccine (3 - Td or Tdap) 11/21/2029    Flu vaccine  Completed    Shingles vaccine  Completed    Pneumococcal 65+ years Vaccine  Completed    AAA screen  Completed    Hepatitis C screen  Completed    Hepatitis A vaccine  Aged Out    Hib vaccine  Aged Out    Meningococcal (ACWY) vaccine  Aged Out             (applicable per patient's age: Cancer Screenings, Depression Screening, Fall Risk Screening, Immunizations)    Hemoglobin A1C (%)   Date Value   01/19/2023 5.5   06/02/2022 6.1 (H)     LDL Cholesterol (mg/dL)   Date Value   01/19/2023 85     LDL Calculated (mg/dL)   Date Value   05/15/2014 115     AST (U/L)   Date Value   06/02/2022 28     ALT (U/L)   Date Value   06/02/2022 31     BUN (mg/dL)   Date Value   06/02/2022 16      (goal A1C is < 7)   (goal LDL is <100) need 30-50% reduction from baseline     BP Readings from Last 3 Encounters:   01/26/23 134/70   09/13/22 128/74   07/26/22 (!) 142/74    (goal /80)      All Future Testing planned in [facility]:  Lab Frequency Next Occurrence   Sleep Study with PAP Titration Once 01/26/2023       Next Visit Date:  Future Appointments   Date Time Provider Roldan Shaikh   7/25/2023 11:15 AM DARIA Martinez NP [facility]            Patient Active Problem List:     GERD (gastroesophageal reflux disease)     Hyperlipidemia     External hemorrhoids     Other male erectile dysfunction     Nocturia     Arthropathy     Benign prostatic hyperplasia with urinary obstruction     Allergic rhinitis     Rotator cuff arthropathy     Spinal stenosis of lumbar region without neurogenic claudication     Lumbar spondylosis     Rotator cuff tear arthropathy     Herniated lumbar intervertebral disc     DDD (degenerative disc disease), cervical     Biceps tendinitis     Nontraumatic tear of left rotator cuff     History of lumbar fusion     Retrolisthesis of vertebrae     Neck pain     Other forms of angina pectoris ([address]

## 2023-03-03 ENCOUNTER — TELEPHONE (OUTPATIENT)
Dept: FAMILY MEDICINE CLINIC | Age: 73
End: 2023-03-03

## 2023-03-03 NOTE — TELEPHONE ENCOUNTER
Spoke to patient via phone. Discussed recent BP readings. He recently started taking coreg 6.25mg every morning. Continues with lisinopril in the evening. BP is running in the 130s. Will continue to monitor BP and reports readings in one week. He also reports loose BM daily; sometimes 4-5 per day. Reports it started after he started taking protonix. Encouraged to discuss with GI provider.

## 2023-03-15 ENCOUNTER — TELEPHONE (OUTPATIENT)
Dept: FAMILY MEDICINE CLINIC | Age: 73
End: 2023-03-15

## 2023-03-17 DIAGNOSIS — I10 PRIMARY HYPERTENSION: Primary | ICD-10-CM

## 2023-03-17 RX ORDER — LOSARTAN POTASSIUM 25 MG/1
25 TABLET ORAL DAILY
Qty: 30 TABLET | Refills: 1 | Status: SHIPPED | OUTPATIENT
Start: 2023-03-17 | End: 2023-04-16

## 2023-04-06 DIAGNOSIS — I10 PRIMARY HYPERTENSION: ICD-10-CM

## 2023-04-06 RX ORDER — LOSARTAN POTASSIUM 50 MG/1
50 TABLET ORAL DAILY
Qty: 90 TABLET | Refills: 1 | Status: SHIPPED | OUTPATIENT
Start: 2023-04-06 | End: 2024-04-05

## 2023-04-06 RX ORDER — CARVEDILOL 3.12 MG/1
3.12 TABLET ORAL 2 TIMES DAILY
Qty: 60 TABLET | Refills: 1 | Status: CANCELLED | OUTPATIENT
Start: 2023-04-06 | End: 2024-04-05

## 2023-04-06 RX ORDER — CARVEDILOL 6.25 MG/1
6.25 TABLET ORAL 2 TIMES DAILY
Qty: 180 TABLET | Refills: 1 | Status: SHIPPED | OUTPATIENT
Start: 2023-04-06 | End: 2024-04-05

## 2023-04-06 NOTE — PROGRESS NOTES
Discussed Bp via phone. Bp is better but room for improvement. Will make changes in medications. Has not received Cpap yet. Will have staff call and follow up.

## 2023-04-11 ENCOUNTER — TELEPHONE (OUTPATIENT)
Dept: FAMILY MEDICINE CLINIC | Age: 73
End: 2023-04-11

## 2023-04-17 NOTE — TELEPHONE ENCOUNTER
[facility] called and stated the script does not have the pressure settings or supplies needed so they need that on there so they can get it sent to him.

## 2023-04-18 NOTE — TELEPHONE ENCOUNTER
Edis we call [facility] again please? Get information and we can give numbers verbally to [address] if needed.

## 2023-05-04 DIAGNOSIS — K21.00 GASTROESOPHAGEAL REFLUX DISEASE WITH ESOPHAGITIS WITHOUT HEMORRHAGE: ICD-10-CM

## 2023-05-04 RX ORDER — ATORVASTATIN CALCIUM 40 MG/1
TABLET, FILM COATED ORAL
Qty: 90 TABLET | Refills: 3 | Status: SHIPPED | OUTPATIENT
Start: 2023-05-04

## 2023-05-27 ENCOUNTER — OFFICE VISIT (OUTPATIENT)
Dept: PRIMARY CARE CLINIC | Age: 73
End: 2023-05-27
Payer: MEDICARE

## 2023-05-27 VITALS
WEIGHT: 210 LBS | TEMPERATURE: 98.7 F | OXYGEN SATURATION: 98 % | BODY MASS INDEX: 28.48 KG/M2 | SYSTOLIC BLOOD PRESSURE: 126 MMHG | DIASTOLIC BLOOD PRESSURE: 64 MMHG | HEART RATE: 76 BPM

## 2023-05-27 DIAGNOSIS — H65.113 ACUTE MUCOID OTITIS MEDIA OF BOTH EARS: Primary | ICD-10-CM

## 2023-05-27 DIAGNOSIS — R42 LIGHTHEADEDNESS: ICD-10-CM

## 2023-05-27 DIAGNOSIS — J30.9 ALLERGIC RHINITIS, UNSPECIFIED SEASONALITY, UNSPECIFIED TRIGGER: ICD-10-CM

## 2023-05-27 PROCEDURE — 99213 OFFICE O/P EST LOW 20 MIN: CPT

## 2023-05-27 PROCEDURE — 1123F ACP DISCUSS/DSCN MKR DOCD: CPT

## 2023-05-27 RX ORDER — DOXYCYCLINE HYCLATE 100 MG
100 TABLET ORAL 2 TIMES DAILY
Qty: 14 TABLET | Refills: 0 | Status: SHIPPED | OUTPATIENT
Start: 2023-05-27 | End: 2023-06-03

## 2023-05-27 ASSESSMENT — ENCOUNTER SYMPTOMS
COUGH: 0
SORE THROAT: 0
EYE DISCHARGE: 0
SHORTNESS OF BREATH: 0

## 2023-05-27 NOTE — PROGRESS NOTES
[address] [place]  [address] [facility],[address]  [address] [facility] [address]    [address] [place]  [address] [facility] [address]  Dept: [phone] Jennifer Presley is a 67 y.o. male Established patient, who presents to the walk-in clinic today with conditions/complaints as noted below:    Chief Complaint   Patient presents with    Congestion     Nasal congestion/drainage & dizziness for 2-3 days. HPI:     Patient is a 28-year-old male that presents today for head congestion and lightheadedness. States that he woke up yesterday morning feeling lightheaded and it's still present, but has improved. Denies recent head injury or illness. Denies vertigo, chest pain, or shortness of breath. Reports associated stuffy nose and sinus drainage. Notes that both shoulders seemed achy yesterday but are better now. He takes Zyrtec daily for allergies and started using Flonase yesterday as well. States that he started using a CPAP on Tuesday and is unsure if it's related. Denies any fevers, chills, ear pain, sore throat, or cough.       Past Medical History:   Diagnosis Date    Allergic rhinitis     Arthritis     BPH (benign prostatic hyperplasia)     Caffeine use     3 cups coffee/day    Chronic back pain     Diarrhea     ED (erectile dysfunction)     Esophageal stricture     GERD (gastroesophageal reflux disease)     Headache(784.0)     Hemorrhoids     Hyperlipemia     Rotator cuff tear, right 2012       Current Outpatient Medications   Medication Sig Dispense Refill    Boswellia-Glucosamine-Vit D (OSTEO BI-FLEX ONE PER DAY PO) Take 1 tablet by mouth daily      doxycycline hyclate (VIBRA-TABS) 100 MG tablet Take 1 tablet by mouth 2 times daily for 7 days 14 tablet 0    atorvastatin (LIPITOR) 40 MG tablet TAKE 1 TABLET DAILY 90 tablet 3    losartan (COZAAR) 50 MG tablet Take 1 tablet by mouth daily 90

## 2023-05-27 NOTE — PATIENT INSTRUCTIONS
Finish the entire course of antibiotic treatment. Continue with supportive treatment. Push hydration. Continue using Flonase daily and allergy pill. Follow-up with PCP.

## 2023-06-06 ENCOUNTER — OFFICE VISIT (OUTPATIENT)
Dept: FAMILY MEDICINE CLINIC | Age: 73
End: 2023-06-06
Payer: MEDICARE

## 2023-06-06 VITALS
BODY MASS INDEX: 29.16 KG/M2 | HEART RATE: 68 BPM | WEIGHT: 215 LBS | SYSTOLIC BLOOD PRESSURE: 138 MMHG | TEMPERATURE: 97.7 F | OXYGEN SATURATION: 98 % | DIASTOLIC BLOOD PRESSURE: 72 MMHG

## 2023-06-06 DIAGNOSIS — R73.09 ABNORMAL GLUCOSE: ICD-10-CM

## 2023-06-06 DIAGNOSIS — E78.2 MIXED HYPERLIPIDEMIA: ICD-10-CM

## 2023-06-06 DIAGNOSIS — F41.9 ANXIETY: ICD-10-CM

## 2023-06-06 DIAGNOSIS — I10 PRIMARY HYPERTENSION: Primary | ICD-10-CM

## 2023-06-06 DIAGNOSIS — Z12.5 SCREENING PSA (PROSTATE SPECIFIC ANTIGEN): ICD-10-CM

## 2023-06-06 DIAGNOSIS — G47.00 INSOMNIA, UNSPECIFIED TYPE: ICD-10-CM

## 2023-06-06 DIAGNOSIS — G47.30 SEVERE SLEEP APNEA: ICD-10-CM

## 2023-06-06 PROCEDURE — 3074F SYST BP LT 130 MM HG: CPT | Performed by: NURSE PRACTITIONER

## 2023-06-06 PROCEDURE — 1123F ACP DISCUSS/DSCN MKR DOCD: CPT | Performed by: NURSE PRACTITIONER

## 2023-06-06 PROCEDURE — 99214 OFFICE O/P EST MOD 30 MIN: CPT | Performed by: NURSE PRACTITIONER

## 2023-06-06 PROCEDURE — 3078F DIAST BP <80 MM HG: CPT | Performed by: NURSE PRACTITIONER

## 2023-06-06 RX ORDER — MIRTAZAPINE 7.5 MG/1
7.5 TABLET, FILM COATED ORAL NIGHTLY
Qty: 30 TABLET | Refills: 1 | Status: SHIPPED | OUTPATIENT
Start: 2023-06-06

## 2023-06-06 SDOH — ECONOMIC STABILITY: FOOD INSECURITY: WITHIN THE PAST 12 MONTHS, THE FOOD YOU BOUGHT JUST DIDN'T LAST AND YOU DIDN'T HAVE MONEY TO GET MORE.: NEVER TRUE

## 2023-06-06 SDOH — ECONOMIC STABILITY: FOOD INSECURITY: WITHIN THE PAST 12 MONTHS, YOU WORRIED THAT YOUR FOOD WOULD RUN OUT BEFORE YOU GOT MONEY TO BUY MORE.: NEVER TRUE

## 2023-06-06 SDOH — ECONOMIC STABILITY: INCOME INSECURITY: HOW HARD IS IT FOR YOU TO PAY FOR THE VERY BASICS LIKE FOOD, HOUSING, MEDICAL CARE, AND HEATING?: NOT HARD AT ALL

## 2023-06-06 ASSESSMENT — ENCOUNTER SYMPTOMS
SHORTNESS OF BREATH: 0
DIARRHEA: 0
NAUSEA: 0
ABDOMINAL PAIN: 0
ABDOMINAL DISTENTION: 0
COUGH: 0
RHINORRHEA: 0
VOMITING: 0
SORE THROAT: 0
CONSTIPATION: 0

## 2023-06-06 ASSESSMENT — ANXIETY QUESTIONNAIRES
IF YOU CHECKED OFF ANY PROBLEMS ON THIS QUESTIONNAIRE, HOW DIFFICULT HAVE THESE PROBLEMS MADE IT FOR YOU TO DO YOUR WORK, TAKE CARE OF THINGS AT HOME, OR GET ALONG WITH OTHER PEOPLE: NOT DIFFICULT AT ALL
2. NOT BEING ABLE TO STOP OR CONTROL WORRYING: 0
6. BECOMING EASILY ANNOYED OR IRRITABLE: 0
1. FEELING NERVOUS, ANXIOUS, OR ON EDGE: 1
3. WORRYING TOO MUCH ABOUT DIFFERENT THINGS: 0
GAD7 TOTAL SCORE: 1
5. BEING SO RESTLESS THAT IT IS HARD TO SIT STILL: 0
7. FEELING AFRAID AS IF SOMETHING AWFUL MIGHT HAPPEN: 0
4. TROUBLE RELAXING: 0

## 2023-06-06 ASSESSMENT — PATIENT HEALTH QUESTIONNAIRE - PHQ9
SUM OF ALL RESPONSES TO PHQ QUESTIONS 1-9: 0
SUM OF ALL RESPONSES TO PHQ QUESTIONS 1-9: 0
SUM OF ALL RESPONSES TO PHQ9 QUESTIONS 1 & 2: 0
SUM OF ALL RESPONSES TO PHQ QUESTIONS 1-9: 0
2. FEELING DOWN, DEPRESSED OR HOPELESS: 0
1. LITTLE INTEREST OR PLEASURE IN DOING THINGS: 0
SUM OF ALL RESPONSES TO PHQ QUESTIONS 1-9: 0

## 2023-06-07 ENCOUNTER — HOSPITAL ENCOUNTER (OUTPATIENT)
Age: 73
Setting detail: SPECIMEN
Discharge: HOME | End: 2023-06-07

## 2023-06-07 DIAGNOSIS — I10 PRIMARY HYPERTENSION: ICD-10-CM

## 2023-06-07 DIAGNOSIS — Z12.5 SCREENING PSA (PROSTATE SPECIFIC ANTIGEN): ICD-10-CM

## 2023-06-07 DIAGNOSIS — E78.2 MIXED HYPERLIPIDEMIA: ICD-10-CM

## 2023-06-07 DIAGNOSIS — R73.09 ABNORMAL GLUCOSE: ICD-10-CM

## 2023-06-07 LAB
ALBUMIN SERPL-MCNC: 4.3 G/DL (ref 3.5–5.2)
ALBUMIN/GLOB SERPL: 1.9 {RATIO} (ref 1–2.5)
ALP SERPL-CCNC: 99 U/L (ref 40–129)
ALT SERPL-CCNC: 31 U/L (ref 5–41)
ANION GAP SERPL CALCULATED.3IONS-SCNC: 17 MMOL/L (ref 9–17)
AST SERPL-CCNC: 30 U/L
BASOPHILS # BLD: 0.05 K/UL (ref 0–0.2)
BASOPHILS NFR BLD: 1 % (ref 0–2)
BILIRUB SERPL-MCNC: 0.7 MG/DL (ref 0.3–1.2)
BUN SERPL-MCNC: 11 MG/DL (ref 8–23)
CALCIUM SERPL-MCNC: 9.4 MG/DL (ref 8.6–10.4)
CHLORIDE SERPL-SCNC: 103 MMOL/L (ref 98–107)
CHOLEST SERPL-MCNC: 160 MG/DL
CHOLESTEROL/HDL RATIO: 2.5
CO2 SERPL-SCNC: 21 MMOL/L (ref 20–31)
CREAT SERPL-MCNC: 0.6 MG/DL (ref 0.7–1.2)
EOSINOPHIL # BLD: 0.21 K/UL (ref 0–0.44)
EOSINOPHILS RELATIVE PERCENT: 4 % (ref 1–4)
ERYTHROCYTE [DISTWIDTH] IN BLOOD BY AUTOMATED COUNT: 12 % (ref 11.8–14.4)
EST. AVERAGE GLUCOSE BLD GHB EST-MCNC: 108 MG/DL
GFR SERPL CREATININE-BSD FRML MDRD: >60 ML/MIN/1.73M2
GLUCOSE SERPL-MCNC: 97 MG/DL (ref 70–99)
HBA1C MFR BLD: 5.4 % (ref 4–6)
HCT VFR BLD AUTO: 43 % (ref 40.7–50.3)
HDLC SERPL-MCNC: 65 MG/DL
HGB BLD-MCNC: 14.5 G/DL (ref 13–17)
IMM GRANULOCYTES # BLD AUTO: <0.03 K/UL (ref 0–0.3)
IMM GRANULOCYTES NFR BLD: 0 %
LDLC SERPL CALC-MCNC: 76 MG/DL (ref 0–130)
LYMPHOCYTES # BLD: 31 % (ref 24–43)
LYMPHOCYTES NFR BLD: 1.67 K/UL (ref 1.1–3.7)
MCH RBC QN AUTO: 32.1 PG (ref 25.2–33.5)
MCHC RBC AUTO-ENTMCNC: 33.7 G/DL (ref 28.4–34.8)
MCV RBC AUTO: 95.1 FL (ref 82.6–102.9)
MONOCYTES NFR BLD: 0.58 K/UL (ref 0.1–1.2)
MONOCYTES NFR BLD: 11 % (ref 3–12)
NEUTROPHILS NFR BLD: 53 % (ref 36–65)
NEUTS SEG NFR BLD: 2.94 K/UL (ref 1.5–8.1)
NRBC AUTOMATED: 0 PER 100 WBC
PLATELET # BLD AUTO: 329 K/UL (ref 138–453)
PMV BLD AUTO: 9.3 FL (ref 8.1–13.5)
POTASSIUM SERPL-SCNC: 4.5 MMOL/L (ref 3.7–5.3)
PROT SERPL-MCNC: 6.6 G/DL (ref 6.4–8.3)
PSA SERPL-MCNC: 0.88 NG/ML
RBC # BLD AUTO: 4.52 M/UL (ref 4.21–5.77)
SODIUM SERPL-SCNC: 141 MMOL/L (ref 135–144)
TRIGL SERPL-MCNC: 97 MG/DL
WBC OTHER # BLD: 5.5 K/UL (ref 3.5–11.3)

## 2023-06-09 ENCOUNTER — TELEPHONE (OUTPATIENT)
Dept: FAMILY MEDICINE CLINIC | Age: 73
End: 2023-06-09

## 2023-06-09 DIAGNOSIS — G47.30 SEVERE SLEEP APNEA: Primary | ICD-10-CM

## 2023-06-09 NOTE — TELEPHONE ENCOUNTER
Fax from [facility] asking for pt to see pulmonary for tried and failed CPAP. Patient wearing full face mask but seen today for nasal mask- is not feeling like he is getting enough air, takes off after 4 hours, insurance requirement analisa and still having on average 14-20 apneas per hour. Pt to attempt nasal mask and get back to me to see if nasal resolves issue.      Fax scanned to chart

## 2023-06-14 ENCOUNTER — TELEPHONE (OUTPATIENT)
Dept: FAMILY MEDICINE CLINIC | Age: 73
End: 2023-06-14

## 2023-06-14 NOTE — TELEPHONE ENCOUNTER
Pt dropped off BP readings, scanned into chart    6/7 7:45 am 155/78  75  6/8 11:10 am 141/75  72  6/9 9:00 am 135/73  81  6/12 9:00 am 138/73  76  6/13 10:00 am 141/72  71  6/14 9:45 am 148/71  70

## 2023-06-21 NOTE — TELEPHONE ENCOUNTER
Bp seems to be staying the same. Continue medications as directed currently. Follow up with Dr. Chanelle Scott as scheduled.

## 2023-06-22 NOTE — TELEPHONE ENCOUNTER
Patient is aware of test results and instructions. Patient expressed understanding and denies any other concerns at this time.

## 2023-07-05 ENCOUNTER — OFFICE VISIT (OUTPATIENT)
Dept: PRIMARY CARE CLINIC | Age: 73
End: 2023-07-05
Payer: MEDICARE

## 2023-07-05 VITALS
DIASTOLIC BLOOD PRESSURE: 64 MMHG | HEART RATE: 74 BPM | SYSTOLIC BLOOD PRESSURE: 138 MMHG | OXYGEN SATURATION: 98 % | WEIGHT: 210 LBS | BODY MASS INDEX: 28.48 KG/M2 | TEMPERATURE: 98.4 F

## 2023-07-05 DIAGNOSIS — S39.012A ACUTE MYOFASCIAL STRAIN OF LUMBAR REGION, INITIAL ENCOUNTER: Primary | ICD-10-CM

## 2023-07-05 DIAGNOSIS — M25.552 ACUTE PAIN OF LEFT HIP: ICD-10-CM

## 2023-07-05 PROCEDURE — 99213 OFFICE O/P EST LOW 20 MIN: CPT

## 2023-07-05 PROCEDURE — 1123F ACP DISCUSS/DSCN MKR DOCD: CPT

## 2023-07-05 RX ORDER — TRIAMCINOLONE ACETONIDE 40 MG/ML
40 INJECTION, SUSPENSION INTRA-ARTICULAR; INTRAMUSCULAR ONCE
Status: COMPLETED | OUTPATIENT
Start: 2023-07-05 | End: 2023-07-05

## 2023-07-05 RX ORDER — KETOROLAC TROMETHAMINE 30 MG/ML
30 INJECTION, SOLUTION INTRAMUSCULAR; INTRAVENOUS ONCE
Status: COMPLETED | OUTPATIENT
Start: 2023-07-05 | End: 2023-07-05

## 2023-07-05 RX ADMIN — KETOROLAC TROMETHAMINE 30 MG: 30 INJECTION, SOLUTION INTRAMUSCULAR; INTRAVENOUS at 10:05

## 2023-07-05 RX ADMIN — TRIAMCINOLONE ACETONIDE 40 MG: 40 INJECTION, SUSPENSION INTRA-ARTICULAR; INTRAMUSCULAR at 10:06

## 2023-07-05 ASSESSMENT — ENCOUNTER SYMPTOMS
COLOR CHANGE: 0
COUGH: 0
ABDOMINAL PAIN: 0
SHORTNESS OF BREATH: 0
BACK PAIN: 1
VOMITING: 0

## 2023-07-05 NOTE — PATIENT INSTRUCTIONS
Continue with supportive treatment. Avoid aggravating activities/movements. Apply ice for acute relief. May try topical Salonpas patch. Continue using Motrin and Zanaflex nightly. Follow-up with PCP.

## 2023-08-04 DIAGNOSIS — F41.9 ANXIETY: ICD-10-CM

## 2023-08-04 DIAGNOSIS — G47.00 INSOMNIA, UNSPECIFIED TYPE: ICD-10-CM

## 2023-08-04 NOTE — TELEPHONE ENCOUNTER
LOV 6/6/23  LRF  6/6/23    Health Maintenance   Topic Date Due    Flu vaccine (1) 08/01/2023    Annual Wellness Visit (AWV)  07/27/2023    COVID-19 Vaccine (3 - Booster for Pfizer series) 06/06/2024 (Originally 4/29/2021)    Depression Screen  06/06/2024    Lipids  06/07/2024    Colorectal Cancer Screen  03/22/2027    DTaP/Tdap/Td vaccine (3 - Td or Tdap) 11/21/2029    Shingles vaccine  Completed    Pneumococcal 65+ years Vaccine  Completed    AAA screen  Completed    Hepatitis C screen  Completed    Hepatitis A vaccine  Aged Out    Hib vaccine  Aged Out    Meningococcal (ACWY) vaccine  Aged Out    A1C test (Diabetic or Prediabetic)  Discontinued             (applicable per patient's age: Cancer Screenings, Depression Screening, Fall Risk Screening, Immunizations)    Hemoglobin A1C (%)   Date Value   06/07/2023 5.4   01/19/2023 5.5   06/02/2022 6.1 (H)     LDL Cholesterol (mg/dL)   Date Value   06/07/2023 76     LDL Calculated (mg/dL)   Date Value   05/15/2014 115     AST (U/L)   Date Value   06/07/2023 30     ALT (U/L)   Date Value   06/07/2023 31     BUN (mg/dL)   Date Value   06/07/2023 11      (goal A1C is < 7)   (goal LDL is <100) need 30-50% reduction from baseline     BP Readings from Last 3 Encounters:   07/05/23 138/64   06/14/23 118/68   06/06/23 138/72    (goal /80)      All Future Testing planned in [facility]:  Lab Frequency Next Occurrence   Sleep Study with PAP Titration Once 01/26/2023       Next Visit Date:  Future Appointments   Date Time Provider [address]   8/17/2023  2:50 PM Belinda Olivares MD [facility]            Patient Active Problem List:     GERD (gastroesophageal reflux disease)     Hyperlipidemia     External hemorrhoids     Other male erectile dysfunction     Nocturia     Arthropathy     Benign prostatic hyperplasia with urinary obstruction     Allergic rhinitis     Rotator cuff arthropathy     Spinal stenosis of lumbar region without neurogenic claudication

## 2023-08-07 RX ORDER — MIRTAZAPINE 7.5 MG/1
7.5 TABLET, FILM COATED ORAL NIGHTLY
Qty: 90 TABLET | Refills: 0 | Status: SHIPPED | OUTPATIENT
Start: 2023-08-07

## 2023-08-14 ENCOUNTER — OFFICE VISIT (OUTPATIENT)
Dept: PRIMARY CARE CLINIC | Age: 73
End: 2023-08-14
Payer: MEDICARE

## 2023-08-14 VITALS
BODY MASS INDEX: 28.89 KG/M2 | WEIGHT: 213 LBS | HEART RATE: 76 BPM | DIASTOLIC BLOOD PRESSURE: 62 MMHG | OXYGEN SATURATION: 98 % | SYSTOLIC BLOOD PRESSURE: 136 MMHG | TEMPERATURE: 98.6 F
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
## 2023-08-14 DIAGNOSIS — S20.469A TICK BITE OF BACK WALL OF THORAX, UNSPECIFIED LOCATION, INITIAL ENCOUNTER: Primary | ICD-10-CM

## 2023-08-14 PROCEDURE — 99214 OFFICE O/P EST MOD 30 MIN: CPT | Performed by: FAMILY MEDICINE

## 2023-08-14 PROCEDURE — 1123F ACP DISCUSS/DSCN MKR DOCD: CPT | Performed by: FAMILY MEDICINE

## 2023-08-14 RX ORDER — DOXYCYCLINE HYCLATE 100 MG
100 TABLET ORAL 2 TIMES DAILY
Qty: 20 TABLET | Refills: 0 | Status: SHIPPED | OUTPATIENT
Start: 2023-08-14 | End: 2023-08-24

## 2023-08-17 ENCOUNTER — OFFICE VISIT (OUTPATIENT)
Age: 73
End: 2023-08-17
Payer: MEDICARE

## 2023-08-17 VITALS — HEIGHT: 72 IN | WEIGHT: 213 LBS | BODY MASS INDEX: 28.85 KG/M2

## 2023-08-17 DIAGNOSIS — N40.1 BENIGN PROSTATIC HYPERPLASIA WITH URINARY OBSTRUCTION: Primary | ICD-10-CM

## 2023-08-17 DIAGNOSIS — R35.1 NOCTURIA: ICD-10-CM

## 2023-08-17 DIAGNOSIS — N13.8 BENIGN PROSTATIC HYPERPLASIA WITH URINARY OBSTRUCTION: Primary | ICD-10-CM

## 2023-08-17 DIAGNOSIS — N52.8 OTHER MALE ERECTILE DYSFUNCTION: ICD-10-CM

## 2023-08-17 LAB
BILIRUBIN, POC: NORMAL
BLOOD URINE, POC: NORMAL
CLARITY, POC: CLEAR
COLOR, POC: YELLOW
GLUCOSE URINE, POC: NORMAL
KETONES, POC: NORMAL
LEUKOCYTE EST, POC: NORMAL
NITRITE, POC: NORMAL
PH, POC: NORMAL
PROTEIN, POC: NORMAL
SPECIFIC GRAVITY, POC: NORMAL
UROBILINOGEN, POC: NORMAL

## 2023-08-17 PROCEDURE — 1123F ACP DISCUSS/DSCN MKR DOCD: CPT | Performed by: SPECIALIST

## 2023-08-17 PROCEDURE — 99214 OFFICE O/P EST MOD 30 MIN: CPT | Performed by: SPECIALIST

## 2023-08-17 PROCEDURE — 81003 URINALYSIS AUTO W/O SCOPE: CPT | Performed by: SPECIALIST

## 2023-08-17 RX ORDER — TADALAFIL 20 MG/1
20 TABLET ORAL DAILY PRN
Qty: 30 TABLET | Refills: 11 | Status: SHIPPED | OUTPATIENT
Start: 2023-08-17

## 2023-08-17 RX ORDER — TAMSULOSIN HYDROCHLORIDE 0.4 MG/1
0.4 CAPSULE ORAL DAILY
Qty: 90 CAPSULE | Refills: 3 | Status: SHIPPED | OUTPATIENT
Start: 2023-08-17

## 2023-08-17 NOTE — PROGRESS NOTES
[facility], [address] [place] [address] Urology Office Progress Note    Patient:  Mary Ellen Hillman  YOB: 1950  Date: 8/17/2023    HISTORY OF PRESENT ILLNESS:   The patient is a 67 y.o. male  Patient's lower urinary tract symptoms are stable on Flomax/tamsulosin 0.4 mg po qd for BPH symptoms. Flomax/tamsulosin 0.4 mg po bid for BPH symptoms did not seem to help. Patient's symptoms not bothersome enough to warrant minimally invasive surgical Rx. Patient given an Rx for Tadalafil (Cialis) 20 mg prn ED. Lower urinary tract symptoms: urgency, frequency, hesitancy, decreased urinary stream, nocturia, 1 times per night, and  incomplete emptying   Last AUA Symptom Score (QOL): 20 (3)  Today's AUA Symptom Score (QOL): 20 (3)    Summary of old records:   \"[place]\"   BPH on tamsulosin 0.4 mg po qd (bid dosing didn't help) discussed Greenlight, Urolift. Avodart didn't help.  Cysto 5/09-lateral BPH  Nocturia x 2-3, tadalafil (Cialis) 5 mg po qd-didn't help   ED: Viagra-partial, HA; cialis 10-partial; Rx tadalafil (Cialis) 20 mg prn ED 8/17/23     Additional History: none    Procedures Today: N/A    Urinalysis today:  Results for POC orders placed in visit on 08/17/23   POCT Urinalysis No Micro (Auto)   Result Value Ref Range    Color, UA yellow     Clarity, UA clear     Glucose, UA POC neg     Bilirubin, UA      Ketones, UA      Spec Grav, UA      Blood, UA POC trace-intact     pH, UA      Protein, UA POC neg     Urobilinogen, UA      Leukocytes, UA neg     Nitrite, UA neg        Last several PSA's:  Lab Results   Component Value Date    PSA 0.88 06/07/2023    PSA 1.25 06/02/2022    PSA 0.98 06/01/2021       Last total testosterone:  No results found for: TESTOSTERONE    Last BUN and creatinine:  Lab Results   Component Value Date    BUN 11 06/07/2023     Lab Results   Component Value Date    CREATININE 0.60 (L) 06/07/2023       Last CBC:  Lab Results   Component Value Date    WBC 5.5

## 2023-09-13 ENCOUNTER — TELEPHONE (OUTPATIENT)
Dept: SURGERY | Age: 73
End: 2023-09-13

## 2023-09-13 NOTE — TELEPHONE ENCOUNTER
[facility]oned [facility] [place] for Carollynn Eisenmenger for a subaceous cyst lower back with Dr. Evelina Bright. An appointment was given for 9/20/2023 at 9:40am at [facility]. And the [facility] address was given to the caller. The [facility] stated they will let the patient know of the appt date and time. The [facility] authorization number is   [place] [id number]   Issue date of 9-7-2023 and expiration date of 9-.

## 2023-09-18 ENCOUNTER — OFFICE VISIT (OUTPATIENT)
Dept: PRIMARY CARE CLINIC | Age: 73
End: 2023-09-18
Payer: MEDICARE

## 2023-09-18 VITALS
WEIGHT: 213 LBS | DIASTOLIC BLOOD PRESSURE: 68 MMHG | TEMPERATURE: 99.2 F | OXYGEN SATURATION: 98 % | HEART RATE: 70 BPM | BODY MASS INDEX: 28.89 KG/M2 | SYSTOLIC BLOOD PRESSURE: 132 MMHG

## 2023-09-18 DIAGNOSIS — R10.11 RUQ PAIN: ICD-10-CM

## 2023-09-18 DIAGNOSIS — R10.13 EPIGASTRIC PAIN: Primary | ICD-10-CM

## 2023-09-18 PROCEDURE — 99213 OFFICE O/P EST LOW 20 MIN: CPT | Performed by: NURSE PRACTITIONER

## 2023-09-18 PROCEDURE — 1123F ACP DISCUSS/DSCN MKR DOCD: CPT | Performed by: NURSE PRACTITIONER

## 2023-09-18 RX ORDER — ACETAMINOPHEN 160 MG
TABLET,DISINTEGRATING ORAL
COMMUNITY

## 2023-09-18 RX ORDER — ZINC GLUCONATE 50 MG
50 TABLET ORAL DAILY
COMMUNITY

## 2023-09-18 ASSESSMENT — ENCOUNTER SYMPTOMS
COUGH: 0
BLOOD IN STOOL: 0
DIARRHEA: 0
SORE THROAT: 0
RHINORRHEA: 0
WHEEZING: 0
VOMITING: 0
CONSTIPATION: 0
SHORTNESS OF BREATH: 0
ABDOMINAL PAIN: 1
TROUBLE SWALLOWING: 0
NAUSEA: 0

## 2023-09-18 NOTE — PROGRESS NOTES
[facility] [place]  [address] [place] [id number]  Dept: [phone]    Josefa Rock is a 68 y.o. male Established patient, who presents to the walk-in clinic today with conditions/complaints as noted below:    Chief Complaint   Patient presents with    Abdominal Pain     Constant upper abdominal pain for 3 days. Denies fever, diarrhea, nausea, vomiting, sob, or chest pain. HPI:     Patient presents to walk in clinic with concerns about abdominal pains x3 days. Had loose stool this morning. No diarrhea or constipation. BP crazy yesterday. BP running 119/62- 184/85. Lowest reading after having 2 alcoholic drinks before bed. /80 and 174/95 during day. Feels like he may be belching a little more. Has Gaviscon at home but didn't try it. Did have a sharp pain to right shoulder blade yesterday. Abdominal Pain  This is a new problem. The current episode started in the past 7 days. The pain is located in the epigastric region. The pain is at a severity of 5/10. The quality of the pain is aching. Pertinent negatives include no constipation, diarrhea, dysuria, fever, frequency, hematuria, nausea or vomiting. Nothing aggravates the pain. The pain is relieved by Nothing. He has tried nothing for the symptoms. Past Medical History:   Diagnosis Date    Allergic rhinitis     Arthritis     BPH (benign prostatic hyperplasia)     Caffeine use     3 cups coffee/day    Chronic back pain     Diarrhea     ED (erectile dysfunction)     Esophageal stricture     GERD (gastroesophageal reflux disease)     Headache(784.0)     Hemorrhoids     Hyperlipemia     Rotator cuff tear, right 2012       Current Outpatient Medications   Medication Sig Dispense Refill    Misc.  Devices (CPAP MACHINE) MISC use as directed for 1 year      Cholecalciferol (VITAMIN D3) 50 MCG (2000 UT) CAPS Take by mouth      zinc 50 MG TABS tablet Take 1 tablet by mouth

## 2023-09-19 ENCOUNTER — HOSPITAL ENCOUNTER (OUTPATIENT)
Dept: ULTRASOUND IMAGING | Age: 73
Discharge: HOME | End: 2023-09-21
Payer: MEDICARE

## 2023-09-19 DIAGNOSIS — R10.11 RUQ PAIN: ICD-10-CM

## 2023-09-19 DIAGNOSIS — R10.13 EPIGASTRIC PAIN: ICD-10-CM

## 2023-09-19 PROCEDURE — 76705 ECHO EXAM OF ABDOMEN: CPT

## 2023-09-20 ENCOUNTER — INITIAL CONSULT (OUTPATIENT)
Age: 73
End: 2023-09-20
Payer: OTHER GOVERNMENT

## 2023-09-20 VITALS
BODY MASS INDEX: 29.12 KG/M2 | DIASTOLIC BLOOD PRESSURE: 78 MMHG | HEART RATE: 80 BPM | SYSTOLIC BLOOD PRESSURE: 126 MMHG | HEIGHT: 72 IN | WEIGHT: 215 LBS | RESPIRATION RATE: 16 BRPM

## 2023-09-20 DIAGNOSIS — R22.2 SUBCUTANEOUS MASS OF BACK: Primary | ICD-10-CM

## 2023-09-20 PROCEDURE — 99203 OFFICE O/P NEW LOW 30 MIN: CPT | Performed by: SURGERY

## 2023-09-20 PROCEDURE — 1123F ACP DISCUSS/DSCN MKR DOCD: CPT | Performed by: SURGERY

## 2023-09-20 NOTE — ASSESSMENT & PLAN NOTE
Based on history and exam this seems most consistent with either epidermal inclusion cyst or a dermal cyst.  Had a discussion with the patient about the most likely diagnosis. Have discussed with him that this is not dangerous lesion and is not going to lead to any bigger problems. He is really completely asymptomatic is not causing him any problems right now. As such I discussed with him management options including surgical excision versus just conservative watchful waiting. As he states that the last time it was excised there was a lot of difficulty with probably plan to do this in the OR under a light anesthetic to ensure that we get a full excision of the cyst capsule. After discussion the patient would just like to continue to monitor. Discussed with him that should it begin to cause him any symptoms, if he decides he would like to have it removed or if it gets bigger we can always revisit plan to excise at that time. Otherwise continue regular follow-up with PCP.

## 2023-09-20 NOTE — PROGRESS NOTES
Subjective   Thom Pope is a 68 y.o. male who presents today for further evaluation of a lump on the left mid back. Patient reports that he has had a lump under the skin in that area for several years. States that may be between 5 and 10 years ago had it removed by a Dr Neeta Castellon in [facility]. He does report that was done in office but the surgeon said that he \"had a lot of trouble getting it out\". Denies any problems after the procedure and was told that it was benign and needed no further work-up or treatment. Seem to have resolved for a couple years but he states that about 2 to 3 years ago he began to notice a lump in the exact same area. Since that time he states that it has probably gotten slightly larger in size though it still smaller than what he originally had. Denies any pain in the area. No redness no swelling no drainage from the skin. Was recently seen by his PCP and was referred to general surgery to discuss further. Past Medical History:   Diagnosis Date    Allergic rhinitis     Arthritis     BPH (benign prostatic hyperplasia)     Caffeine use     3 cups coffee/day    Chronic back pain     Diarrhea     ED (erectile dysfunction)     Esophageal stricture     GERD (gastroesophageal reflux disease)     Headache(784.0)     Hemorrhoids     Hyperlipemia     Rotator cuff tear, right 2012       Past Surgical History:   Procedure Laterality Date    APPENDECTOMY      CARPAL TUNNEL RELEASE Right 02/2018    COLONOSCOPY      CYSTOSCOPY  5/4/09    ESOPHAGEAL DILATATION      HAND SURGERY      HERNIA REPAIR      LUMBAR LAMINECTOMY  10/17/2019    Dr Forrest Faustin Left 06/25/2018    hip    UPPER GASTROINTESTINAL ENDOSCOPY         Current Outpatient Medications   Medication Sig Dispense Refill    Misc.  Devices (CPAP MACHINE) MISC use as directed for 1 year      Cholecalciferol (VITAMIN D3) 50 MCG (2000 UT) CAPS Take by mouth      zinc 50

## 2023-09-25 DIAGNOSIS — M62.838 MUSCLE SPASM: ICD-10-CM

## 2023-09-25 NOTE — TELEPHONE ENCOUNTER
LOV [id number]   RTO 09/28/23  LRF 02/15/23          Controlled Substance Monitoring:    Acute and Chronic Pain Monitoring:   RX Monitoring Acute Pain Prescriptions Periodic Controlled Substance Monitoring   6/25/2019   4:53 PM Prescription exceeds daily limit for a specific reason. See comments or note. No signs of potential drug abuse or diversion identified.

## 2023-09-26 RX ORDER — TIZANIDINE 4 MG/1
4 TABLET ORAL EVERY EVENING
Qty: 7 TABLET | Refills: 0 | Status: SHIPPED | OUTPATIENT
Start: 2023-09-26 | End: 2023-09-28 | Stop reason: ALTCHOICE

## 2023-09-28 ENCOUNTER — OFFICE VISIT (OUTPATIENT)
Dept: FAMILY MEDICINE CLINIC | Age: 73
End: 2023-09-28
Payer: MEDICARE

## 2023-09-28 VITALS
HEIGHT: 72 IN | TEMPERATURE: 97.9 F | SYSTOLIC BLOOD PRESSURE: 138 MMHG | OXYGEN SATURATION: 99 % | BODY MASS INDEX: 29.12 KG/M2 | DIASTOLIC BLOOD PRESSURE: 78 MMHG | HEART RATE: 68 BPM | WEIGHT: 215 LBS

## 2023-09-28 DIAGNOSIS — Z23 FLU VACCINE NEED: ICD-10-CM

## 2023-09-28 DIAGNOSIS — I10 PRIMARY HYPERTENSION: ICD-10-CM

## 2023-09-28 DIAGNOSIS — Z00.00 MEDICARE ANNUAL WELLNESS VISIT, SUBSEQUENT: Primary | ICD-10-CM

## 2023-09-28 PROCEDURE — 3078F DIAST BP <80 MM HG: CPT

## 2023-09-28 PROCEDURE — 3075F SYST BP GE 130 - 139MM HG: CPT

## 2023-09-28 PROCEDURE — 90694 VACC AIIV4 NO PRSRV 0.5ML IM: CPT

## 2023-09-28 PROCEDURE — G0008 ADMIN INFLUENZA VIRUS VAC: HCPCS

## 2023-09-28 PROCEDURE — 1123F ACP DISCUSS/DSCN MKR DOCD: CPT

## 2023-09-28 PROCEDURE — G0439 PPPS, SUBSEQ VISIT: HCPCS

## 2023-09-28 RX ORDER — LOSARTAN POTASSIUM 100 MG/1
100 TABLET ORAL DAILY
Qty: 90 TABLET | Refills: 1 | Status: SHIPPED | OUTPATIENT
Start: 2023-09-28

## 2023-09-28 RX ORDER — CARVEDILOL 6.25 MG/1
6.25 TABLET ORAL 2 TIMES DAILY
Qty: 180 TABLET | Refills: 1 | Status: SHIPPED | OUTPATIENT
Start: 2023-09-28 | End: 2024-09-27

## 2023-09-28 ASSESSMENT — ANXIETY QUESTIONNAIRES
5. BEING SO RESTLESS THAT IT IS HARD TO SIT STILL: 0
IF YOU CHECKED OFF ANY PROBLEMS ON THIS QUESTIONNAIRE, HOW DIFFICULT HAVE THESE PROBLEMS MADE IT FOR YOU TO DO YOUR WORK, TAKE CARE OF THINGS AT HOME, OR GET ALONG WITH OTHER PEOPLE: NOT DIFFICULT AT ALL
6. BECOMING EASILY ANNOYED OR IRRITABLE: 0
7. FEELING AFRAID AS IF SOMETHING AWFUL MIGHT HAPPEN: 0
2. NOT BEING ABLE TO STOP OR CONTROL WORRYING: 0
3. WORRYING TOO MUCH ABOUT DIFFERENT THINGS: 0
1. FEELING NERVOUS, ANXIOUS, OR ON EDGE: 1
GAD7 TOTAL SCORE: 1
4. TROUBLE RELAXING: 0

## 2023-09-28 ASSESSMENT — LIFESTYLE VARIABLES
HOW OFTEN DO YOU HAVE A DRINK CONTAINING ALCOHOL: 4 OR MORE TIMES A WEEK
HOW MANY STANDARD DRINKS CONTAINING ALCOHOL DO YOU HAVE ON A TYPICAL DAY: 3 OR 4
HOW OFTEN DURING THE LAST YEAR HAVE YOU HAD A FEELING OF GUILT OR REMORSE AFTER DRINKING: 0
HOW OFTEN DURING THE LAST YEAR HAVE YOU FAILED TO DO WHAT WAS NORMALLY EXPECTED FROM YOU BECAUSE OF DRINKING: 0
HOW OFTEN DURING THE LAST YEAR HAVE YOU BEEN UNABLE TO REMEMBER WHAT HAPPENED THE NIGHT BEFORE BECAUSE YOU HAD BEEN DRINKING: 0
HAVE YOU OR SOMEONE ELSE BEEN INJURED AS A RESULT OF YOUR DRINKING: 0
HOW OFTEN DURING THE LAST YEAR HAVE YOU NEEDED AN ALCOHOLIC DRINK FIRST THING IN THE MORNING TO GET YOURSELF GOING AFTER A NIGHT OF HEAVY DRINKING: 0
HAS A RELATIVE, FRIEND, DOCTOR, OR ANOTHER HEALTH PROFESSIONAL EXPRESSED CONCERN ABOUT YOUR DRINKING OR SUGGESTED YOU CUT DOWN: 0
HOW OFTEN DURING THE LAST YEAR HAVE YOU FOUND THAT YOU WERE NOT ABLE TO STOP DRINKING ONCE YOU HAD STARTED: 0

## 2023-09-28 ASSESSMENT — PATIENT HEALTH QUESTIONNAIRE - PHQ9
SUM OF ALL RESPONSES TO PHQ QUESTIONS 1-9: 0
2. FEELING DOWN, DEPRESSED OR HOPELESS: 0
1. LITTLE INTEREST OR PLEASURE IN DOING THINGS: 0
SUM OF ALL RESPONSES TO PHQ QUESTIONS 1-9: 0
SUM OF ALL RESPONSES TO PHQ9 QUESTIONS 1 & 2: 0

## 2023-09-28 NOTE — PROGRESS NOTES
Medicare Annual Wellness Visit    Mary Ellen Hillmna is here for [employer] and Medicare AWV    Assessment & Plan   Medicare annual wellness visit, subsequent  Recommendations for Preventive Services Due: see orders and patient instructions/AVS.  Recommended screening schedule for the next 5-10 years is provided to the patient in written form: see Patient Instructions/AVS.     No follow-ups on file. Subjective       Mary Ellen Hillman is a 68 y.o. male who presents to [facility] care. Previous patient of Joshua Farrar NP. Labs reviewed from June  Saw [address] and they want him to get hearing test believes he was started on BP treatment. [facility]. [facility] and seeing dermatology  Does check BP at home  Urologist-for BPH  Sees Gastro when he has acid issues-has had esophagus dilated. Eye-goes yearly. Dentist-twice a year    Gabapentin and muscle relaxer-doesn't know specifically why on. Has pain all over body. Has been taking muscle relaxer with alcohol. Will discontinue today. Remeron-for anxiety related to having CPAP    Patient's complete Health Risk Assessment and screening values have been reviewed and are found in Flowsheets. The following problems were reviewed today and where indicated follow up appointments were made and/or referrals ordered. Positive Risk Factor Screenings with Interventions:         Alcohol Screening:  Alcohol Use: Heavy Drinker (9/28/2023)    AUDIT-C     Frequency of Alcohol Consumption: 4 or more times a week     Average Number of Drinks: 3 or 4     Frequency of Binge Drinking: Less than monthly      AUDIT-C Score: 6   AUDIT Total Score: 6    Interpretation of AUDIT-C score:   3-7 indicates potential alcohol risk. 8 or more is associated with harmful or hazardous drinking. 15 or more in women, and 15 or more in men, is likely to indicate alcohol dependence.   Interventions:  Patient comments: No desire to quit    Alcohol Misuse
(See Comments)     Other reaction(s): Rash  Pt does not recall the reaction        PHYSICAL EXAM:      There were no vitals taken for this visit. Physical Exam     RESULTS:      No results found for this visit on 09/28/23. Office Visit on 08/17/2023   Component Date Value Ref Range Status    Color, UA 08/17/2023 yellow   Final    Clarity, UA 08/17/2023 clear   Final    Glucose, UA POC 08/17/2023 neg   Final    Blood, UA POC 08/17/2023 trace-intact   Final    Protein, UA POC 08/17/2023 neg   Final    Leukocytes, UA 08/17/2023 neg   Final    Nitrite, UA 08/17/2023 neg   Final   Hospital Outpatient Visit on 06/07/2023   Component Date Value Ref Range Status    Cholesterol, Fasting 06/07/2023 160  <200 mg/dL Final    Comment:    Cholesterol Guidelines:      <200  Desirable   200-240  Borderline      >240  Undesirable         HDL 06/07/2023 65  >40 mg/dL Final    Comment:    HDL Guidelines:    <40     Undesirable   40-59    Borderline    >59     Desirable         LDL Cholesterol 06/07/2023 76  0 - 130 mg/dL Final    Comment:    LDL Guidelines:     <100    Desirable   100-129   Near to/above Desirable   130-159   Borderline      >159   Undesirable     Direct (measured) LDL and calculated LDL are not interchangeable tests. Chol/HDL Ratio 06/07/2023 2.5  <5 Final            Triglyceride, Fasting 06/07/2023 97  <150 mg/dL Final    Comment:    Triglyceride Guidelines:     <150   Desirable   150-199  Borderline   200-499  High     >499   Very high   Based on AHA Guidelines for fasting triglyceride, October 2012. Hemoglobin A1C 06/07/2023 5.4  4.0 - 6.0 % Final    Estimated Avg Glucose 06/07/2023 108  mg/dL Final    Comment: The ADA and AACC recommend providing the estimated average glucose result to permit better   patient understanding of their HBA1c result.       Glucose 06/07/2023 97  70 - 99 mg/dL Final    BUN 06/07/2023 11  8 - 23 mg/dL Final    Creatinine 06/07/2023 0.60 (L)  0.70 - 1.20 mg/dL Final

## 2023-10-01 ENCOUNTER — HOSPITAL ENCOUNTER (EMERGENCY)
Facility: CLINIC | Age: 73
Discharge: HOME | End: 2023-10-01
Attending: EMERGENCY MEDICINE
Payer: OTHER GOVERNMENT

## 2023-10-01 VITALS
BODY MASS INDEX: 28.44 KG/M2 | RESPIRATION RATE: 16 BRPM | HEIGHT: 72 IN | WEIGHT: 210 LBS | HEART RATE: 81 BPM | OXYGEN SATURATION: 96 % | DIASTOLIC BLOOD PRESSURE: 95 MMHG | SYSTOLIC BLOOD PRESSURE: 168 MMHG

## 2023-10-01 DIAGNOSIS — L02.91 ABSCESS: Primary | ICD-10-CM

## 2023-10-01 PROCEDURE — 6360000002 HC RX W HCPCS

## 2023-10-01 PROCEDURE — 6370000000 HC RX 637 (ALT 250 FOR IP)

## 2023-10-01 PROCEDURE — 99284 EMERGENCY DEPT VISIT MOD MDM: CPT

## 2023-10-01 PROCEDURE — 10060 I&D ABSCESS SIMPLE/SINGLE: CPT

## 2023-10-01 PROCEDURE — 96372 THER/PROPH/DIAG INJ SC/IM: CPT

## 2023-10-01 RX ORDER — CEPHALEXIN 500 MG/1
500 CAPSULE ORAL 3 TIMES DAILY
Qty: 21 CAPSULE | Refills: 0 | Status: SHIPPED | OUTPATIENT
Start: 2023-10-01 | End: 2023-10-08

## 2023-10-01 RX ORDER — KETOROLAC TROMETHAMINE 30 MG/ML
30 INJECTION, SOLUTION INTRAMUSCULAR; INTRAVENOUS ONCE
Status: COMPLETED | OUTPATIENT
Start: 2023-10-01 | End: 2023-10-01

## 2023-10-01 RX ORDER — CEPHALEXIN 500 MG/1
500 CAPSULE ORAL ONCE
Status: COMPLETED | OUTPATIENT
Start: 2023-10-01 | End: 2023-10-01

## 2023-10-01 RX ADMIN — CEPHALEXIN 500 MG: 500 CAPSULE ORAL at 13:18

## 2023-10-01 RX ADMIN — KETOROLAC TROMETHAMINE 30 MG: 30 INJECTION, SOLUTION INTRAMUSCULAR; INTRAVENOUS at 13:17

## 2023-10-01 ASSESSMENT — PAIN DESCRIPTION - LOCATION: LOCATION: BACK

## 2023-10-01 ASSESSMENT — PAIN - FUNCTIONAL ASSESSMENT: PAIN_FUNCTIONAL_ASSESSMENT: 0-10

## 2023-10-01 ASSESSMENT — ENCOUNTER SYMPTOMS
BACK PAIN: 0
SHORTNESS OF BREATH: 0
COLOR CHANGE: 0
VOMITING: 0
NAUSEA: 0

## 2023-10-01 ASSESSMENT — PAIN SCALES - GENERAL: PAINLEVEL_OUTOF10: 5

## 2023-10-01 NOTE — ED PROVIDER NOTES
I was present in the ED during this patient's evaluation and management by the Advance Practice Provider and was available to address any concerns about their medical management.     Esmer Murray MD  Attending, Emergency Department       Milad Valdez MD  10/01/23 1430

## 2023-10-01 NOTE — DISCHARGE INSTRUCTIONS
You were seen today for an abscess, which is a local collection of puss. This was drained while you were in the Emergency Department. Please apply warm compresses to the area for 10-15 minutes at a time to help promote drainage. Remove the packing in the shower in 2-3 days, it may fall out on its own before then and that is okay. You can cover with clean gauze as desired. Do no apply any creams or ointments. You were started on cephalexin, which is an antibiotic. You were given the first dose in the ED. Please fill your prescription and take as prescribed for the entire course. Please make sure to drink plenty of water and avoid alcohol while on this medication. Javier Butterfield has some antibiotics for free; Lino Centeno has a 4 dollar prescription plan for some antibiotics. You can use Ibuprofen or Tylenol as needed for pain and discomfort    Please follow-up at your primary care provider or dermatology for a wound check in 2-3 days. Return to the Emergency Department for redness around wound, fever > 101.5, excessive nausea or vomiting, any other care or concern. PLEASE RETURN TO THE EMERGENCY DEPARTMENT IMMEDIATELY if your symptoms worsen in anyway or in 8-12 hours if not improved for re-evaluation. You should immediately return to the ER for symptoms such as increasing pain, bloody stool, fever, a feeling of passing out, light headed, dizziness, chest pain, shortness of breath, persistent nausea and/or vomiting, numbness or weakness to the arms or legs, coolness or color change of the arms or legs. Take your medication as indicated and prescribed. If you are given an antibiotic then, make sure you get the prescription filled and take the antibiotics until finished.       [address]!!!    From [facility] and [address] [place] [address] Emergency Services    On behalf of the Emergency Department staff at [facility]), I would like to thank you for giving us the opportunity to

## 2023-10-01 NOTE — ED PROVIDER NOTES
[facility]  [address]  Phone: [phone]        Pt Name: Analia Knapp  MRN: [id number]  [address] 1950  Date of evaluation: 10/1/23    CHIEFCOMPLAINT       Chief Complaint   Patient presents with    Abscess     Reports abscess on back        HISTORY OF PRESENT ILLNESS (Location/Symptom, Timing/Onset, Context/Setting, Quality, Duration, Modifying Factors, Severity)      Analia Knapp is a 68 y.o. male with no pertinent PMH who presents to the ED via private auto with complaint of an abscess to his back, states he felt a getting sore approximately 1 week ago. Denies any bleeding or drainage at home. .  Patient states he has had it for years and had his PCP attempt to remove it in office approximately 3 years ago however it did come back. Patient states he had a skin biopsy with the [place] dermatology recently however the cyst did not look inflamed at that time. The patient denies any pain medication taken prior to arrival, rating the pain a 5 out of 10 if the cyst comes into contact with anything. Patient denies fever or chills, nausea or vomiting, chest pain or shortness of breath. No recent antibiotic use. Patient requesting [facility] dermatology referral.    PAST MEDICAL / SURGICAL / SOCIAL / FAMILY HISTORY     PMH:  has a past medical history of Allergic rhinitis, Arthritis, BPH (benign prostatic hyperplasia), Caffeine use, Chronic back pain, Diarrhea, ED (erectile dysfunction), Esophageal stricture, GERD (gastroesophageal reflux disease), Headache(784.0), Hemorrhoids, Hyperlipemia, and Rotator cuff tear, right. Surgical History:  has a past surgical history that includes Esophagus dilation; Upper gastrointestinal endoscopy; Cystocopy (5/4/09); hernia repair; Appendectomy; Colonoscopy; Hand surgery; Rotator cuff repair; Toe Surgery; Total hip arthroplasty (Left, 06/25/2018);  Carpal tunnel release (Right, 02/2018); and lumbar laminectomy malignant underlying process, but the patient also understands that early in the process of an illness or injury, an emergency department workup can be falsely reassuring. Routine discharge counseling was given, and the patient understands that worsening, changing or persistent symptoms should prompt an immediate call or follow up with their primary physician or return to the emergency department. I have reviewed the disposition diagnosis with the patient. I have answered their questions and given discharge instructions. They voiced understanding of these instructions and did not have any further questions or complaints. This patient was seen by the attending physician and they agreed with the assessment and plan.     CONSULTS:  None    PROCEDURES:  Incision/Drainage    Date/Time: 10/1/2023 1:13 PM    Performed by: DARIA Hodges CNP  Authorized by: Antonio Long MD    Consent:     Consent obtained:  Verbal    Consent given by:  Patient    Risks, benefits, and alternatives were discussed: yes      Risks discussed:  Incomplete drainage, pain, infection, damage to other organs and bleeding    Alternatives discussed:  No treatment  Universal protocol:     Procedure explained and questions answered to patient or proxy's satisfaction: yes      Relevant documents present and verified: yes      Test results available : yes      Required blood products, implants, devices, and special equipment available: yes      Site/side marked: yes      Immediately prior to procedure, a time out was called: yes      Patient identity confirmed:  Verbally with patient and arm band  Location:     Type:  Abscess    Location:  Trunk    Trunk location:  Back  Pre-procedure details:     Skin preparation:  Chlorhexidine  Sedation:     Sedation type:  None  Anesthesia:     Anesthesia method:  Local infiltration    Local anesthetic:  Lidocaine 1% w/o epi and bupivacaine 0.5% w/o epi  Procedure type:     Complexity:

## 2023-10-03 NOTE — PROGRESS NOTES
Contacted patient to rescheduled BP check for this week. Confirmed identifiers x2. Pt agreeable to reschedule appointment for tomorrow at 1330. Pt provided BPs from last evening 180/105, 185/111, 184/106. Pt denies HA, visual disturbances. Pt stated her pain had increased at that time and she did not take tylenol as she was not able to pick it up from the pharmacy. Pt reports she did take Labetalol, Nifedipine and Hydralazine last night. Pt reports she is due to take labetalol, nifedipine, hydralazine, Imdur and Losartan this AM. Pt unsure if she is willing to be evaluated in the hospital today. Requested pt take medications now and take BP in 1 hour. Pt agreeable.    Additionally pt asking if her anxiety medication has any interactions with her BP meds. She did not take her anxiety medication last night due to her concerns. Will discuss with MD to confirm.    Rescheduled pt appointment. Pt asked if it is okay to bring her daughter to the appointment. Informed pt it is ok BUT pt may need another adult in the event she is required to go to hospital for evaluation. Pt verbalized understanding.   Medicare Annual Wellness Visit  Name: Kaycee Vidal Date: 2020   MRN: [id number] Sex: Male   Age: 71 y.o. Ethnicity: Non-/Non    : 1950 Race: White      Naomie Dempsey is here for Medicare AWV    Screenings for behavioral, psychosocial and functional/safety risks, and cognitive dysfunction are all negative except as indicated below. These results, as well as other patient data from the [address] form, are documented in Flowsheets linked to this Encounter. Allergies   Allergen Reactions    Penicillins Other (See Comments)     Other reaction(s): Rash  Pt does not recall the reaction        Prior to Visit Medications    Medication Sig Taking?  Authorizing Provider   omeprazole (PRILOSEC) 20 MG delayed release capsule Take 1 capsule by mouth Daily Yes DARIA Dias CNP   atorvastatin (LIPITOR) 10 MG tablet Take 1 tablet by mouth daily Yes DARIA Dias CNP   Misc Natural Products Xander Oelrichs) CAPS Take by mouth Yes Historical Provider, MD   tiZANidine (ZANAFLEX) 4 MG tablet Take 1 tablet by mouth every 8 hours as needed (muscle spasms) Yes DARIA Dias CNP   tamsulosin (FLOMAX) 0.4 MG capsule TAKE 1 CAPSULE DAILY Yes Srinath Chowdhury MD   fluticasone (VERAMYST) 27.5 MCG/SPRAY nasal spray 2 sprays by Each Nostril route daily Yes Historical Provider, MD   tadalafil (CIALIS) 5 MG tablet Take 1 tablet by mouth daily Yes Srinath Chowdhury MD   Saccharomyces boulardii (PROBIOTIC) 250 MG CAPS Take 1 capsule by mouth daily Yes Historical Provider, MD   Turmeric POWD Take by mouth daily 1 scoop Yes Historical Provider, MD   FIBER PO Take 3 tablets by mouth daily  Yes Historical Provider, MD   Garlic 7201 MG CAPS Take 1 capsule by mouth 3 times daily (with meals)  Yes Historical Provider, MD   Omega-3 Fatty Acids (OMEGA 3 PO) Take by mouth  Historical Provider, MD   cetirizine (ZYRTEC) 5 MG tablet Take 10 mg by mouth daily Historical Provider, MD       Past Medical History:   Diagnosis Date    Allergic rhinitis     Arthritis     BPH (benign prostatic hyperplasia)     Caffeine use     3 cups coffee/day    Chronic back pain     Diarrhea     ED (erectile dysfunction)     Esophageal stricture     GERD (gastroesophageal reflux disease)     Headache(784.0)     Hemorrhoids     Hyperlipemia     Rotator cuff tear, right        Past Surgical History:   Procedure Laterality Date    APPENDECTOMY      CARPAL TUNNEL RELEASE Right 2018    COLONOSCOPY      CYSTOSCOPY  09    ESOPHAGEAL DILATATION      HAND SURGERY      HERNIA REPAIR      LUMBAR LAMINECTOMY  10/17/2019    Dr Christianson Corbin TOTAL HIP ARTHROPLASTY Left 2018    hip    UPPER GASTROINTESTINAL ENDOSCOPY         Family History   Problem Relation Age of Onset    Hypertension Mother             Cancer Father                CareTeam (Including outside providers/suppliers regularly involved in providing care):   Patient Care Team:  DARIA Marie CNP as PCP - General (Family Nurse Practitioner)  DARIA Marie CNP as PCP - [facility] Empaneled Provider  Antony Darling MD as Consulting Physician (Urology)  Tova Murrieta as Consulting Physician (Ophthalmology)  Jhon Page MD (Gastroenterology)  Roverto Farr MD as Consulting Physician (Orthopedic Surgery)    Wt Readings from Last 3 Encounters:   20 221 lb (100.2 kg)   20 218 lb (98.9 kg)   19 216 lb (98 kg)     Vitals:    20 0904 20 0921   BP: (!) 140/65 (!) 140/63   Site: Right Upper Arm    Position: Sitting    Cuff Size: Medium Adult    Pulse: 78    SpO2: 96%    Weight: 221 lb (100.2 kg)    Height: 6' (1.829 m)      Body mass index is 29.97 kg/m². Based upon direct observation of the patient, evaluation of cognition reveals recent and remote memory intact.     General Appearance: alert and oriented to person, place and time  Skin: warm and dry  Neck: neck supple and non tender without mass   Pulmonary/Chest: clear to auscultation bilaterally- no wheezes, rales or rhonchi, normal air movement, no respiratory distress  Cardiovascular: normal rate and regular rhythm     Josetrupti New presents to the office today for a Medicare annual well visit. Lots of aches and pains. Arthritis. Right knee done by Dr. Braynt Rasgdale. Right shoulder pain. Low back pain since back surgery. We have discussed in the past to try gabapentin. We will set up some gabapentin he will follow-up to discuss this. Patient's complete Health Risk Assessment and screening values have been reviewed and are found in Flowsheets. The following problems were reviewed today and where indicated follow up appointments were made and/or referrals ordered. Positive Risk Factor Screenings with Interventions:     General Health:  General  In general, how would you say your health is?: Fair  In the past 7 days, have you experienced any of the following? New or Increased Pain, New or Increased Fatigue, Loneliness, Social Isolation, Stress or Anger?: (!) New or Increased Pain  Do you get the social and emotional support that you need?: Yes  Do you have a Living Will?: Yes  General Health Risk Interventions:  · Pain issues: patient advised to follow-up in this office for further evaluation and treatment within 30 days day(s)    Hearing/Vision:  No exam data present  Hearing/Vision  Do you or your family notice any trouble with your hearing?: (!) Yes  Do you have difficulty driving, watching TV, or doing any of your daily activities because of your eyesight?: No  Have you had an eye exam within the past year?: Yes  Hearing/Vision Interventions:  · Hearing concerns:  patient declines any further evaluation/treatment for hearing issues   · Has hearing aids. Got them from [place] a few years ago. Will call to get adjusted.     Safety:  Safety  Do you have working smoke detectors?: Yes  Have all throw rugs been removed or fastened?: (!) No  Do you have non-slip mats or surfaces in all bathtubs/showers?: Yes  Do all of your stairways have a railing or banister?: (!) No  Are your doorways, halls and stairs free of clutter?: Yes  Do you always fasten your seatbelt when you are in a car?: Yes  Safety Interventions:  · Home safety tips provided   · Advised to get rid of throw rugs. · 2 steps in the house. No other steps. No grab bars in bathroom. · Showers sitting down. Has a chair. Personalized Preventive Plan   Current Health Maintenance Status  Immunization History   Administered Date(s) Administered    Influenza 10/24/2012, 10/09/2013    Influenza Virus Vaccine 08/27/2010, 10/10/2014, 09/21/2015    Influenza, Jose Scrape, IM, (6 mo and older Fluzone, Flulaval, Fluarix and 3 yrs and older Afluria) 10/21/2016, 10/24/2017, 10/24/2018    Influenza, Quadv, IM, PF (6 mo and older Fluzone, Flulaval, Fluarix, and 3 yrs and older Afluria) 10/05/2019    Pneumococcal Conjugate 13-valent ([id number]) 04/27/2016    Pneumococcal Polysaccharide ([id number]) 10/28/2003, 05/15/2017    Tdap (Boostrix, Adacel) 11/23/2009, 11/21/2019    Zoster Recombinant (Shingrix) 07/17/2019, 12/02/2019        Health Maintenance   Topic Date Due    Annual Wellness Visit (AWV)  05/29/2019    Flu vaccine (1) 09/01/2020    Lipid screen  05/22/2021    Colon cancer screen colonoscopy  03/22/2027    DTaP/Tdap/Td vaccine (3 - Td) 11/21/2029    Shingles Vaccine  Completed    Pneumococcal 65+ years Vaccine  Completed    AAA screen  Completed    Hepatitis C screen  Completed    Hepatitis A vaccine  Aged Out    Hepatitis B vaccine  Aged Out    Hib vaccine  Aged Out    Meningococcal (ACWY) vaccine  Aged Out     Recommendations for [employer] Due: see orders and patient instructions/AVS.  .   Recommended screening schedule for the next 5-10 years is provided to the patient in written form: see Patient Instructions/AVS.    Vashti Carmichael was seen today for medicare awv. Diagnoses and all orders for this visit:    Routine general medical examination at a health care facility              --DARIA Chavez CNP on 7/1/2020 at 9:26 AM    An electronic signature was used to authenticate this note.

## 2023-10-04 ENCOUNTER — OFFICE VISIT (OUTPATIENT)
Dept: PRIMARY CARE CLINIC | Age: 73
End: 2023-10-04
Payer: MEDICARE

## 2023-10-04 VITALS
DIASTOLIC BLOOD PRESSURE: 84 MMHG | WEIGHT: 210 LBS | OXYGEN SATURATION: 97 % | TEMPERATURE: 98.9 F | HEART RATE: 69 BPM | BODY MASS INDEX: 28.48 KG/M2 | SYSTOLIC BLOOD PRESSURE: 130 MMHG

## 2023-10-04 DIAGNOSIS — L72.3 SEBACEOUS CYST: Primary | ICD-10-CM

## 2023-10-04 PROCEDURE — 1123F ACP DISCUSS/DSCN MKR DOCD: CPT | Performed by: FAMILY MEDICINE

## 2023-10-04 PROCEDURE — 99214 OFFICE O/P EST MOD 30 MIN: CPT | Performed by: FAMILY MEDICINE

## 2023-10-05 ENCOUNTER — TELEPHONE (OUTPATIENT)
Dept: SURGERY | Age: 73
End: 2023-10-05

## 2023-10-05 ENCOUNTER — OFFICE VISIT (OUTPATIENT)
Dept: PRIMARY CARE CLINIC | Age: 73
End: 2023-10-05

## 2023-10-05 VITALS
TEMPERATURE: 98.5 F | HEART RATE: 72 BPM | DIASTOLIC BLOOD PRESSURE: 72 MMHG | OXYGEN SATURATION: 97 % | WEIGHT: 214 LBS | SYSTOLIC BLOOD PRESSURE: 134 MMHG | BODY MASS INDEX: 29.02 KG/M2

## 2023-10-05 DIAGNOSIS — L72.3 SEBACEOUS CYST: Primary | ICD-10-CM

## 2023-10-05 ASSESSMENT — ENCOUNTER SYMPTOMS: RESPIRATORY NEGATIVE: 1

## 2023-10-05 NOTE — PROGRESS NOTES
Conjunctiva/sclera: Conjunctivae normal.      Pupils: Pupils are equal, round, and reactive to light. Pulmonary:      Effort: Pulmonary effort is normal.   Abdominal:      Palpations: Abdomen is soft. Skin:     Findings: Abscess present. Comments: Healing abcess noted to patients back on lower left side. No active drainage or erythema noted. The wound is cleansed, debrided of foreign material as much as possible, packed with 1/4inch width x 2 inch long packing and dressed. The patient is alerted to watch for any signs of infection (redness, pus, pain, increased swelling or fever) and call if such occurs. Home wound care instructions are provided. Patient is to return in 2 days for wound check     Neurological:      Mental Status: He is alert. DIFFERENTIAL DIAGNOSIS:       Martin Medrano reviewed the disposition diagnosis with the patient and or their family/guardian. I have answered their questions and given discharge instructions. They voiced understanding of these instructions and did not have anyfurther questions or complaints. PROCEDURES:  No orders of the defined types were placed in this encounter. No results found for this visit on 10/05/23. FINALIMPRESSION            PLAN     Return in about 2 days (around 10/7/2023) for wound check. DISCHARGEMEDICATIONS:  No orders of the defined types were placed in this encounter. Plan: The wound is cleansed, debrided of foreign material as much as possible, packed with 1/4inch width x 2 inch long packing and dressed. The patient is alerted to watch for any signs of infection (redness, pus, pain, increased swelling or fever) and call if such occurs. Home wound care instructions are provided. Continue antibiotic as previously prescribed   Patient is to return in 2 days for wound check  Patient instructed to return to the office if symptoms worsen, return, or have any other concerns. Patient understands and is agreeable.
Yes

## 2023-10-05 NOTE — TELEPHONE ENCOUNTER
Patient called to speak with Dr Yi Mulligan' nurse about getting a cyst removed in surgery setting. Patient saw Dr Yi Mulligan in the office 9/20/2023 for a   subaceous cyst left lower back. He states at the time of the appointment Dr stated the cyst could be removed in surgery setting. [facility]erral # [id number]   issue date 9-7-2023 exp 3-.     Call [phone]

## 2023-10-06 NOTE — TELEPHONE ENCOUNTER
Called patient back and patient recently got the cyst drained and put on antibiotics. Patient is going to wait it out until it heals and then will gives us a call.

## 2023-10-07 ENCOUNTER — OFFICE VISIT (OUTPATIENT)
Dept: PRIMARY CARE CLINIC | Age: 73
End: 2023-10-07

## 2023-10-07 VITALS
DIASTOLIC BLOOD PRESSURE: 82 MMHG | HEART RATE: 92 BPM | SYSTOLIC BLOOD PRESSURE: 130 MMHG | OXYGEN SATURATION: 96 % | TEMPERATURE: 100.1 F

## 2023-10-07 DIAGNOSIS — L72.3 SEBACEOUS CYST: Primary | ICD-10-CM

## 2023-10-07 PROCEDURE — 99999 PR OFFICE/OUTPT VISIT,PROCEDURE ONLY: CPT | Performed by: PHYSICIAN ASSISTANT

## 2023-10-13 DIAGNOSIS — F41.9 ANXIETY: ICD-10-CM

## 2023-10-13 DIAGNOSIS — G47.00 INSOMNIA, UNSPECIFIED TYPE: ICD-10-CM

## 2023-10-13 RX ORDER — MIRTAZAPINE 7.5 MG/1
7.5 TABLET, FILM COATED ORAL NIGHTLY
Qty: 90 TABLET | Refills: 3 | Status: SHIPPED | OUTPATIENT
Start: 2023-10-13

## 2023-10-13 NOTE — TELEPHONE ENCOUNTER
LOV 9/28/23   RTO 3/28/23  LRF 8/7/23            Controlled Substance Monitoring:    Acute and Chronic Pain Monitoring:   RX Monitoring Acute Pain Prescriptions Periodic Controlled Substance Monitoring   6/25/2019   4:53 PM Prescription exceeds daily limit for a specific reason. See comments or note. No signs of potential drug abuse or diversion identified.

## 2023-11-08 ENCOUNTER — OFFICE VISIT (OUTPATIENT)
Dept: PRIMARY CARE CLINIC | Age: 73
End: 2023-11-08
Payer: MEDICARE

## 2023-11-08 VITALS
HEART RATE: 76 BPM | OXYGEN SATURATION: 96 % | SYSTOLIC BLOOD PRESSURE: 146 MMHG | WEIGHT: 216 LBS | DIASTOLIC BLOOD PRESSURE: 66 MMHG | TEMPERATURE: 98.4 F | BODY MASS INDEX: 29.29 KG/M2

## 2023-11-08 DIAGNOSIS — M54.42 ACUTE LEFT-SIDED LOW BACK PAIN WITH LEFT-SIDED SCIATICA: Primary | ICD-10-CM

## 2023-11-08 PROCEDURE — 96372 THER/PROPH/DIAG INJ SC/IM: CPT | Performed by: PHYSICIAN ASSISTANT

## 2023-11-08 PROCEDURE — 1123F ACP DISCUSS/DSCN MKR DOCD: CPT | Performed by: PHYSICIAN ASSISTANT

## 2023-11-08 PROCEDURE — 99213 OFFICE O/P EST LOW 20 MIN: CPT | Performed by: PHYSICIAN ASSISTANT

## 2023-11-08 RX ORDER — KETOROLAC TROMETHAMINE 30 MG/ML
60 INJECTION, SOLUTION INTRAMUSCULAR; INTRAVENOUS ONCE
Status: COMPLETED | OUTPATIENT
Start: 2023-11-08 | End: 2023-11-08

## 2023-11-08 RX ORDER — PREDNISONE 20 MG/1
20 TABLET ORAL 2 TIMES DAILY
Qty: 10 TABLET | Refills: 0 | Status: SHIPPED | OUTPATIENT
Start: 2023-11-08 | End: 2023-11-13

## 2023-11-08 RX ORDER — BACLOFEN 10 MG/1
10 TABLET ORAL 3 TIMES DAILY
Qty: 30 TABLET | Refills: 0 | Status: SHIPPED | OUTPATIENT
Start: 2023-11-08 | End: 2023-11-18

## 2023-11-08 RX ADMIN — KETOROLAC TROMETHAMINE 60 MG: 30 INJECTION, SOLUTION INTRAMUSCULAR; INTRAVENOUS at 12:36

## 2023-11-08 ASSESSMENT — ENCOUNTER SYMPTOMS
RESPIRATORY NEGATIVE: 1
GASTROINTESTINAL NEGATIVE: 1
BACK PAIN: 1

## 2023-11-08 NOTE — PROGRESS NOTES
[address]  [address] [facility] [address]  Phone: [phone]  Fax: [phone] [facility]    Pt Name: Gerardo Benitez  MRN: [id number]  [id number] [facility] [place] 1950  Date of evaluation: 11/8/2023  Provider: Meliton Munguia, [address] [facility]       Chief Complaint   Patient presents with    Lower Back Pain     Lower back pain radiates into the left hip for for 1 week. HISTORY OF PRESENT ILLNESS  (Location/Symptom, Timing/Onset, Context/Setting, Quality, Duration, Modifying Factors, Severity.)   Gerardo Benitez is a 68 y.o. White (non-) [1] male who presents to the office for evaluation of      Back Pain  This is a new problem. The current episode started in the past 7 days. The pain is present in the lumbar spine. The quality of the pain is described as aching. The pain is moderate. Nursing Notes were reviewed. REVIEW OF SYSTEMS    (2-9 systems for level 4, 10 or more for level 5)     Review of Systems   HENT: Negative. Respiratory: Negative. Cardiovascular: Negative. Gastrointestinal: Negative. Genitourinary: Negative. Musculoskeletal:  Positive for back pain. Skin: Negative. Except as noted above the remainder of the review of systems was reviewed andnegative. PAST MEDICAL HISTORY   History reviewed. Past Medical History:   Diagnosis Date    Allergic rhinitis     Arthritis     BPH (benign prostatic hyperplasia)     Caffeine use     3 cups coffee/day    Chronic back pain     Diarrhea     ED (erectile dysfunction)     Esophageal stricture     GERD (gastroesophageal reflux disease)     Headache(784.0)     Hemorrhoids     Hyperlipemia     Rotator cuff tear, right 2012         SURGICAL HISTORY     History reviewed.     Past Surgical History:   Procedure Laterality Date    APPENDECTOMY      CARPAL TUNNEL RELEASE Right 02/2018    COLONOSCOPY      CYSTOSCOPY  5/4/09    ESOPHAGEAL DILATATION      HAND SURGERY

## 2023-11-20 ENCOUNTER — OFFICE VISIT (OUTPATIENT)
Dept: PRIMARY CARE CLINIC | Age: 73
End: 2023-11-20
Payer: MEDICARE

## 2023-11-20 VITALS
SYSTOLIC BLOOD PRESSURE: 142 MMHG | HEART RATE: 89 BPM | TEMPERATURE: 98.5 F | BODY MASS INDEX: 29.29 KG/M2 | DIASTOLIC BLOOD PRESSURE: 86 MMHG | WEIGHT: 216 LBS | OXYGEN SATURATION: 98 %

## 2023-11-20 DIAGNOSIS — J01.90 ACUTE SINUSITIS, RECURRENCE NOT SPECIFIED, UNSPECIFIED LOCATION: Primary | ICD-10-CM

## 2023-11-20 DIAGNOSIS — M51.26 HERNIATED LUMBAR INTERVERTEBRAL DISC: ICD-10-CM

## 2023-11-20 PROCEDURE — 1123F ACP DISCUSS/DSCN MKR DOCD: CPT | Performed by: PHYSICIAN ASSISTANT

## 2023-11-20 PROCEDURE — 99213 OFFICE O/P EST LOW 20 MIN: CPT | Performed by: PHYSICIAN ASSISTANT

## 2023-11-20 RX ORDER — BENZONATATE 200 MG/1
200 CAPSULE ORAL 3 TIMES DAILY PRN
Qty: 21 CAPSULE | Refills: 0 | Status: SHIPPED | OUTPATIENT
Start: 2023-11-20 | End: 2023-11-27

## 2023-11-20 RX ORDER — PREDNISONE 20 MG/1
20 TABLET ORAL 2 TIMES DAILY
Qty: 10 TABLET | Refills: 0 | Status: SHIPPED | OUTPATIENT
Start: 2023-11-20 | End: 2023-11-25

## 2023-11-20 RX ORDER — GABAPENTIN 300 MG/1
300 CAPSULE ORAL
Qty: 180 CAPSULE | Refills: 0 | Status: SHIPPED | OUTPATIENT
Start: 2023-11-20 | End: 2024-11-19

## 2023-11-20 RX ORDER — DOXYCYCLINE HYCLATE 100 MG
100 TABLET ORAL 2 TIMES DAILY
Qty: 20 TABLET | Refills: 0 | Status: SHIPPED | OUTPATIENT
Start: 2023-11-20 | End: 2023-11-30

## 2023-11-20 ASSESSMENT — ENCOUNTER SYMPTOMS
SINUS PAIN: 1
COUGH: 1
GASTROINTESTINAL NEGATIVE: 1
SHORTNESS OF BREATH: 0
EYES NEGATIVE: 1
SINUS PRESSURE: 1
CHEST TIGHTNESS: 0
HOARSE VOICE: 1
WHEEZING: 0

## 2023-11-20 NOTE — TELEPHONE ENCOUNTER
LOV 09/28/2023  RTO 03/28/2024  LRF 07/06/2022          Controlled Substance Monitoring:    Acute and Chronic Pain Monitoring:   RX Monitoring Acute Pain Prescriptions Periodic Controlled Substance Monitoring   6/25/2019   4:53 PM Prescription exceeds daily limit for a specific reason. See comments or note. No signs of potential drug abuse or diversion identified.

## 2023-11-20 NOTE — PROGRESS NOTES
[address] [facility] [address]  [address] [place] [address]  Phone: [phone]  Fax: [phone] [facility]    Pt Name: Fitz Candelario  MRN: [id number]  [facility] [facility] 1950  Date of evaluation: 11/20/2023  Provider: Ashley Herrera [address] [place] [address]       Chief Complaint   Patient presents with    Facial Pain     Left side facial pain/teeth pain and cough. Started last Sunday. Taking mucinex. HISTORY OF PRESENT ILLNESS  (Location/Symptom, Timing/Onset, Context/Setting, Quality, Duration, Modifying Factors, Severity.)   Fitz Candelario is a 68 y.o. White (non-) [1] male who presents to the office for evaluation of      Sinusitis  This is a new problem. The current episode started 1 to 4 weeks ago. Associated symptoms include congestion, coughing, headaches, a hoarse voice and sinus pressure. Pertinent negatives include no ear pain or shortness of breath. Past treatments include oral decongestants. Nursing Notes were reviewed. REVIEW OF SYSTEMS    (2-9 systems for level 4, 10 or more for level 5)     Review of Systems   Constitutional:  Negative for fever. HENT:  Positive for congestion, hoarse voice, postnasal drip, sinus pressure and sinus pain. Negative for ear discharge and ear pain. Eyes: Negative. Respiratory:  Positive for cough. Negative for chest tightness, shortness of breath and wheezing. Cardiovascular: Negative. Gastrointestinal: Negative. Neurological:  Positive for headaches. Except as noted above the remainder of the review of systems was reviewed andnegative. PAST MEDICAL HISTORY   History reviewed.     Past Medical History:   Diagnosis Date    Allergic rhinitis     Arthritis     BPH (benign prostatic hyperplasia)     Caffeine use     3 cups coffee/day    Chronic back pain     Diarrhea     ED (erectile dysfunction)     Esophageal stricture     GERD (gastroesophageal reflux disease)     Headache(784.0)

## 2023-11-28 ENCOUNTER — TELEPHONE (OUTPATIENT)
Dept: FAMILY MEDICINE CLINIC | Age: 73
End: 2023-11-28

## 2023-11-29 DIAGNOSIS — I10 PRIMARY HYPERTENSION: Primary | ICD-10-CM

## 2023-11-29 RX ORDER — LOSARTAN POTASSIUM AND HYDROCHLOROTHIAZIDE 12.5; 1 MG/1; MG/1
1 TABLET ORAL DAILY
Qty: 30 TABLET | Refills: 0 | Status: SHIPPED | OUTPATIENT
Start: 2023-11-29

## 2023-11-29 NOTE — TELEPHONE ENCOUNTER
LM that a medication was sent to pharmacy and to call office back if he has any questions or concerns about it.

## 2023-11-29 NOTE — TELEPHONE ENCOUNTER
Please call patient and notify him that I have added a low dose water pill to his losartan and sent it to his pharmacy based on the Blood pressure readings he sent us. English

## 2023-12-14 ENCOUNTER — TELEPHONE (OUTPATIENT)
Dept: SURGERY | Age: 73
End: 2023-12-14

## 2023-12-14 NOTE — TELEPHONE ENCOUNTER
Patient was seen on 9/20/23 by Dr. Chio Payne for a SubQ mass of back. Patient states that the mass is no longer inflamed and no drainage. Patient was wanting SubQ mass excised. Do you want to see patient back in the office to discuss or just schedule him in the OR? Please advise. Will call patient.

## 2023-12-26 DIAGNOSIS — I10 PRIMARY HYPERTENSION: ICD-10-CM

## 2023-12-26 RX ORDER — LOSARTAN POTASSIUM AND HYDROCHLOROTHIAZIDE 12.5; 1 MG/1; MG/1
1 TABLET ORAL DAILY
Qty: 30 TABLET | OUTPATIENT
Start: 2023-12-26

## 2024-01-05 ENCOUNTER — HOSPITAL ENCOUNTER (OUTPATIENT)
Age: 74
Setting detail: OUTPATIENT SURGERY
Discharge: HOME | End: 2024-01-05
Attending: SURGERY | Admitting: SURGERY
Payer: OTHER GOVERNMENT

## 2024-01-05 ENCOUNTER — ANESTHESIA EVENT (OUTPATIENT)
Dept: OPERATING ROOM | Age: 74
End: 2024-01-05
Payer: OTHER GOVERNMENT

## 2024-01-05 ENCOUNTER — ANESTHESIA (OUTPATIENT)
Dept: OPERATING ROOM | Age: 74
End: 2024-01-05
Payer: OTHER GOVERNMENT

## 2024-01-05 VITALS
SYSTOLIC BLOOD PRESSURE: 150 MMHG | TEMPERATURE: 98.7 F | RESPIRATION RATE: 16 BRPM | HEIGHT: 72 IN | WEIGHT: 214 LBS | HEART RATE: 71 BPM | OXYGEN SATURATION: 96 % | DIASTOLIC BLOOD PRESSURE: 65 MMHG | BODY MASS INDEX: 28.99 KG/M2

## 2024-01-05 DIAGNOSIS — G89.18 POST-OP PAIN: Primary | ICD-10-CM

## 2024-01-05 DIAGNOSIS — R22.2 SUBCUTANEOUS MASS OF BACK: ICD-10-CM

## 2024-01-05 PROCEDURE — 3600000012 HC SURGERY LEVEL 2 ADDTL 15MIN: Performed by: SURGERY

## 2024-01-05 PROCEDURE — 7100000010 HC PHASE II RECOVERY - FIRST 15 MIN: Performed by: SURGERY

## 2024-01-05 PROCEDURE — 2709999900 HC NON-CHARGEABLE SUPPLY: Performed by: SURGERY

## 2024-01-05 PROCEDURE — 13101 CMPLX RPR TRUNK 2.6-7.5 CM: CPT | Performed by: SURGERY

## 2024-01-05 PROCEDURE — 21930 EXC BACK LES SC < 3 CM: CPT | Performed by: SURGERY

## 2024-01-05 PROCEDURE — 6360000002 HC RX W HCPCS: Performed by: NURSE ANESTHETIST, CERTIFIED REGISTERED

## 2024-01-05 PROCEDURE — 6360000002 HC RX W HCPCS: Performed by: SURGERY

## 2024-01-05 PROCEDURE — 88304 TISSUE EXAM BY PATHOLOGIST: CPT

## 2024-01-05 PROCEDURE — 3600000002 HC SURGERY LEVEL 2 BASE: Performed by: SURGERY

## 2024-01-05 PROCEDURE — 3700000000 HC ANESTHESIA ATTENDED CARE: Performed by: SURGERY

## 2024-01-05 PROCEDURE — 7100000011 HC PHASE II RECOVERY - ADDTL 15 MIN: Performed by: SURGERY

## 2024-01-05 PROCEDURE — 2580000003 HC RX 258: Performed by: SURGERY

## 2024-01-05 PROCEDURE — 2500000003 HC RX 250 WO HCPCS: Performed by: NURSE ANESTHETIST, CERTIFIED REGISTERED

## 2024-01-05 PROCEDURE — 3700000001 HC ADD 15 MINUTES (ANESTHESIA): Performed by: SURGERY

## 2024-01-05 RX ORDER — SODIUM CHLORIDE 9 MG/ML
INJECTION, SOLUTION INTRAVENOUS PRN
Status: DISCONTINUED | OUTPATIENT
Start: 2024-01-05 | End: 2024-01-05 | Stop reason: HOSPADM

## 2024-01-05 RX ORDER — PROPOFOL 10 MG/ML
INJECTION, EMULSION INTRAVENOUS PRN
Status: DISCONTINUED | OUTPATIENT
Start: 2024-01-05 | End: 2024-01-05 | Stop reason: SDUPTHER

## 2024-01-05 RX ORDER — LIDOCAINE HYDROCHLORIDE 10 MG/ML
INJECTION, SOLUTION EPIDURAL; INFILTRATION; INTRACAUDAL; PERINEURAL PRN
Status: DISCONTINUED | OUTPATIENT
Start: 2024-01-05 | End: 2024-01-05 | Stop reason: SDUPTHER

## 2024-01-05 RX ORDER — SODIUM CHLORIDE 0.9 % (FLUSH) 0.9 %
5-40 SYRINGE (ML) INJECTION PRN
Status: DISCONTINUED | OUTPATIENT
Start: 2024-01-05 | End: 2024-01-05 | Stop reason: HOSPADM

## 2024-01-05 RX ORDER — TRAMADOL HYDROCHLORIDE 50 MG/1
50 TABLET ORAL EVERY 6 HOURS PRN
Qty: 20 TABLET | Refills: 0 | Status: SHIPPED | OUTPATIENT
Start: 2024-01-05 | End: 2024-01-10

## 2024-01-05 RX ORDER — SODIUM CHLORIDE, SODIUM LACTATE, POTASSIUM CHLORIDE, CALCIUM CHLORIDE 600; 310; 30; 20 MG/100ML; MG/100ML; MG/100ML; MG/100ML
INJECTION, SOLUTION INTRAVENOUS CONTINUOUS
Status: DISCONTINUED | OUTPATIENT
Start: 2024-01-05 | End: 2024-01-05 | Stop reason: HOSPADM

## 2024-01-05 RX ORDER — SODIUM CHLORIDE 0.9 % (FLUSH) 0.9 %
5-40 SYRINGE (ML) INJECTION EVERY 12 HOURS SCHEDULED
Status: DISCONTINUED | OUTPATIENT
Start: 2024-01-05 | End: 2024-01-05 | Stop reason: HOSPADM

## 2024-01-05 RX ADMIN — PROPOFOL 100 MG: 10 INJECTION, EMULSION INTRAVENOUS at 10:30

## 2024-01-05 RX ADMIN — Medication 2000 MG: at 10:32

## 2024-01-05 RX ADMIN — LIDOCAINE HYDROCHLORIDE 50 MG: 10 INJECTION, SOLUTION EPIDURAL; INFILTRATION; INTRACAUDAL; PERINEURAL at 10:30

## 2024-01-05 RX ADMIN — PROPOFOL 150 MCG/KG/MIN: 10 INJECTION, EMULSION INTRAVENOUS at 10:31

## 2024-01-05 RX ADMIN — SODIUM CHLORIDE, POTASSIUM CHLORIDE, SODIUM LACTATE AND CALCIUM CHLORIDE: 600; 310; 30; 20 INJECTION, SOLUTION INTRAVENOUS at 09:16

## 2024-01-05 ASSESSMENT — PAIN - FUNCTIONAL ASSESSMENT
PAIN_FUNCTIONAL_ASSESSMENT: NONE - DENIES PAIN

## 2024-01-05 NOTE — H&P
Subjective   Reginaldo Rios is a 73 y.o. male who presents today for further evaluation and recommendations for recurrent subcutaneous mass of the left mid back.  Patient is known to me and has previously been seen for a likely ruptured epidermal inclusion cyst.  He had been to the ER previously and had a drainage procedure.  When I saw him he was healing well and we discussed management options but decided to forego any surgery at that time because of concern of infection then inflammation in the field.  Since then he is had good healing states that he has not had any further issues since I saw him last.  Comes back in today to discuss potential removal.  He has had a prior excision at that site several years ago with a different surgeon but states that a couple years after that it seems like the mass returned.  Was told it was a benign mass when it was prior previously excised.  No new symptoms.     Past Medical History        Past Medical History:   Diagnosis Date    Allergic rhinitis      Arthritis      BPH (benign prostatic hyperplasia)      Caffeine use       3 cups coffee/day    Chronic back pain      Diarrhea      ED (erectile dysfunction)      Esophageal stricture      GERD (gastroesophageal reflux disease)      Headache(784.0)      Hemorrhoids      Hyperlipemia      Rotator cuff tear, right 2012            Past Surgical History         Past Surgical History:   Procedure Laterality Date    APPENDECTOMY        CARPAL TUNNEL RELEASE Right 02/2018    COLONOSCOPY        CYSTOSCOPY   5/4/09    ESOPHAGEAL DILATATION        HAND SURGERY        HERNIA REPAIR        LUMBAR LAMINECTOMY   10/17/2019     Dr Lin     ROTATOR CUFF REPAIR        TOE SURGERY        TOTAL HIP ARTHROPLASTY Left 06/25/2018     hip    UPPER GASTROINTESTINAL ENDOSCOPY                Current Facility-Administered Medications          Current Outpatient Medications   Medication Sig Dispense Refill    losartan-hydroCHLOROthiazide  [place] of Occupational [employer] - Occupational Stress Questionnaire      Feeling of Stress : Not at all   Social Connections: Socially Isolated (6/29/2021)     Social Connection and Isolation Panel [NHANES]      Frequency of Communication with Friends and Family: Once a week      Frequency of Social Gatherings with Friends and Family: Once a week      Attends Jain Services: Never      Active Member of Clubs or Organizations: No      Attends Club or Organization Meetings: Never      Marital Status:    Intimate Partner Violence: Not At Risk (6/29/2021)     Humiliation, Afraid, Rape, and Kick questionnaire      Fear of Current or Ex-Partner: No      Emotionally Abused: No      Physically Abused: No      Sexually Abused: No   Housing Stability: Unknown (6/6/2023)     Housing Stability Vital Sign      Unable to Pay for Housing in the Last Year: Not on file      Number of Places Lived in the Last Year: Not on file      Unstable Housing in the Last Year: No            ROS:   Review of Systems - General ROS: negative  Psychological ROS: negative  Ophthalmic ROS: negative  ENT ROS: negative  Respiratory ROS: no cough, shortness of breath, or wheezing  Cardiovascular ROS: no chest pain or dyspnea on exertion  Gastrointestinal ROS: no abdominal pain, change in bowel habits, or black or bloody stools  Genito-Urinary ROS: no dysuria, trouble voiding, or hematuria  Musculoskeletal ROS: negative  Dermatological ROS: per HPI        Objective       Vitals:     12/19/23 0753   BP: 122/70   Pulse: 74      General:in no apparent distress  Eyes: No gross abnormalities.  Ears, Nose, Throat: hearing grossly normal bilaterally  Neck: neck supple and non tender without mass  Lungs: clear to auscultation without wheezes or rales   Heart: S1S2, no mumurs, RRR  Abdomen: soft, nontender, no HSM, no guarding, no rebound, no masses  Extremity: negative  Neuro: CN II-XII grossly intact     At the left mid to lower back there is a

## 2024-01-05 NOTE — ANESTHESIA PRE PROCEDURE
Department of Anesthesiology  Preprocedure Note       Name:  Reginaldo Pascualjczyk   Age:  73 y.o.  :  1950                                          MRN:  [id number]         Date:  2024      Surgeon: Surgeon(s):  Elvin Mcintyre DO    Procedure: Procedure(s):  EXCISION Subcutaneous mass left back -53    Medications prior to admission:   Prior to Admission medications    Medication Sig Start Date End Date Taking? Authorizing Provider   losartan-hydroCHLOROthiazide (HYZAAR) 100-12.5 MG per tablet Take 1 tablet by mouth daily 23   Shawna Chaudhry APRN - CNP   gabapentin (NEURONTIN) 300 MG capsule Take 1 capsule by mouth in the morning and 1 capsule in the evening. 23  Shawna Chaudhry APRN - CNP   mirtazapine (REMERON) 7.5 MG tablet Take 1 tablet by mouth nightly 10/13/23   Shawna Chaudhry APRN - CNP   losartan (COZAAR) 100 MG tablet Take 1 tablet by mouth daily 23   Shawna Chaudhry APRN - CNP   carvedilol (COREG) 6.25 MG tablet Take 1 tablet by mouth 2 times daily 23  Shawna Chaudhry APRN - CNP   Misc. Devices (CPAP MACHINE) MISC use as directed for 1 year 23   Evelyn Tamaoy MD   Cholecalciferol (VITAMIN D3) 50 MCG (2000) CAPS Take by mouth    Evelyn Tamayo MD   zinc 50 MG TABS tablet Take 1 tablet by mouth daily    Evelyn Tamayo MD   tadalafil (CIALIS) 20 MG tablet Take 1 tablet by mouth daily as needed for Erectile Dysfunction 23   Juwan Long MD   tamsulosin (FLOMAX) 0.4 MG capsule Take 1 capsule by mouth daily 23   Juwan Long MD   [place]-Glucosamine-Vit D (OSTEO BI-FLEX ONE PER DAY PO) Take 1 tablet by mouth daily    Evelyn Tamayo MD   atorvastatin (LIPITOR) 40 MG tablet TAKE 1 TABLET DAILY 23   Shayna Sepulveda, APRN - NP   pantoprazole (PROTONIX) 40 MG tablet  6/13/22   Evelyn Tamayo MD   Alum Hydroxide-Mag Carbonate (GAVISCON EXTRA RELIEF FORMULA) [phone]

## 2024-01-05 NOTE — OP NOTE
[address] [address]                                OPERATIVE REPORT    PATIENT NAME: HARIS REYNOLDS               :        1950  MED REC NO:   [id number]                             ROOM:  ACCOUNT NO:   [id number]                           ADMIT DATE: 2024  PROVIDER:     Elvin Mcintyre    DATE OF PROCEDURE:  2024    SURGEON:  Dr. Elvin Mcintyre.    ASSISTANT:  None.    PREOPERATIVE DIAGNOSES:  1.  History of subcutaneous mass of the left back that had previously  been excised several years ago.  2.  Excess scar formation of prior excision site at left back.  3.  Possible recent recurrence of subcutaneous cyst.    POSTOPERATIVE DIAGNOSES:  1.  History of subcutaneous mass of the left back that had previously  been excised several years ago.  2.  Excess scar formation of prior excision site at left back.  3.  Possible recent recurrence of subcutaneous cyst.  4.  Excision of skin and subcutaneous tissue of left back with scar  revision.    ANESTHESIA:  MAC with local.    ESTIMATED BLOOD LOSS:  15 mL.    FLUIDS:  500 mL of crystalloid.    COMPLICATIONS:  None.    SPECIMENS:  Excised skin and subcutaneous tissue of left back containing  area of prior scar.    INDICATION FOR PROCEDURE:  The patient is a 73-year-old gentleman who  was referred to my office for complaints of an area of concern at his  left back.  Several years prior, the patient has had what sounds like  was probably an epidermal cyst removed from that area.  He had recently  had some inflammation and drainage in the area and there was concern  that he potentially had recurrence of the cyst.  By the time I saw him  in office, there was really nothing that could be noted there though he  had just recently healed whatever open area of skin was present.  The  area did seem to be fairly scarred both at the skin and there was a lot  of fairly hard

## 2024-01-05 NOTE — ANESTHESIA POSTPROCEDURE EVALUATION
Department of Anesthesiology  Postprocedure Note    Patient: Reginaldo Rios  MRN: [id number]  YOB: 1950  Date of evaluation: 1/5/2024    Procedure Summary       Date: 01/05/24 Room / Location: [address]    Anesthesia Start: 1024 Anesthesia Stop: 1107    Procedure: EXCISION Subcutaneous mass left back -53 (Left) Diagnosis:       Subcutaneous mass of back      (Subcutaneous mass of back [R22.2])    Surgeons: Elvin Mcintyre DO Responsible Provider: Unruly Zapata II, APRN - CRNA    Anesthesia Type: MAC ASA Status: 2            Anesthesia Type: MAC    Racheal Phase I: Racheal Score: 10    Racheal Phase II:      Anesthesia Post Evaluation    Patient location during evaluation: bedside  Patient participation: complete - patient participated  Level of consciousness: awake  Pain score: 0  Airway patency: patent  Nausea & Vomiting: no nausea and no vomiting  Cardiovascular status: blood pressure returned to baseline  Respiratory status: acceptable  Hydration status: euvolemic  Pain management: adequate    No notable events documented.

## 2024-01-05 NOTE — DISCHARGE INSTRUCTIONS
Patient Discharge Instructions  Discharge Date:  01/05/24       Discharged To:  Home    Home with Home Health Care: No    RESUME ACTIVITY:      BATHING: Ok to shower starting the day after surgery.  No tub baths or submerging in water until after follow up in office.    DRIVING: No driving for 1day or while taking narcotic pain medications    RETURN TO WORK: Ok to return as tolerated 1 week following surgery with the following restrictions:  No lifting more than 10 pounds  The above restrictions are in effect for 1 week(s)    WALKING:  Yes    STAIRS:  Yes    LIFTING: Less than 10 pounds for 1week      DIET: common adult    SPECIAL INSTRUCTIONS:     Call the office at [phone] if you have a fever > 100 F, or if your incision becomes red, tender, or drains more than a small amount of clear fluid.    Call for follow up appointment with Dr. Mcintyre in:  1-2weeks    May use ibuprofen, if able, for additional pain control.  Use up to 400mg every 6 hours as needed.    Ok to use ice packs to incisions for comfort.  Use 15 minutes on, 30 minutes off and repeat as desired.    Use over the counter stool softeners such as miralax or colace as needed for constipation.    Problems / Questions - Call [phone] ([facility]) or after hours call [phone] ([facility]) and they will page the doctor for you.

## 2024-01-05 NOTE — PROGRESS NOTES
CLINICAL PHARMACY NOTE: MEDS TO BEDS    Total # of Prescriptions Filled: 1   The following medications were delivered to the patient:  Tramadol 50mg    Additional Documentation:

## 2024-01-07 PROBLEM — L90.5: Status: ACTIVE | Noted: 2024-01-07

## 2024-01-10 LAB — SURGICAL PATHOLOGY REPORT: NORMAL

## 2024-01-11 ENCOUNTER — TELEPHONE (OUTPATIENT)
Dept: SURGERY | Age: 74
End: 2024-01-11

## 2024-01-11 NOTE — TELEPHONE ENCOUNTER
Patient contacted office regarding pimple like rash on his back. Post op excision of skin and subcutaneous tissue of left back 1/5/24. Patient states he called and spoke with [place] [facility] [place] and they suspected it might be due to the skin prep they use pre-op and recommended patient use OTC benadryl. Patient states he bought a benadryl spray and his sister has been applying on rash but hasn't noticed any improvement. Patient states the rash is not affecting his incision but he wanted to call and inform our office and asked if he should take oral benadryl as well. Instructed patient to try and take benadryl tablets PO and see if that helps and to contact our office if rash gets any worse and advised patient he could go to Urgent Care if needed.     Post op appointment scheduled next Wednesday 1/17/24 @ 2:40 pm.

## 2024-01-17 ENCOUNTER — OFFICE VISIT (OUTPATIENT)
Dept: SURGERY | Age: 74
End: 2024-01-17
Payer: OTHER GOVERNMENT

## 2024-01-17 VITALS
BODY MASS INDEX: 30.07 KG/M2 | HEART RATE: 74 BPM | DIASTOLIC BLOOD PRESSURE: 62 MMHG | WEIGHT: 222 LBS | HEIGHT: 72 IN | SYSTOLIC BLOOD PRESSURE: 122 MMHG

## 2024-01-17 DIAGNOSIS — Z09 POSTOP CHECK: Primary | ICD-10-CM

## 2024-01-17 PROCEDURE — 99024 POSTOP FOLLOW-UP VISIT: CPT | Performed by: SURGERY

## 2024-01-17 PROCEDURE — 99213 OFFICE O/P EST LOW 20 MIN: CPT | Performed by: SURGERY

## 2024-01-17 NOTE — PROGRESS NOTES
Subjective   Reginaldo Rios is a 73 y.o. male who presents today for follow-up after excision of a lesion from the left back.  Patient underwent procedure about a week and a half ago.  Since surgery states he has been doing well overall.  Has had very little pain.  No drainage from the incision.  No other complaints.    Past Medical History:   Diagnosis Date    Allergic rhinitis     Arthritis     BPH (benign prostatic hyperplasia)     Caffeine use     3 cups coffee/day    Chronic back pain     Diarrhea     ED (erectile dysfunction)     Esophageal stricture     GERD (gastroesophageal reflux disease)     Headache(784.0)     Hemorrhoids     Hyperlipemia     Rotator cuff tear, right 2012       Past Surgical History:   Procedure Laterality Date    APPENDECTOMY      CARPAL TUNNEL RELEASE Right 02/2018    COLONOSCOPY      CYSTOSCOPY  5/4/09    ESOPHAGEAL DILATATION      HAND SURGERY      HERNIA REPAIR      LUMBAR LAMINECTOMY  10/17/2019    Dr Lin     ROTATOR CUFF REPAIR      SKIN LESION EXCISION Left 1/5/2024    EXCISION Subcutaneous mass left back performed by Elvin Mcintyre DO at [facility] OR    TOE SURGERY      TOTAL HIP ARTHROPLASTY Left 06/25/2018    hip    UPPER GASTROINTESTINAL ENDOSCOPY         Current Outpatient Medications   Medication Sig Dispense Refill    losartan-hydroCHLOROthiazide (HYZAAR) 100-12.5 MG per tablet Take 1 tablet by mouth daily 90 tablet 1    gabapentin (NEURONTIN) 300 MG capsule Take 1 capsule by mouth in the morning and 1 capsule in the evening. 180 capsule 0    mirtazapine (REMERON) 7.5 MG tablet Take 1 tablet by mouth nightly 90 tablet 3    losartan (COZAAR) 100 MG tablet Take 1 tablet by mouth daily 90 tablet 1    carvedilol (COREG) 6.25 MG tablet Take 1 tablet by mouth 2 times daily 180 tablet 1    Misc. Devices (CPAP MACHINE) MISC use as directed for 1 year      Cholecalciferol (VITAMIN D3) 50 MCG (2000 UT) CAPS Take by mouth      zinc 50 MG TABS tablet Take 1 tablet by mouth

## 2024-01-17 NOTE — ASSESSMENT & PLAN NOTE
Patient seems to be doing well after surgery and healing as expected.  Pathology reviewed and he voiced understanding.  Have advised that he had to adhere to activity restrictions for at least an additional week then return to regular activity as tolerated.  Otherwise no further plans from surgery.  Will plan for follow-up as needed.  Encouraged to call with any questions concerns or problems.

## 2024-01-26 DIAGNOSIS — M51.26 HERNIATED LUMBAR INTERVERTEBRAL DISC: ICD-10-CM

## 2024-01-26 RX ORDER — GABAPENTIN 300 MG/1
CAPSULE ORAL
Qty: 180 CAPSULE | Refills: 1 | Status: SHIPPED | OUTPATIENT
Start: 2024-01-26 | End: 2024-04-25

## 2024-01-26 NOTE — TELEPHONE ENCOUNTER
LOV 9/28/23 ntp   RTO 3/28/24  LRF 11/20/23          Controlled Substance Monitoring:    Acute and Chronic Pain Monitoring:   RX Monitoring Acute Pain Prescriptions Periodic Controlled Substance Monitoring   6/25/2019   4:53 PM Prescription exceeds daily limit for a specific reason. See comments or note. No signs of potential drug abuse or diversion identified.

## 2024-02-16 PROBLEM — Z09 POSTOP CHECK: Status: RESOLVED | Noted: 2024-01-17 | Resolved: 2024-02-16

## 2024-02-22 ENCOUNTER — OFFICE VISIT (OUTPATIENT)
Dept: PRIMARY CARE CLINIC | Age: 74
End: 2024-02-22
Payer: MEDICARE

## 2024-02-22 VITALS
BODY MASS INDEX: 30.52 KG/M2 | DIASTOLIC BLOOD PRESSURE: 60 MMHG | OXYGEN SATURATION: 98 % | HEART RATE: 88 BPM | TEMPERATURE: 101.8 F | WEIGHT: 225 LBS | SYSTOLIC BLOOD PRESSURE: 132 MMHG

## 2024-02-22 DIAGNOSIS — R50.9 FEVER, UNSPECIFIED FEVER CAUSE: ICD-10-CM

## 2024-02-22 DIAGNOSIS — R52 BODY ACHES: ICD-10-CM

## 2024-02-22 DIAGNOSIS — J10.1 INFLUENZA A: Primary | ICD-10-CM

## 2024-02-22 DIAGNOSIS — R05.1 ACUTE COUGH: ICD-10-CM

## 2024-02-22 LAB
INFLUENZA A ANTIBODY: POSITIVE
INFLUENZA B ANTIBODY: NEGATIVE

## 2024-02-22 PROCEDURE — 1123F ACP DISCUSS/DSCN MKR DOCD: CPT | Performed by: PHYSICIAN ASSISTANT

## 2024-02-22 PROCEDURE — 99213 OFFICE O/P EST LOW 20 MIN: CPT | Performed by: PHYSICIAN ASSISTANT

## 2024-02-22 PROCEDURE — 87804 INFLUENZA ASSAY W/OPTIC: CPT | Performed by: PHYSICIAN ASSISTANT

## 2024-02-22 RX ORDER — OSELTAMIVIR PHOSPHATE 75 MG/1
75 CAPSULE ORAL 2 TIMES DAILY
Qty: 10 CAPSULE | Refills: 0 | Status: SHIPPED | OUTPATIENT
Start: 2024-02-22 | End: 2024-02-27 | Stop reason: ALTCHOICE

## 2024-02-22 NOTE — PROGRESS NOTES
[facility] Walk In  [address] [place] [address]  Phone: [phone]  Fax: [phone]       [facility] WALK - [place]    Pt Name: Reginaldo Rios  MRN: [id number]  Birthdate 1950  Date of evaluation: 2/22/2024  Provider: Arlet Jacobson PA-C     CHIEF COMPLAINT       Chief Complaint   Patient presents with    Generalized Body Aches     Body aches, cough, fever, chest congestion, headache, bilateral ear pain and fatigue started Tuesday night.            HISTORY OF PRESENT ILLNESS  (Location/Symptom, Timing/Onset, Context/Setting, Quality, Duration, Modifying Factors, Severity.)   Reginaldo Rios is a 73 y.o. White (non-) [1] male who presents to the office for evaluation of      Generalized Body Aches  This is a new problem. The current episode started in the past 7 days. Associated symptoms include chills, congestion, coughing, fatigue, a fever, headaches and myalgias. Pertinent negatives include no sore throat. The symptoms are aggravated by drinking, eating and swallowing.       Nursing Notes were reviewed.    REVIEW OF SYSTEMS    (2-9 systems for level 4, 10 or more for level 5)     Review of Systems   Constitutional:  Positive for chills, fatigue and fever.   HENT:  Positive for congestion, ear pain and postnasal drip. Negative for sore throat.    Eyes: Negative.    Respiratory:  Positive for cough.    Cardiovascular: Negative.    Gastrointestinal: Negative.    Musculoskeletal:  Positive for myalgias.   Neurological:  Positive for headaches.         Except as noted above the remainder of the review of systems was reviewed andnegative.       PAST MEDICAL HISTORY   History reviewed.    Past Medical History:   Diagnosis Date    Allergic rhinitis     Arthritis     BPH (benign prostatic hyperplasia)     Caffeine use     3 cups coffee/day    Chronic back pain     Diarrhea     ED (erectile dysfunction)     Esophageal stricture     GERD (gastroesophageal reflux disease)     Headache(784.0)

## 2024-02-27 ENCOUNTER — OFFICE VISIT (OUTPATIENT)
Dept: PRIMARY CARE CLINIC | Age: 74
End: 2024-02-27
Payer: MEDICARE

## 2024-02-27 VITALS
BODY MASS INDEX: 29.43 KG/M2 | WEIGHT: 217 LBS | OXYGEN SATURATION: 98 % | SYSTOLIC BLOOD PRESSURE: 126 MMHG | DIASTOLIC BLOOD PRESSURE: 68 MMHG | HEART RATE: 70 BPM | TEMPERATURE: 98.8 F

## 2024-02-27 DIAGNOSIS — J40 BRONCHITIS: Primary | ICD-10-CM

## 2024-02-27 DIAGNOSIS — L03.032 CELLULITIS OF TOE OF LEFT FOOT: ICD-10-CM

## 2024-02-27 PROCEDURE — 99213 OFFICE O/P EST LOW 20 MIN: CPT | Performed by: PHYSICIAN ASSISTANT

## 2024-02-27 PROCEDURE — 1123F ACP DISCUSS/DSCN MKR DOCD: CPT | Performed by: PHYSICIAN ASSISTANT

## 2024-02-27 RX ORDER — BENZONATATE 200 MG/1
200 CAPSULE ORAL 3 TIMES DAILY PRN
Qty: 21 CAPSULE | Refills: 0 | Status: SHIPPED | OUTPATIENT
Start: 2024-02-27 | End: 2024-03-05

## 2024-02-27 RX ORDER — DOXYCYCLINE HYCLATE 100 MG
100 TABLET ORAL 2 TIMES DAILY
Qty: 20 TABLET | Refills: 0 | Status: SHIPPED | OUTPATIENT
Start: 2024-02-27 | End: 2024-03-08

## 2024-02-27 RX ORDER — PREDNISONE 20 MG/1
20 TABLET ORAL 2 TIMES DAILY
Qty: 10 TABLET | Refills: 0 | Status: SHIPPED | OUTPATIENT
Start: 2024-02-27 | End: 2024-03-03

## 2024-02-27 ASSESSMENT — ENCOUNTER SYMPTOMS
COUGH: 1
EYES NEGATIVE: 1
GASTROINTESTINAL NEGATIVE: 1
SORE THROAT: 0

## 2024-03-01 ASSESSMENT — ENCOUNTER SYMPTOMS
WHEEZING: 0
GASTROINTESTINAL NEGATIVE: 1
SHORTNESS OF BREATH: 1
EYES NEGATIVE: 1
COUGH: 1

## 2024-03-01 NOTE — PROGRESS NOTES
[facility] Walk In  [address] [place] [address]  Phone: [phone]  Fax: [phone]       [facility] WALK - [place]    Pt Name: Reginaldo Rios  MRN: [id number]  Birthdate 1950  Date of evaluation: 2/27/2024  Provider: Arlet Jacobson PA-C     CHIEF COMPLAINT       Chief Complaint   Patient presents with    Cough     Cough for 1 week.     Toe Pain     Left foot 4th digit toe discomfort.            HISTORY OF PRESENT ILLNESS  (Location/Symptom, Timing/Onset, Context/Setting, Quality, Duration, Modifying Factors, Severity.)   Reginaldo Rios is a 73 y.o. White (non-) [1] male who presents to the office for evaluation of      Cough  This is a new problem. The current episode started in the past 7 days. The cough is Productive of sputum. Associated symptoms include nasal congestion, postnasal drip and shortness of breath. Pertinent negatives include no fever or wheezing. He has tried OTC cough suppressant for the symptoms.   Toe Pain   The incident occurred 5 to 7 days ago. The injury mechanism is unknown. The pain is present in the left toes. The quality of the pain is described as aching. He reports no foreign bodies present. The symptoms are aggravated by movement, palpation and weight bearing.       Nursing Notes were reviewed.    REVIEW OF SYSTEMS    (2-9 systems for level 4, 10 or more for level 5)     Review of Systems   Constitutional:  Negative for diaphoresis, fatigue and fever.   HENT:  Positive for postnasal drip.    Eyes: Negative.    Respiratory:  Positive for cough and shortness of breath. Negative for wheezing.    Cardiovascular: Negative.    Gastrointestinal: Negative.    Genitourinary: Negative.    Musculoskeletal: Negative.    Skin:         Cellulitis to the patient's toe on the left         Except as noted above the remainder of the review of systems was reviewed andnegative.       PAST MEDICAL HISTORY   History reviewed.    Past Medical History:   Diagnosis Date

## 2024-03-28 ENCOUNTER — HOSPITAL ENCOUNTER (OUTPATIENT)
Age: 74
Discharge: HOME | End: 2024-03-30
Payer: MEDICARE

## 2024-03-28 ENCOUNTER — HOSPITAL ENCOUNTER (OUTPATIENT)
Dept: GENERAL RADIOLOGY | Age: 74
Discharge: HOME | End: 2024-03-30
Payer: MEDICARE

## 2024-03-28 ENCOUNTER — HOSPITAL ENCOUNTER (OUTPATIENT)
Age: 74
Setting detail: SPECIMEN
Discharge: HOME | End: 2024-03-28

## 2024-03-28 ENCOUNTER — OFFICE VISIT (OUTPATIENT)
Dept: FAMILY MEDICINE CLINIC | Age: 74
End: 2024-03-28
Payer: MEDICARE

## 2024-03-28 VITALS
TEMPERATURE: 97.5 F | SYSTOLIC BLOOD PRESSURE: 124 MMHG | HEIGHT: 72 IN | DIASTOLIC BLOOD PRESSURE: 62 MMHG | BODY MASS INDEX: 28.99 KG/M2 | WEIGHT: 214 LBS | OXYGEN SATURATION: 98 % | HEART RATE: 80 BPM

## 2024-03-28 DIAGNOSIS — E78.2 MIXED HYPERLIPIDEMIA: ICD-10-CM

## 2024-03-28 DIAGNOSIS — L97.529 ULCER OF TOE OF LEFT FOOT, UNSPECIFIED ULCER STAGE (HCC): ICD-10-CM

## 2024-03-28 DIAGNOSIS — I10 PRIMARY HYPERTENSION: Primary | ICD-10-CM

## 2024-03-28 DIAGNOSIS — K21.00 GASTROESOPHAGEAL REFLUX DISEASE WITH ESOPHAGITIS WITHOUT HEMORRHAGE: ICD-10-CM

## 2024-03-28 DIAGNOSIS — M62.838 MUSCLE SPASM: ICD-10-CM

## 2024-03-28 DIAGNOSIS — M51.26 HERNIATED LUMBAR INTERVERTEBRAL DISC: ICD-10-CM

## 2024-03-28 LAB
BASOPHILS # BLD: 0.04 K/UL (ref 0–0.2)
BASOPHILS NFR BLD: 1 % (ref 0–2)
EOSINOPHIL # BLD: 0.35 K/UL (ref 0–0.44)
EOSINOPHILS RELATIVE PERCENT: 6 % (ref 1–4)
ERYTHROCYTE [DISTWIDTH] IN BLOOD BY AUTOMATED COUNT: 12.3 % (ref 11.8–14.4)
HCT VFR BLD AUTO: 43.3 % (ref 40.7–50.3)
HGB BLD-MCNC: 14.7 G/DL (ref 13–17)
IMM GRANULOCYTES # BLD AUTO: <0.03 K/UL (ref 0–0.3)
IMM GRANULOCYTES NFR BLD: 0 %
LYMPHOCYTES NFR BLD: 1.79 K/UL (ref 1.1–3.7)
LYMPHOCYTES RELATIVE PERCENT: 31 % (ref 24–43)
MCH RBC QN AUTO: 32 PG (ref 25.2–33.5)
MCHC RBC AUTO-ENTMCNC: 33.9 G/DL (ref 28.4–34.8)
MCV RBC AUTO: 94.1 FL (ref 82.6–102.9)
MONOCYTES NFR BLD: 0.6 K/UL (ref 0.1–1.2)
MONOCYTES NFR BLD: 10 % (ref 3–12)
NEUTROPHILS NFR BLD: 52 % (ref 36–65)
NEUTS SEG NFR BLD: 2.98 K/UL (ref 1.5–8.1)
NRBC BLD-RTO: 0 PER 100 WBC
PLATELET # BLD AUTO: 335 K/UL (ref 138–453)
PMV BLD AUTO: 9.2 FL (ref 8.1–13.5)
RBC # BLD AUTO: 4.6 M/UL (ref 4.21–5.77)
WBC OTHER # BLD: 5.8 K/UL (ref 3.5–11.3)

## 2024-03-28 PROCEDURE — 1123F ACP DISCUSS/DSCN MKR DOCD: CPT

## 2024-03-28 PROCEDURE — 99214 OFFICE O/P EST MOD 30 MIN: CPT

## 2024-03-28 PROCEDURE — 3078F DIAST BP <80 MM HG: CPT

## 2024-03-28 PROCEDURE — 73620 X-RAY EXAM OF FOOT: CPT

## 2024-03-28 PROCEDURE — 3074F SYST BP LT 130 MM HG: CPT

## 2024-03-28 RX ORDER — GABAPENTIN 300 MG/1
300 CAPSULE ORAL DAILY
Qty: 180 CAPSULE | Refills: 1 | Status: SHIPPED | OUTPATIENT
Start: 2024-03-28 | End: 2025-03-23

## 2024-03-28 RX ORDER — IBUPROFEN 800 MG/1
800 TABLET ORAL 2 TIMES DAILY PRN
Qty: 180 TABLET | Refills: 1 | Status: SHIPPED | OUTPATIENT
Start: 2024-03-28

## 2024-03-28 RX ORDER — LOSARTAN POTASSIUM AND HYDROCHLOROTHIAZIDE 12.5; 1 MG/1; MG/1
1 TABLET ORAL DAILY
Qty: 90 TABLET | Refills: 1 | Status: SHIPPED | OUTPATIENT
Start: 2024-03-28

## 2024-03-28 RX ORDER — CEPHALEXIN 500 MG/1
500 CAPSULE ORAL 2 TIMES DAILY
Qty: 14 CAPSULE | Refills: 0 | Status: CANCELLED | OUTPATIENT
Start: 2024-03-28 | End: 2024-04-04

## 2024-03-28 RX ORDER — CARVEDILOL 6.25 MG/1
6.25 TABLET ORAL 2 TIMES DAILY
Qty: 180 TABLET | Refills: 1 | Status: SHIPPED | OUTPATIENT
Start: 2024-03-28 | End: 2025-03-28

## 2024-03-28 RX ORDER — ATORVASTATIN CALCIUM 40 MG/1
40 TABLET, FILM COATED ORAL DAILY
Qty: 90 TABLET | Refills: 3 | Status: SHIPPED | OUTPATIENT
Start: 2024-03-28 | End: 2025-03-28

## 2024-03-28 RX ORDER — PANTOPRAZOLE SODIUM 40 MG/1
40 TABLET, DELAYED RELEASE ORAL DAILY
Qty: 30 TABLET | Refills: 2 | Status: SHIPPED | OUTPATIENT
Start: 2024-03-28 | End: 2025-03-28

## 2024-03-28 RX ORDER — CEPHALEXIN 500 MG/1
500 CAPSULE ORAL 2 TIMES DAILY
Qty: 14 CAPSULE | Refills: 0 | Status: SHIPPED | OUTPATIENT
Start: 2024-03-28 | End: 2024-04-04

## 2024-03-28 ASSESSMENT — ENCOUNTER SYMPTOMS
CONSTIPATION: 0
DIARRHEA: 0
SHORTNESS OF BREATH: 0
WHEEZING: 0

## 2024-03-28 ASSESSMENT — PATIENT HEALTH QUESTIONNAIRE - PHQ9
SUM OF ALL RESPONSES TO PHQ QUESTIONS 1-9: 0
2. FEELING DOWN, DEPRESSED OR HOPELESS: NOT AT ALL
SUM OF ALL RESPONSES TO PHQ QUESTIONS 1-9: 0
1. LITTLE INTEREST OR PLEASURE IN DOING THINGS: NOT AT ALL
SUM OF ALL RESPONSES TO PHQ QUESTIONS 1-9: 0
SUM OF ALL RESPONSES TO PHQ QUESTIONS 1-9: 0
SUM OF ALL RESPONSES TO PHQ9 QUESTIONS 1 & 2: 0

## 2024-03-28 NOTE — PROGRESS NOTES
P PHYSICIANS  [facility] PRIMARY CARE [address] [address]  Dept: [phone]        CHIEF COMPLAINT:      Chief Complaint   Patient presents with    Hypertension    Toe Injury     Left foot, Next to big toe, red and swollen, had gotten blisters when in [place],        SUBJECTIVE      Reginaldo Rios is a 73 y.o. male who presents HTN recheck.    [facility]     HTN-Losartan-HCTZ 100 12.5mg, carvedilol 6.25mg. Does check blood pressure at home. Denies chest pain, SOB, headaches, or signs of high or low BP. Does not drink much water.     Feb 19th. Was walking at [employer]. Got 20 thousand steps. Says he has blisters on left foot. Says it is still swelling up and red. 3 days started uses pain. No fever. Toes causing pain. No drainage. Says they have maybe gotten slightly better. Was on doxycylcine from walk in.     Takes ibuprofen for aches and pains. Takes it mostly when back acts up.     Review of Systems   Constitutional:  Negative for fatigue and unexpected weight change.   HENT: Negative.     Respiratory:  Negative for shortness of breath and wheezing.    Cardiovascular:  Negative for chest pain and palpitations.   Gastrointestinal:  Negative for constipation and diarrhea.   Genitourinary: Negative.    Musculoskeletal:  Positive for arthralgias.   Skin:         Blisters on left 2nd and 3rd toe     Neurological:  Negative for dizziness and headaches.   Psychiatric/Behavioral: Negative.          HISTORY:        Past Medical History:   Diagnosis Date    Allergic rhinitis     Arthritis     BPH (benign prostatic hyperplasia)     Caffeine use     3 cups coffee/day    Chronic back pain     Diarrhea     ED (erectile dysfunction)     Esophageal stricture     GERD (gastroesophageal reflux disease)     Headache(784.0)     Hemorrhoids     Hyperlipemia     Rotator cuff tear, right 2012        Past Surgical History:   Procedure Laterality Date    APPENDECTOMY      CARPAL  Yes

## 2024-03-29 NOTE — RESULT ENCOUNTER NOTE
Xray looks okay. Please continue your antibiotic and please reach out if no improvement or worsening of redness, pain of your toe.

## 2024-03-30 NOTE — TELEPHONE ENCOUNTER
Ordered and routed Patient notified of results and treatment recommendations.  Denies questions/concerns.     ----- Message from JENI Garibay sent at 3/30/2024  4:43 PM CDT -----  Please notify Familia that radiologist concurs that there is NOT a fracture in the thumb.  See Work Comp Monday or Tuesday for recheck exam.  Follow instructions given at visit.

## 2024-05-19 ENCOUNTER — HOSPITAL ENCOUNTER (EMERGENCY)
Facility: CLINIC | Age: 74
Discharge: HOME | End: 2024-05-19
Attending: EMERGENCY MEDICINE
Payer: MEDICARE

## 2024-05-19 VITALS
BODY MASS INDEX: 28.71 KG/M2 | SYSTOLIC BLOOD PRESSURE: 155 MMHG | RESPIRATION RATE: 18 BRPM | HEIGHT: 72 IN | WEIGHT: 212 LBS | HEART RATE: 63 BPM | OXYGEN SATURATION: 95 % | DIASTOLIC BLOOD PRESSURE: 78 MMHG | TEMPERATURE: 97.8 F

## 2024-05-19 DIAGNOSIS — M54.50 RIGHT-SIDED LOW BACK PAIN WITHOUT SCIATICA, UNSPECIFIED CHRONICITY: Primary | ICD-10-CM

## 2024-05-19 PROCEDURE — 99284 EMERGENCY DEPT VISIT MOD MDM: CPT

## 2024-05-19 PROCEDURE — 6370000000 HC RX 637 (ALT 250 FOR IP): Performed by: PHYSICIAN ASSISTANT

## 2024-05-19 PROCEDURE — 6360000002 HC RX W HCPCS: Performed by: PHYSICIAN ASSISTANT

## 2024-05-19 PROCEDURE — 96372 THER/PROPH/DIAG INJ SC/IM: CPT

## 2024-05-19 RX ORDER — LIDOCAINE 4 G/G
1 PATCH TOPICAL ONCE
Status: DISCONTINUED | OUTPATIENT
Start: 2024-05-19 | End: 2024-05-19 | Stop reason: HOSPADM

## 2024-05-19 RX ORDER — LIDOCAINE 4 G/G
1 PATCH TOPICAL DAILY
Qty: 30 PATCH | Refills: 0 | Status: SHIPPED | OUTPATIENT
Start: 2024-05-19 | End: 2024-06-18

## 2024-05-19 RX ORDER — CYCLOBENZAPRINE HCL 5 MG
5 TABLET ORAL
Qty: 20 TABLET | Refills: 0 | Status: SHIPPED | OUTPATIENT
Start: 2024-05-19 | End: 2024-06-08

## 2024-05-19 RX ORDER — KETOROLAC TROMETHAMINE 30 MG/ML
30 INJECTION, SOLUTION INTRAMUSCULAR; INTRAVENOUS ONCE
Status: COMPLETED | OUTPATIENT
Start: 2024-05-19 | End: 2024-05-19

## 2024-05-19 RX ADMIN — KETOROLAC TROMETHAMINE 30 MG: 30 INJECTION, SOLUTION INTRAMUSCULAR at 11:04

## 2024-05-19 ASSESSMENT — PAIN DESCRIPTION - DESCRIPTORS: DESCRIPTORS: ACHING;SHARP;SHOOTING

## 2024-05-19 ASSESSMENT — PAIN DESCRIPTION - FREQUENCY: FREQUENCY: INTERMITTENT

## 2024-05-19 ASSESSMENT — PAIN - FUNCTIONAL ASSESSMENT: PAIN_FUNCTIONAL_ASSESSMENT: 0-10

## 2024-05-19 ASSESSMENT — PAIN DESCRIPTION - ORIENTATION: ORIENTATION: RIGHT;LOWER

## 2024-05-19 ASSESSMENT — PAIN DESCRIPTION - LOCATION: LOCATION: BACK

## 2024-05-19 ASSESSMENT — PAIN SCALES - GENERAL: PAINLEVEL_OUTOF10: 6

## 2024-05-19 ASSESSMENT — PAIN DESCRIPTION - PAIN TYPE: TYPE: ACUTE PAIN

## 2024-05-19 NOTE — ED NOTES
Mode of arrival (squad #, walk in, police, etc) : walk in, from home        Chief complaint(s): right lower back pain        Arrival Note (brief scenario, treatment PTA, etc).: Pt presented to the ED c/o intermittent right lower back pain that has been ongoing for the past couple of weeks. Reports that the pain has been getting progressively worse and he has been taking 800 mg ibuprofen at home with little relief. Reports he was working in his garden and has been cutting his grass on uneven ground and is unsure if he pulled a muscle. Reports he has a fusion of his L4-L5 by Dr. Lin prior, but states he has not seen him in a while. Denies numbness and tingling down the leg, denies difficulty urinating or blood in urine. No tenderness noted with palpations. He reports the pain is sharp and shooting when he makes certain movements.         C= \"Have you ever felt that you should Cut down on your drinking?\"  No  A= \"Have people Annoyed you by criticizing your drinking?\"  No  G= \"Have you ever felt bad or Guilty about your drinking?\"  No  E= \"Have you ever had a drink as an Eye-opener first thing in the morning to steady your nerves or to help a hangover?\"  No      Deferred []      Reason for deferring: N/A    *If yes to two or more: probable alcohol abuse.*

## 2024-05-19 NOTE — ED PROVIDER NOTES
eMERGENCY dEPARTMENT eNCOUnter   Independent Attestation     Pt Name: Reginaldo Rios  MRN: [id number]  Birthdate 1950  Date of evaluation: 5/19/24     Reginaldo Rios is a 73 y.o. male with CC: Back Pain (Right lower back, intermittent for the past couple weeks, but gotten progressively worse)      Based on the medical record the care appears appropriate.  I was personally available for consultation in the Emergency Department.    Jaren Can MD  Attending Emergency Physician                Jaren Can MD  05/19/24 [id number]

## 2024-05-19 NOTE — ED PROVIDER NOTES
[facility]  eMERGENCY dEPARTMENT eNCOUnter      Pt Name: Reginaldo Rios  MRN: [id number]  Birthdate 1950  Date of evaluation: 5/19/2024  Provider: Duane Cyr PA-C    CHIEF COMPLAINT       Chief Complaint   Patient presents with    Back Pain     Right lower back, intermittent for the past couple weeks, but gotten progressively worse           HISTORY OF PRESENT ILLNESS  (Location/Symptom, Timing/Onset, Context/Setting, Quality, Duration, Modifying Factors, Severity.)   Reginaldo Rios is a 73 y.o. male who presents to the emergency department complaining of right-sided lower back pain that started after he fell in a hole guarding at home this last week.  He does have a history of spinal fusion 2019 at L4-L5.  He denies any new injuries, he denies any numbness or tingling, denies any radiation of symptoms.  He has tried some anti-inflammatories which has been helping slightly with the pain    Patient has a history of chronic back pain.    No loss of bowel or bladder control, no numbness or tingling  denies any fevers, chills, abdominal pain nausea or vomiting    Location/Symptom: Right lumbar back  Quality: Aching  Duration: Persistent  Modifying Factors: Worse with bending and twisting  Severity: Mild    Nursing Notes were reviewed.    REVIEW OF SYSTEMS    (2-9 systems for level 4, 10 or more for level 5)     Review of Systems   Constitutional: Negative.   Respiratory: Negative.   Cardiovascular: Negative.   Gastrointestinal: Negative.   Musculoskeletal: Complaining of back pain  Genitourinary: Negative.   Skin: Negative.   Neurological: Negative.     Except as noted above the remainder of the review of systems was reviewed and negative.       PAST MEDICAL HISTORY         Diagnosis Date    Allergic rhinitis     Arthritis     BPH (benign prostatic hyperplasia)     Caffeine use     3 cups coffee/day    Chronic back pain     Diarrhea     ED (erectile dysfunction)     Esophageal stricture

## 2024-05-20 ENCOUNTER — HOSPITAL ENCOUNTER (EMERGENCY)
Age: 74
Discharge: HOME | End: 2024-05-20
Attending: EMERGENCY MEDICINE
Payer: MEDICARE

## 2024-05-20 VITALS
HEIGHT: 72 IN | WEIGHT: 212 LBS | OXYGEN SATURATION: 97 % | TEMPERATURE: 98.2 F | RESPIRATION RATE: 16 BRPM | HEART RATE: 68 BPM | SYSTOLIC BLOOD PRESSURE: 158 MMHG | BODY MASS INDEX: 28.71 KG/M2 | DIASTOLIC BLOOD PRESSURE: 83 MMHG

## 2024-05-20 DIAGNOSIS — S39.012D STRAIN OF LUMBAR REGION, SUBSEQUENT ENCOUNTER: Primary | ICD-10-CM

## 2024-05-20 PROCEDURE — 6370000000 HC RX 637 (ALT 250 FOR IP): Performed by: EMERGENCY MEDICINE

## 2024-05-20 PROCEDURE — 99283 EMERGENCY DEPT VISIT LOW MDM: CPT

## 2024-05-20 RX ORDER — PREDNISONE 20 MG/1
40 TABLET ORAL ONCE
Status: COMPLETED | OUTPATIENT
Start: 2024-05-20 | End: 2024-05-20

## 2024-05-20 RX ORDER — PREDNISONE 10 MG/1
TABLET ORAL
Qty: 20 TABLET | Refills: 0 | Status: SHIPPED | OUTPATIENT
Start: 2024-05-20 | End: 2024-05-30

## 2024-05-20 RX ADMIN — PREDNISONE 40 MG: 20 TABLET ORAL at 08:59

## 2024-05-20 ASSESSMENT — ENCOUNTER SYMPTOMS
DIARRHEA: 0
VOMITING: 0
NAUSEA: 0
SORE THROAT: 0
SHORTNESS OF BREATH: 0
BACK PAIN: 1
ABDOMINAL PAIN: 0

## 2024-05-20 ASSESSMENT — PAIN - FUNCTIONAL ASSESSMENT: PAIN_FUNCTIONAL_ASSESSMENT: 0-10

## 2024-05-20 ASSESSMENT — PAIN DESCRIPTION - ORIENTATION: ORIENTATION: LOWER

## 2024-05-20 ASSESSMENT — PAIN DESCRIPTION - LOCATION: LOCATION: BACK

## 2024-05-20 ASSESSMENT — PAIN DESCRIPTION - PAIN TYPE: TYPE: ACUTE PAIN

## 2024-05-20 NOTE — DISCHARGE INSTRUCTIONS
PLEASE RETURN TO THE EMERGENCY DEPARTMENT IMMEDIATELY if your symptoms worsen in anyway or in 1-2 days if not improved for re-evaluation.  You should immediately return to the ER for symptoms such as worsening pain, abdominal pain, bloody stools, numbness or weakness to the arms or legs, difficulty with urination or defecation, difficulty with ambulation, fever, coolness or color change to the extremity, chest pain, shortness of breath, a feeling that you are going to pass out, light headed, dizziness.      Take your medication as indicated and prescribed.  If you are given an antibiotic then, make sure you get the prescription filled and take the antibiotics until finished.      Please understand that at this time there is no evidence for a more serious underlying process, but that early in the process of an illness or injury, an emergency department workup can be falsely reassuring.  You should contact your family doctor within the next 48 hours for a follow up appointment    THANK YOU!!!    From [facility]    On behalf of the Emergency Department staff at [facility], I would like to thank you for giving us the opportunity to address your health care needs and concerns.    We hope that during your visit, our service was delivered in a professional and caring manner. Please keep [facility] in mind as we walk with you down the path to your own personal wellness.     Please expect an automated text message or email from us so we can ask a few questions about your health and progress. Based on your answers, a clinician may call you back to offer help and instructions.    Please understand that early in the process of an illness or injury, an emergency department workup can be falsely reassuring.  If you notice any worsening, changing or persistent symptoms please call your family doctor or return to the ER immediately.     Tell us how we did during your visit at

## 2024-05-23 ENCOUNTER — OFFICE VISIT (OUTPATIENT)
Dept: FAMILY MEDICINE CLINIC | Age: 74
End: 2024-05-23
Payer: MEDICARE

## 2024-05-23 VITALS
DIASTOLIC BLOOD PRESSURE: 68 MMHG | HEART RATE: 67 BPM | WEIGHT: 216 LBS | OXYGEN SATURATION: 98 % | BODY MASS INDEX: 29.26 KG/M2 | HEIGHT: 72 IN | SYSTOLIC BLOOD PRESSURE: 128 MMHG | TEMPERATURE: 98 F

## 2024-05-23 DIAGNOSIS — S39.012D STRAIN OF LUMBAR REGION, SUBSEQUENT ENCOUNTER: Primary | ICD-10-CM

## 2024-05-23 PROCEDURE — 3078F DIAST BP <80 MM HG: CPT

## 2024-05-23 PROCEDURE — 3074F SYST BP LT 130 MM HG: CPT

## 2024-05-23 PROCEDURE — 1123F ACP DISCUSS/DSCN MKR DOCD: CPT

## 2024-05-23 PROCEDURE — 99213 OFFICE O/P EST LOW 20 MIN: CPT

## 2024-05-23 ASSESSMENT — ENCOUNTER SYMPTOMS
BACK PAIN: 1
SHORTNESS OF BREATH: 0
WHEEZING: 0
GASTROINTESTINAL NEGATIVE: 1

## 2024-05-23 ASSESSMENT — PATIENT HEALTH QUESTIONNAIRE - PHQ9
SUM OF ALL RESPONSES TO PHQ QUESTIONS 1-9: 0
2. FEELING DOWN, DEPRESSED OR HOPELESS: NOT AT ALL
1. LITTLE INTEREST OR PLEASURE IN DOING THINGS: NOT AT ALL
SUM OF ALL RESPONSES TO PHQ QUESTIONS 1-9: 0
SUM OF ALL RESPONSES TO PHQ9 QUESTIONS 1 & 2: 0

## 2024-05-23 NOTE — PROGRESS NOTES
Representative sections 1c.  tm    SM/kb2:1/8/2024  Microscopic Description  Microscopic examination performed.    Processing Lab:  [address] [address]  Interpretation Performed at [address] [address]    SURGICAL PATHOLOGY CONSULTATION       Patient Name: REGINALDO REYNOLDS  [facility] Rec: [id number]  [facility]  [employer] [place]  [facility]  ANATOMIC PATHOLOGY  [address] [place] [id number] [phone]  Fax: [phone]          ASSESSMENT & ORDERS      Reginaldo was seen today for back pain.    Diagnoses and all orders for this visit:    Strain of lumbar region, subsequent encounter         PLAN:     1. Strain of lumbar region, subsequent encounter    2.Back symptoms continue to improve.   3. Continue voltaren and complete steroid  4. Continue stretches  5. Work on good body mechanics  6. If back pain worsens-can consider physical therapy, medication refill, or neurosurgery referral.     Return if symptoms worsen or fail to improve.         Electronically signed by DARIA Olivares CNP on 5/23/2024 at 11:18 AM

## 2024-06-13 RX ORDER — CYCLOBENZAPRINE HCL 5 MG
5 TABLET ORAL
Qty: 20 TABLET | Refills: 0 | Status: SHIPPED | OUTPATIENT
Start: 2024-06-13 | End: 2024-07-03

## 2024-06-13 NOTE — TELEPHONE ENCOUNTER
LOV 5/23/24   RTO 6/26/24  LRF 5/20/24 Voltaren, 5/19/24 flexeril          Controlled Substance Monitoring:    Acute and Chronic Pain Monitoring:   RX Monitoring Acute Pain Prescriptions Periodic Controlled Substance Monitoring   6/25/2019   4:53 PM Prescription exceeds daily limit for a specific reason. See comments or note. No signs of potential drug abuse or diversion identified.

## 2024-06-26 ENCOUNTER — OFFICE VISIT (OUTPATIENT)
Dept: FAMILY MEDICINE CLINIC | Age: 74
End: 2024-06-26
Payer: MEDICARE

## 2024-06-26 VITALS
OXYGEN SATURATION: 96 % | TEMPERATURE: 98.2 F | HEART RATE: 75 BPM | SYSTOLIC BLOOD PRESSURE: 138 MMHG | HEIGHT: 72 IN | WEIGHT: 215 LBS | BODY MASS INDEX: 29.12 KG/M2 | DIASTOLIC BLOOD PRESSURE: 68 MMHG

## 2024-06-26 DIAGNOSIS — R20.2 NUMBNESS AND TINGLING IN RIGHT HAND: ICD-10-CM

## 2024-06-26 DIAGNOSIS — B35.1 ONYCHOMYCOSIS: ICD-10-CM

## 2024-06-26 DIAGNOSIS — Z12.5 SCREENING FOR PROSTATE CANCER: ICD-10-CM

## 2024-06-26 DIAGNOSIS — G47.30 SEVERE SLEEP APNEA: ICD-10-CM

## 2024-06-26 DIAGNOSIS — Z00.00 MEDICARE ANNUAL WELLNESS VISIT, SUBSEQUENT: ICD-10-CM

## 2024-06-26 DIAGNOSIS — I10 PRIMARY HYPERTENSION: ICD-10-CM

## 2024-06-26 DIAGNOSIS — Z85.828 HISTORY OF BASAL CELL CANCER: ICD-10-CM

## 2024-06-26 DIAGNOSIS — M47.816 LUMBAR SPONDYLOSIS: ICD-10-CM

## 2024-06-26 DIAGNOSIS — Z00.00 MEDICARE ANNUAL WELLNESS VISIT, SUBSEQUENT: Primary | ICD-10-CM

## 2024-06-26 DIAGNOSIS — R20.0 NUMBNESS AND TINGLING IN RIGHT HAND: ICD-10-CM

## 2024-06-26 DIAGNOSIS — Z13.0 SCREENING FOR DEFICIENCY ANEMIA: ICD-10-CM

## 2024-06-26 DIAGNOSIS — Z13.1 SCREENING FOR DIABETES MELLITUS: ICD-10-CM

## 2024-06-26 DIAGNOSIS — Z00.00 ENCOUNTER FOR MEDICAL EXAMINATION TO ESTABLISH CARE: Primary | ICD-10-CM

## 2024-06-26 DIAGNOSIS — E78.2 MIXED HYPERLIPIDEMIA: ICD-10-CM

## 2024-06-26 DIAGNOSIS — Z00.00 ENCOUNTER FOR MEDICAL EXAMINATION TO ESTABLISH CARE: ICD-10-CM

## 2024-06-26 DIAGNOSIS — M54.41 CHRONIC RIGHT-SIDED LOW BACK PAIN WITH RIGHT-SIDED SCIATICA: ICD-10-CM

## 2024-06-26 DIAGNOSIS — Z13.220 SCREENING FOR LIPID DISORDERS: ICD-10-CM

## 2024-06-26 DIAGNOSIS — G89.29 CHRONIC RIGHT-SIDED LOW BACK PAIN WITH RIGHT-SIDED SCIATICA: ICD-10-CM

## 2024-06-26 PROCEDURE — 3078F DIAST BP <80 MM HG: CPT | Performed by: REGISTERED NURSE

## 2024-06-26 PROCEDURE — 99999 PR OFFICE/OUTPT VISIT,PROCEDURE ONLY: CPT | Performed by: REGISTERED NURSE

## 2024-06-26 PROCEDURE — 99214 OFFICE O/P EST MOD 30 MIN: CPT | Performed by: REGISTERED NURSE

## 2024-06-26 PROCEDURE — 3075F SYST BP GE 130 - 139MM HG: CPT | Performed by: REGISTERED NURSE

## 2024-06-26 PROCEDURE — G0439 PPPS, SUBSEQ VISIT: HCPCS | Performed by: REGISTERED NURSE

## 2024-06-26 PROCEDURE — 1123F ACP DISCUSS/DSCN MKR DOCD: CPT | Performed by: REGISTERED NURSE

## 2024-06-26 RX ORDER — CYCLOBENZAPRINE HCL 5 MG
5 TABLET ORAL
Qty: 20 TABLET | Refills: 0 | Status: SHIPPED | OUTPATIENT
Start: 2024-06-26 | End: 2024-07-16

## 2024-06-26 SDOH — ECONOMIC STABILITY: FOOD INSECURITY: WITHIN THE PAST 12 MONTHS, YOU WORRIED THAT YOUR FOOD WOULD RUN OUT BEFORE YOU GOT MONEY TO BUY MORE.: NEVER TRUE

## 2024-06-26 SDOH — ECONOMIC STABILITY: FOOD INSECURITY: WITHIN THE PAST 12 MONTHS, THE FOOD YOU BOUGHT JUST DIDN'T LAST AND YOU DIDN'T HAVE MONEY TO GET MORE.: NEVER TRUE

## 2024-06-26 SDOH — ECONOMIC STABILITY: INCOME INSECURITY: HOW HARD IS IT FOR YOU TO PAY FOR THE VERY BASICS LIKE FOOD, HOUSING, MEDICAL CARE, AND HEATING?: NOT HARD AT ALL

## 2024-06-26 ASSESSMENT — PATIENT HEALTH QUESTIONNAIRE - PHQ9
SUM OF ALL RESPONSES TO PHQ QUESTIONS 1-9: 0
SUM OF ALL RESPONSES TO PHQ9 QUESTIONS 1 & 2: 0
2. FEELING DOWN, DEPRESSED OR HOPELESS: NOT AT ALL
SUM OF ALL RESPONSES TO PHQ QUESTIONS 1-9: 0
SUM OF ALL RESPONSES TO PHQ QUESTIONS 1-9: 0
1. LITTLE INTEREST OR PLEASURE IN DOING THINGS: NOT AT ALL
SUM OF ALL RESPONSES TO PHQ QUESTIONS 1-9: 0

## 2024-06-26 ASSESSMENT — ENCOUNTER SYMPTOMS
SORE THROAT: 0
RESPIRATORY NEGATIVE: 1
GASTROINTESTINAL NEGATIVE: 1
DIARRHEA: 0
CONSTIPATION: 0
VOMITING: 0
COUGH: 0
SHORTNESS OF BREATH: 0
NAUSEA: 0
SINUS PRESSURE: 0
BACK PAIN: 1
ABDOMINAL PAIN: 0

## 2024-06-26 ASSESSMENT — LIFESTYLE VARIABLES
HOW MANY STANDARD DRINKS CONTAINING ALCOHOL DO YOU HAVE ON A TYPICAL DAY: 3 OR 4
HOW OFTEN DURING THE LAST YEAR HAVE YOU FOUND THAT YOU WERE NOT ABLE TO STOP DRINKING ONCE YOU HAD STARTED: NEVER
HOW OFTEN DO YOU HAVE A DRINK CONTAINING ALCOHOL: 4 OR MORE TIMES A WEEK
HOW OFTEN DURING THE LAST YEAR HAVE YOU FAILED TO DO WHAT WAS NORMALLY EXPECTED FROM YOU BECAUSE OF DRINKING: NEVER
HOW OFTEN DURING THE LAST YEAR HAVE YOU NEEDED AN ALCOHOLIC DRINK FIRST THING IN THE MORNING TO GET YOURSELF GOING AFTER A NIGHT OF HEAVY DRINKING: NEVER
HAS A RELATIVE, FRIEND, DOCTOR, OR ANOTHER HEALTH PROFESSIONAL EXPRESSED CONCERN ABOUT YOUR DRINKING OR SUGGESTED YOU CUT DOWN: NO
HOW OFTEN DURING THE LAST YEAR HAVE YOU BEEN UNABLE TO REMEMBER WHAT HAPPENED THE NIGHT BEFORE BECAUSE YOU HAD BEEN DRINKING: NEVER
HAVE YOU OR SOMEONE ELSE BEEN INJURED AS A RESULT OF YOUR DRINKING: NO
HOW OFTEN DURING THE LAST YEAR HAVE YOU HAD A FEELING OF GUILT OR REMORSE AFTER DRINKING: NEVER

## 2024-06-26 NOTE — PATIENT INSTRUCTIONS
aspirin. Wait for an ambulance. Do not try to drive yourself.  Watch closely for changes in your health, and be sure to contact your doctor if you have any problems.  Where can you learn more?  Go to https://www.[employer].net/patientEd and enter F075 to learn more about \"A Healthy Heart: Care Instructions.\"  Current as of: June 24, 2023  Content Version: 14.1  © 7907-4523 [employer].   Care instructions adapted under license by [employer]. If you have questions about a medical condition or this instruction, always ask your healthcare professional. [employer] disclaims any warranty or liability for your use of this information.      Personalized Preventive Plan for Reginaldo Rios - 6/26/2024  Medicare offers a range of preventive health benefits. Some of the tests and screenings are paid in full while other may be subject to a deductible, co-insurance, and/or copay.    Some of these benefits include a comprehensive review of your medical history including lifestyle, illnesses that may run in your family, and various assessments and screenings as appropriate.    After reviewing your medical record and screening and assessments performed today your provider may have ordered immunizations, labs, imaging, and/or referrals for you.  A list of these orders (if applicable) as well as your Preventive Care list are included within your After Visit Summary for your review.    Other Preventive Recommendations:    A preventive eye exam performed by an eye specialist is recommended every 1-2 years to screen for glaucoma; cataracts, macular degeneration, and other eye disorders.  A preventive dental visit is recommended every 6 months.  Try to get at least 150 minutes of exercise per week or 10,000 steps per day on a pedometer .  Order or download the FREE \"Exercise & Physical Activity: Your Everyday Guide\" from [employer] [place] [employer]. Call 1-136.645.7258 or search [employer] [place]

## 2024-06-27 ENCOUNTER — HOSPITAL ENCOUNTER (OUTPATIENT)
Dept: GENERAL RADIOLOGY | Age: 74
Discharge: HOME | End: 2024-06-29
Payer: MEDICARE

## 2024-06-27 ENCOUNTER — HOSPITAL ENCOUNTER (OUTPATIENT)
Age: 74
Discharge: HOME | End: 2024-06-29
Payer: MEDICARE

## 2024-06-27 DIAGNOSIS — M54.41 CHRONIC RIGHT-SIDED LOW BACK PAIN WITH RIGHT-SIDED SCIATICA: ICD-10-CM

## 2024-06-27 DIAGNOSIS — R20.0 NUMBNESS AND TINGLING IN RIGHT HAND: ICD-10-CM

## 2024-06-27 DIAGNOSIS — R20.2 NUMBNESS AND TINGLING IN RIGHT HAND: ICD-10-CM

## 2024-06-27 DIAGNOSIS — G89.29 CHRONIC RIGHT-SIDED LOW BACK PAIN WITH RIGHT-SIDED SCIATICA: ICD-10-CM

## 2024-06-27 PROCEDURE — 72070 X-RAY EXAM THORAC SPINE 2VWS: CPT

## 2024-06-27 PROCEDURE — 72040 X-RAY EXAM NECK SPINE 2-3 VW: CPT

## 2024-06-27 PROCEDURE — 72100 X-RAY EXAM L-S SPINE 2/3 VWS: CPT

## 2024-07-01 ENCOUNTER — HOSPITAL ENCOUNTER (OUTPATIENT)
Age: 74
Setting detail: SPECIMEN
Discharge: HOME | End: 2024-07-01

## 2024-07-01 DIAGNOSIS — Z12.5 SCREENING FOR PROSTATE CANCER: ICD-10-CM

## 2024-07-01 DIAGNOSIS — E78.2 MIXED HYPERLIPIDEMIA: ICD-10-CM

## 2024-07-01 DIAGNOSIS — Z13.0 SCREENING FOR DEFICIENCY ANEMIA: ICD-10-CM

## 2024-07-01 DIAGNOSIS — Z13.1 SCREENING FOR DIABETES MELLITUS: ICD-10-CM

## 2024-07-01 LAB
ALBUMIN SERPL-MCNC: 4.3 G/DL (ref 3.5–5.2)
ALBUMIN/GLOB SERPL: 2 {RATIO} (ref 1–2.5)
ALP SERPL-CCNC: 89 U/L (ref 40–129)
ALT SERPL-CCNC: 37 U/L (ref 10–50)
ANION GAP SERPL CALCULATED.3IONS-SCNC: 10 MMOL/L (ref 9–16)
AST SERPL-CCNC: 36 U/L (ref 10–50)
BILIRUB SERPL-MCNC: 0.6 MG/DL (ref 0–1.2)
BUN SERPL-MCNC: 14 MG/DL (ref 8–23)
CALCIUM SERPL-MCNC: 8.8 MG/DL (ref 8.6–10.4)
CHLORIDE SERPL-SCNC: 103 MMOL/L (ref 98–107)
CHOLEST SERPL-MCNC: 158 MG/DL (ref 0–199)
CHOLESTEROL/HDL RATIO: 3
CO2 SERPL-SCNC: 27 MMOL/L (ref 20–31)
CREAT SERPL-MCNC: 0.7 MG/DL (ref 0.7–1.2)
ERYTHROCYTE [DISTWIDTH] IN BLOOD BY AUTOMATED COUNT: 12 % (ref 11.8–14.4)
EST. AVERAGE GLUCOSE BLD GHB EST-MCNC: 114 MG/DL
GFR, ESTIMATED: >90 ML/MIN/1.73M2
GLUCOSE P FAST SERPL-MCNC: 97 MG/DL (ref 74–99)
HBA1C MFR BLD: 5.6 % (ref 4–6)
HCT VFR BLD AUTO: 42.1 % (ref 40.7–50.3)
HDLC SERPL-MCNC: 54 MG/DL
HGB BLD-MCNC: 14.6 G/DL (ref 13–17)
LDLC SERPL CALC-MCNC: 81 MG/DL (ref 0–100)
MCH RBC QN AUTO: 32.3 PG (ref 25.2–33.5)
MCHC RBC AUTO-ENTMCNC: 34.7 G/DL (ref 28.4–34.8)
MCV RBC AUTO: 93.1 FL (ref 82.6–102.9)
NRBC BLD-RTO: 0 PER 100 WBC
PLATELET # BLD AUTO: 309 K/UL (ref 138–453)
PMV BLD AUTO: 9.2 FL (ref 8.1–13.5)
POTASSIUM SERPL-SCNC: 4.4 MMOL/L (ref 3.7–5.3)
PROT SERPL-MCNC: 6.5 G/DL (ref 6.6–8.7)
PSA SERPL-MCNC: 0.9 NG/ML (ref 0–4)
RBC # BLD AUTO: 4.52 M/UL (ref 4.21–5.77)
SODIUM SERPL-SCNC: 140 MMOL/L (ref 136–145)
TRIGL SERPL-MCNC: 111 MG/DL
VLDLC SERPL CALC-MCNC: 22 MG/DL
WBC OTHER # BLD: 4.9 K/UL (ref 3.5–11.3)

## 2024-07-26 ENCOUNTER — TELEPHONE (OUTPATIENT)
Dept: FAMILY MEDICINE CLINIC | Age: 74
End: 2024-07-26

## 2024-07-26 DIAGNOSIS — G89.29 CHRONIC RIGHT-SIDED LOW BACK PAIN WITH RIGHT-SIDED SCIATICA: ICD-10-CM

## 2024-07-26 DIAGNOSIS — M54.41 CHRONIC RIGHT-SIDED LOW BACK PAIN WITH RIGHT-SIDED SCIATICA: ICD-10-CM

## 2024-07-26 NOTE — TELEPHONE ENCOUNTER
Patient contacted office stating that he tweaked his back again and is requesting a refill on the muscle relaxer he was prescribed in the past. Refill pended. Patient also states he may need a referral for the back pain.

## 2024-07-28 RX ORDER — CYCLOBENZAPRINE HCL 5 MG
5 TABLET ORAL
Qty: 20 TABLET | Refills: 0 | Status: SHIPPED | OUTPATIENT
Start: 2024-07-28 | End: 2024-08-17

## 2024-07-30 NOTE — TELEPHONE ENCOUNTER
Patient stopped in the office today, he would like a referral to Physical therapy. He also would like to know if he could get muscle relaxer's

## 2024-07-31 NOTE — TELEPHONE ENCOUNTER
Spoke with pt to let him know that medication was sent and that a referral was placed for [facility]. Pt would like to use [facility] as he has used them in the past. Referral canceled for [facility] and [facility] referral placed.

## 2024-08-07 NOTE — TELEPHONE ENCOUNTER
Patient stopped in the office this morning ( Reginaldo) he would like a referral for [facility] for his right hand - he had carpel tunnel surgery and its not been right. PT said they could work on it for him.

## 2024-08-18 NOTE — PATIENT INSTRUCTIONS
Patient Education        Middle Ear Fluid: Care Instructions  Overview     Fluid often builds up inside the ear during a cold or allergies. Usually the fluid drains away, but sometimes a small tube in the ear, called the eustachiantube, stays blocked for months. Symptoms of fluid buildup may include:   Popping, ringing, or a feeling of fullness or pressure in the ear.  Trouble hearing.  Balance problems and dizziness. In most cases, you can treat yourself at home. Follow-up care is a key part of your treatment and safety. Be sure to make and go to all appointments, and call your doctor if you are having problems. It's also a good idea to know your test results and keep alist of the medicines you take. How can you care for yourself at home?  In most cases, the fluid clears up within a few months without treatment. You may need more tests if the fluid does not clear up after 3 months.  For adults, decongestants that you take by mouth or spray into your nose may be helpful. If you have allergies, the doctor may prescribe a steroid medicine that you spray into your nose. When should you call for help? Call your doctor now or seek immediate medical care if:     You have symptoms of infection, such as:  ? Increased pain, swelling, warmth, or redness. ? Pus draining from the area. ? A fever. Watch closely for changes in your health, and be sure to contact your doctor if:     You notice changes in hearing.      You do not get better as expected. Where can you learn more? Go to [email or web address]. org and sign in to your [employer] account. Enter E268 in the [facility] box to learn more about \"Middle Ear Fluid: Care Instructions. \"     If you do not have an account, please click on the \"Sign Up Now\" link. Current as of: September 8, 2021               Content Version: 13.2  © 0393-7922 [employer]wise[employer] Inc[employer]. Care instructions adapted under license by [facility] ([facility]). If you have questions about a medical condition or this instruction, always ask your healthcare professional. [place] [address] any warranty or liability for your use of this information. 76

## 2024-08-19 ENCOUNTER — OFFICE VISIT (OUTPATIENT)
Age: 74
End: 2024-08-19
Payer: MEDICARE

## 2024-08-19 VITALS — BODY MASS INDEX: 29.12 KG/M2 | HEIGHT: 72 IN | WEIGHT: 215 LBS

## 2024-08-19 DIAGNOSIS — N40.1 BENIGN PROSTATIC HYPERPLASIA WITH URINARY OBSTRUCTION: Primary | ICD-10-CM

## 2024-08-19 DIAGNOSIS — R35.0 FREQUENCY OF MICTURITION: ICD-10-CM

## 2024-08-19 DIAGNOSIS — N13.8 BENIGN PROSTATIC HYPERPLASIA WITH URINARY OBSTRUCTION: Primary | ICD-10-CM

## 2024-08-19 DIAGNOSIS — N52.8 OTHER MALE ERECTILE DYSFUNCTION: ICD-10-CM

## 2024-08-19 PROCEDURE — 99214 OFFICE O/P EST MOD 30 MIN: CPT | Performed by: SPECIALIST

## 2024-08-19 PROCEDURE — 1123F ACP DISCUSS/DSCN MKR DOCD: CPT | Performed by: SPECIALIST

## 2024-08-19 PROCEDURE — 81003 URINALYSIS AUTO W/O SCOPE: CPT | Performed by: SPECIALIST

## 2024-08-19 RX ORDER — TAMSULOSIN HYDROCHLORIDE 0.4 MG/1
0.4 CAPSULE ORAL DAILY
Qty: 90 CAPSULE | Refills: 3 | Status: SHIPPED | OUTPATIENT
Start: 2024-08-19

## 2024-08-19 RX ORDER — TADALAFIL 5 MG/1
5 TABLET ORAL DAILY
Qty: 30 TABLET | Refills: 11 | Status: SHIPPED | OUTPATIENT
Start: 2024-08-19

## 2024-08-19 NOTE — PROGRESS NOTES
Juwan Long MD FACS    [facility] Urology Office Progress Note    Patient:  Reginaldo Rios  YOB: 1950  Date: 8/19/2024    HISTORY OF PRESENT ILLNESS:   The patient is a 73 y.o. male  Patient's lower urinary tract symptoms are stable on Flomax/tamsulosin 0.4 mg po qd for BPH symptoms.  He is not interested in surgical intervention at this point in time.  Will add Tadalafil (Cialis) 5 mg po qd for Erectile Dysfunction and his BPH.  He has an Rx for Tadalafil (Cialis) 20 mg prn ED.   Lower urinary tract symptoms: urgency, frequency, hesitancy, decreased urinary stream, and nocturia, 1 times per night   Last AUA Symptom Score (QOL): 20 (3)  Today's AUA Symptom Score (QOL): 20 (3)    Summary of old records:   \"[place]\"   BPH on tamsulosin 0.4 mg po qd (bid dosing didn't help); added Tadalafil (Cialis) 5 mg qd 8/19/24; discussed Greenlight, Urolift. Avodart didn't help. Cysto 5/09-lateral BPH  Nocturia x 2-3, tadalafil (Cialis) 5 mg po qd-didn't help   ED: Viagra-partial, HA; cialis 10-partial; Rx tadalafil (Cialis) 20 mg prn ED 8/17/23     Additional History: none    Procedures Today: N/A    Urinalysis today:  Results for POC orders placed in visit on 08/19/24   POCT Urinalysis No Micro (Auto)   Result Value Ref Range    Color, UA yellow     Clarity, UA clear     Glucose, UA POC neg     Bilirubin, UA      Ketones, UA      Spec Grav, UA      Blood, UA POC trace     pH, UA      Protein, UA POC neg     Urobilinogen, UA      Leukocytes, UA neg     Nitrite, UA neg        Last several PSA's:  Lab Results   Component Value Date    PSA 0.90 07/01/2024    PSA 0.88 06/07/2023    PSA 1.25 06/02/2022       Last total testosterone:  No results found for: \"TESTOSTERONE\"    Last BUN and creatinine:  Lab Results   Component Value Date    BUN 14 07/01/2024     Lab Results   Component Value Date    CREATININE 0.7 07/01/2024       Last CBC:  Lab Results   Component Value Date    WBC 4.9

## 2024-09-04 ENCOUNTER — HOSPITAL ENCOUNTER (OUTPATIENT)
Age: 74
Discharge: HOME | End: 2024-09-06
Payer: MEDICARE

## 2024-09-04 ENCOUNTER — HOSPITAL ENCOUNTER (OUTPATIENT)
Dept: GENERAL RADIOLOGY | Age: 74
Discharge: HOME | End: 2024-09-06
Payer: MEDICARE

## 2024-09-04 ENCOUNTER — OFFICE VISIT (OUTPATIENT)
Dept: FAMILY MEDICINE CLINIC | Age: 74
End: 2024-09-04
Payer: MEDICARE

## 2024-09-04 VITALS
BODY MASS INDEX: 29.12 KG/M2 | HEART RATE: 86 BPM | TEMPERATURE: 98.4 F | OXYGEN SATURATION: 99 % | DIASTOLIC BLOOD PRESSURE: 62 MMHG | WEIGHT: 215 LBS | HEIGHT: 72 IN | SYSTOLIC BLOOD PRESSURE: 124 MMHG

## 2024-09-04 DIAGNOSIS — M25.512 ACUTE PAIN OF LEFT SHOULDER: Primary | ICD-10-CM

## 2024-09-04 DIAGNOSIS — M25.461 PAIN AND SWELLING OF RIGHT KNEE: ICD-10-CM

## 2024-09-04 DIAGNOSIS — M25.561 PAIN AND SWELLING OF RIGHT KNEE: ICD-10-CM

## 2024-09-04 DIAGNOSIS — Z23 FLU VACCINE NEED: ICD-10-CM

## 2024-09-04 PROCEDURE — 99213 OFFICE O/P EST LOW 20 MIN: CPT | Performed by: REGISTERED NURSE

## 2024-09-04 PROCEDURE — 3074F SYST BP LT 130 MM HG: CPT | Performed by: REGISTERED NURSE

## 2024-09-04 PROCEDURE — 3078F DIAST BP <80 MM HG: CPT | Performed by: REGISTERED NURSE

## 2024-09-04 PROCEDURE — 73560 X-RAY EXAM OF KNEE 1 OR 2: CPT

## 2024-09-04 PROCEDURE — 1123F ACP DISCUSS/DSCN MKR DOCD: CPT | Performed by: REGISTERED NURSE

## 2024-09-04 RX ORDER — PREDNISONE 20 MG/1
20 TABLET ORAL 2 TIMES DAILY
Qty: 10 TABLET | Refills: 0 | Status: SHIPPED | OUTPATIENT
Start: 2024-09-04 | End: 2024-09-09

## 2024-09-04 ASSESSMENT — ANXIETY QUESTIONNAIRES
IF YOU CHECKED OFF ANY PROBLEMS ON THIS QUESTIONNAIRE, HOW DIFFICULT HAVE THESE PROBLEMS MADE IT FOR YOU TO DO YOUR WORK, TAKE CARE OF THINGS AT HOME, OR GET ALONG WITH OTHER PEOPLE: NOT DIFFICULT AT ALL
4. TROUBLE RELAXING: NOT AT ALL
GAD7 TOTAL SCORE: 0
5. BEING SO RESTLESS THAT IT IS HARD TO SIT STILL: NOT AT ALL
7. FEELING AFRAID AS IF SOMETHING AWFUL MIGHT HAPPEN: NOT AT ALL
2. NOT BEING ABLE TO STOP OR CONTROL WORRYING: NOT AT ALL
3. WORRYING TOO MUCH ABOUT DIFFERENT THINGS: NOT AT ALL
6. BECOMING EASILY ANNOYED OR IRRITABLE: NOT AT ALL
1. FEELING NERVOUS, ANXIOUS, OR ON EDGE: NOT AT ALL

## 2024-09-04 ASSESSMENT — PATIENT HEALTH QUESTIONNAIRE - PHQ9
SUM OF ALL RESPONSES TO PHQ QUESTIONS 1-9: 0
1. LITTLE INTEREST OR PLEASURE IN DOING THINGS: NOT AT ALL
SUM OF ALL RESPONSES TO PHQ QUESTIONS 1-9: 0
SUM OF ALL RESPONSES TO PHQ9 QUESTIONS 1 & 2: 0
2. FEELING DOWN, DEPRESSED OR HOPELESS: NOT AT ALL

## 2024-09-04 ASSESSMENT — ENCOUNTER SYMPTOMS: BACK PAIN: 1

## 2024-09-04 NOTE — PROGRESS NOTES
Clarity, UA 08/19/2024 clear   Final   • Glucose, UA POC 08/19/2024 neg   Final   • Blood, UA POC 08/19/2024 trace   Final   • Protein, UA POC 08/19/2024 neg   Final   • Leukocytes, UA 08/19/2024 neg   Final   • Nitrite, UA 08/19/2024 neg   Final   Hospital Outpatient Visit on 07/01/2024   Component Date Value Ref Range Status   • Hemoglobin A1C 07/01/2024 5.6  4.0 - 6.0 % Final   • Estimated Avg Glucose 07/01/2024 114  mg/dL Final    Comment: The ADA and AACC recommend providing the estimated average glucose result to permit better   patient understanding of their HBA1c result.     • PSA 07/01/2024 0.90  0.00 - 4.00 ng/mL Final    Comment: The Roche \"ECLIA\" assay is used.  Results obtained with different assay methods cannot be   used interchangeably.     • Sodium 07/01/2024 140  136 - 145 mmol/L Final   • Potassium 07/01/2024 4.4  3.7 - 5.3 mmol/L Final   • Chloride 07/01/2024 103  98 - 107 mmol/L Final   • CO2 07/01/2024 27  20 - 31 mmol/L Final   • Anion Gap 07/01/2024 10  9 - 16 mmol/L Final   • Glucose, Fasting 07/01/2024 97  74 - 99 mg/dL Final   • BUN 07/01/2024 14  8 - 23 mg/dL Final   • Creatinine 07/01/2024 0.7  0.70 - 1.20 mg/dL Final   • Est, Glom Filt Rate 07/01/2024 >90  >60 mL/min/1.73m2 Final    Comment:       These results are not intended for use in patients <18 years of age.        eGFR results are calculated without a race factor using the 2021 CKD-EPI equation.  Careful clinical correlation is recommended, particularly when comparing to results   calculated using previous equations.  The CKD-EPI equation is less accurate in patients with extremes of muscle mass, extra-renal   metabolism of creatine, excessive creatine ingestion, or following therapy that affects   renal tubular secretion.     • Calcium 07/01/2024 8.8  8.6 - 10.4 mg/dL Final   • Total Protein 07/01/2024 6.5 (L)  6.6 - 8.7 g/dL Final   • Albumin 07/01/2024 4.3  3.5 - 5.2 g/dL Final   • Albumin/Globulin Ratio 07/01/2024 2.0  1.0 -

## 2024-09-24 DIAGNOSIS — G47.00 INSOMNIA, UNSPECIFIED TYPE: ICD-10-CM

## 2024-09-24 DIAGNOSIS — F41.9 ANXIETY: ICD-10-CM

## 2024-09-24 NOTE — TELEPHONE ENCOUNTER
Orders pended for Editing and Approval     Patient requested full labs to be completed being its been a year for complete lab work. All other review of systems negative, except as noted in HPI

## 2024-09-25 RX ORDER — MIRTAZAPINE 7.5 MG/1
7.5 TABLET, FILM COATED ORAL NIGHTLY
Qty: 90 TABLET | Refills: 3 | Status: SHIPPED | OUTPATIENT
Start: 2024-09-25

## 2024-10-01 ENCOUNTER — OFFICE VISIT (OUTPATIENT)
Dept: FAMILY MEDICINE CLINIC | Age: 74
End: 2024-10-01
Payer: MEDICARE

## 2024-10-01 VITALS
DIASTOLIC BLOOD PRESSURE: 62 MMHG | BODY MASS INDEX: 29.26 KG/M2 | HEART RATE: 67 BPM | OXYGEN SATURATION: 98 % | WEIGHT: 216 LBS | SYSTOLIC BLOOD PRESSURE: 138 MMHG | TEMPERATURE: 98.5 F | HEIGHT: 72 IN

## 2024-10-01 DIAGNOSIS — I10 PRIMARY HYPERTENSION: ICD-10-CM

## 2024-10-01 DIAGNOSIS — M25.531 RIGHT WRIST PAIN: ICD-10-CM

## 2024-10-01 DIAGNOSIS — M54.2 NECK PAIN ON RIGHT SIDE: Primary | ICD-10-CM

## 2024-10-01 DIAGNOSIS — G25.0 ESSENTIAL TREMOR: ICD-10-CM

## 2024-10-01 PROCEDURE — 3075F SYST BP GE 130 - 139MM HG: CPT | Performed by: REGISTERED NURSE

## 2024-10-01 PROCEDURE — 1123F ACP DISCUSS/DSCN MKR DOCD: CPT | Performed by: REGISTERED NURSE

## 2024-10-01 PROCEDURE — 99214 OFFICE O/P EST MOD 30 MIN: CPT | Performed by: REGISTERED NURSE

## 2024-10-01 PROCEDURE — 3078F DIAST BP <80 MM HG: CPT | Performed by: REGISTERED NURSE

## 2024-10-01 RX ORDER — CARVEDILOL 6.25 MG/1
6.25 TABLET ORAL 2 TIMES DAILY
Qty: 180 TABLET | Refills: 3 | Status: SHIPPED | OUTPATIENT
Start: 2024-10-01 | End: 2024-10-03 | Stop reason: SDUPTHER

## 2024-10-01 RX ORDER — BACLOFEN 10 MG/1
5 TABLET ORAL NIGHTLY PRN
Qty: 15 TABLET | Refills: 1 | Status: SHIPPED | OUTPATIENT
Start: 2024-10-01

## 2024-10-01 ASSESSMENT — PATIENT HEALTH QUESTIONNAIRE - PHQ9
SUM OF ALL RESPONSES TO PHQ QUESTIONS 1-9: 0
SUM OF ALL RESPONSES TO PHQ QUESTIONS 1-9: 0
2. FEELING DOWN, DEPRESSED OR HOPELESS: NOT AT ALL
SUM OF ALL RESPONSES TO PHQ9 QUESTIONS 1 & 2: 0
1. LITTLE INTEREST OR PLEASURE IN DOING THINGS: NOT AT ALL
SUM OF ALL RESPONSES TO PHQ QUESTIONS 1-9: 0
SUM OF ALL RESPONSES TO PHQ QUESTIONS 1-9: 0

## 2024-10-01 ASSESSMENT — ENCOUNTER SYMPTOMS: SHORTNESS OF BREATH: 0

## 2024-10-01 NOTE — PROGRESS NOTES
[facility]  [address] [address]  Dept: [phone]    CHIEF COMPLAINT:      Chief Complaint   Patient presents with    Hand Numbness     Right hand, pt has not been helping, had surgery on it. Might be coming from neck, would like EMG done          ASSESSMENT & PLAN     1. Neck pain on right side  -     baclofen (LIORESAL) 10 MG tablet; Take 0.5 tablets by mouth nightly as needed (muscle spasms), Disp-15 tablet, R-1Normal  -     Ban - Kalyan Johnson MD, Neurosurgery, [place]  Continue to do physical therapy  Can apply heat for 15 minute intervals  Can take ibuprofen 800 mgBID  2. Right wrist pain  -     External Referral To Orthopaedic Surgery previously seen [facility]  - Patient to discuss emg with his surgeon   3. Primary hypertension  -     carvedilol (COREG) 6.25 MG tablet; Take 1 tablet by mouth 2 times daily, Disp-180 tablet, R-3Normal  4. Essential tremor  -     López Dimas MD, Neurology,  [place]    Return if symptoms worsen or fail to improve.     SUBJECTIVE/OBJECTIVE     Reginaldo Rios is a 74 y.o. male who presents with ongoing pain to his right wrist. He had carpal tunnel surgery in 2018, but only had some relief. He has been going to physical therapy twice a week. His lower back pain has improved, but the pain and numbness to the right hand is worsened. He is wanting an EMG done, but I recommend him going back to the ortho surgeon that did his carpal tunnel surgery first to see if warrants further testing.     He continues to have pain when turning his head to the left he started having pain that shoots down his arm and into his shoulder blade. He is doing physical therapy but he states the exercises they are doing aggravate his neck more than help in his opinion. He describes the pain as an ache, its worst with movement. He would like to go back to a surgeon about his back pain and to see if there is anything

## 2024-10-03 DIAGNOSIS — G47.00 INSOMNIA, UNSPECIFIED TYPE: ICD-10-CM

## 2024-10-03 DIAGNOSIS — I10 PRIMARY HYPERTENSION: ICD-10-CM

## 2024-10-03 DIAGNOSIS — F41.9 ANXIETY: ICD-10-CM

## 2024-10-03 NOTE — TELEPHONE ENCOUNTER
LOV 10/1/2024   RTO 10/22/2024  LRF     Remeron 7.5       9/25/2024     Carvedilol 6.25   10/1/2024       Sent to [facility]         Controlled Substance Monitoring:    Acute and Chronic Pain Monitoring:   RX Monitoring Acute Pain Prescriptions Periodic Controlled Substance Monitoring   6/25/2019   4:53 PM Prescription exceeds daily limit for a specific reason. See comments or note. No signs of potential drug abuse or diversion identified.

## 2024-10-06 RX ORDER — CARVEDILOL 6.25 MG/1
6.25 TABLET ORAL 2 TIMES DAILY
Qty: 180 TABLET | Refills: 3 | Status: SHIPPED | OUTPATIENT
Start: 2024-10-06 | End: 2025-10-06

## 2024-10-06 RX ORDER — MIRTAZAPINE 7.5 MG/1
7.5 TABLET, FILM COATED ORAL NIGHTLY
Qty: 90 TABLET | Refills: 3 | Status: SHIPPED | OUTPATIENT
Start: 2024-10-06

## 2024-10-22 ENCOUNTER — OFFICE VISIT (OUTPATIENT)
Dept: FAMILY MEDICINE CLINIC | Age: 74
End: 2024-10-22
Payer: MEDICARE

## 2024-10-22 VITALS
HEART RATE: 76 BPM | HEIGHT: 72 IN | SYSTOLIC BLOOD PRESSURE: 118 MMHG | BODY MASS INDEX: 29.26 KG/M2 | DIASTOLIC BLOOD PRESSURE: 60 MMHG | WEIGHT: 216 LBS | OXYGEN SATURATION: 98 %

## 2024-10-22 DIAGNOSIS — M25.512 ACUTE PAIN OF LEFT SHOULDER: ICD-10-CM

## 2024-10-22 DIAGNOSIS — M54.2 NECK PAIN ON RIGHT SIDE: Primary | ICD-10-CM

## 2024-10-22 PROCEDURE — 1123F ACP DISCUSS/DSCN MKR DOCD: CPT | Performed by: REGISTERED NURSE

## 2024-10-22 PROCEDURE — 3078F DIAST BP <80 MM HG: CPT | Performed by: REGISTERED NURSE

## 2024-10-22 PROCEDURE — 3074F SYST BP LT 130 MM HG: CPT | Performed by: REGISTERED NURSE

## 2024-10-22 PROCEDURE — 99213 OFFICE O/P EST LOW 20 MIN: CPT | Performed by: REGISTERED NURSE

## 2024-10-22 ASSESSMENT — ANXIETY QUESTIONNAIRES
4. TROUBLE RELAXING: NOT AT ALL
IF YOU CHECKED OFF ANY PROBLEMS ON THIS QUESTIONNAIRE, HOW DIFFICULT HAVE THESE PROBLEMS MADE IT FOR YOU TO DO YOUR WORK, TAKE CARE OF THINGS AT HOME, OR GET ALONG WITH OTHER PEOPLE: NOT DIFFICULT AT ALL
3. WORRYING TOO MUCH ABOUT DIFFERENT THINGS: NOT AT ALL
5. BEING SO RESTLESS THAT IT IS HARD TO SIT STILL: NOT AT ALL
1. FEELING NERVOUS, ANXIOUS, OR ON EDGE: NOT AT ALL
7. FEELING AFRAID AS IF SOMETHING AWFUL MIGHT HAPPEN: NOT AT ALL
2. NOT BEING ABLE TO STOP OR CONTROL WORRYING: NOT AT ALL
6. BECOMING EASILY ANNOYED OR IRRITABLE: NOT AT ALL
GAD7 TOTAL SCORE: 0

## 2024-10-22 ASSESSMENT — ENCOUNTER SYMPTOMS
SHORTNESS OF BREATH: 0
BACK PAIN: 1

## 2024-10-22 ASSESSMENT — PATIENT HEALTH QUESTIONNAIRE - PHQ9
SUM OF ALL RESPONSES TO PHQ QUESTIONS 1-9: 0
SUM OF ALL RESPONSES TO PHQ9 QUESTIONS 1 & 2: 0
2. FEELING DOWN, DEPRESSED OR HOPELESS: NOT AT ALL
1. LITTLE INTEREST OR PLEASURE IN DOING THINGS: NOT AT ALL
SUM OF ALL RESPONSES TO PHQ QUESTIONS 1-9: 0

## 2024-10-22 NOTE — PROGRESS NOTES
[facility]  [facility] [address] [facility] [address]  Dept: [phone]    CHIEF COMPLAINT:      Chief Complaint   Patient presents with    Knee Pain     R knee, Follow up thinks it is worse since starting PT, feels swollen today,     Shoulder Pain     Neck and shoulder area both shoulders, Follow up feels worse,          ASSESSMENT & PLAN         No follow-ups on file.     SUBJECTIVE/OBJECTIVE     Reginaldo Rios is a 74 y.o. male who presents with ongoing pain to his right wrist constantly and left hand pain comes and goes. He had carpal tunnel surgery on the right wrist in 2018, but only had some relief. He has been going to physical therapy twice a week. He states he doesn't really notice any relief in his pain, but he has been approved for more therapy. Currently he states he is having less pain and more stiffness with numbness and tingling in his bilateral hands. He has difficulty with his fine motor in the right hand due to numbness and tingling. His lower back pain has improved with the therapy. He is still wanting an EMG done, but I had recommend him going back to the ortho surgeon that did his carpal tunnel surgery first to see if warrants further testing.     His neck and upper back pain he feels like is getting worst. He has not made an appointment with Dr. Johnson's office. He feels primarily stiff in his neck and shoulders, he states he is icing and doing heat to help. He is doing physical therapy but he states the exercises they are doing aggravate his neck more than help in his opinion. He describes the pain as an ache, its worst with movement. He stated he would like to go back to a surgeon about his back pain and to see if there is anything more that can be done. He does have history of lumbar fusion. He does have an appointment to be seen with Dr. Moore's office December 10, 2024.               Review of Systems   Constitutional:  Negative

## 2024-10-22 NOTE — PROGRESS NOTES
[facility]  [address] [facility] [address]  Dept: [phone]    CHIEF COMPLAINT:      Chief Complaint   Patient presents with    Knee Pain     R knee, Follow up thinks it is worse since starting PT, feels swollen today,     Shoulder Pain     Neck and shoulder area both shoulders, Follow up feels worse,          ASSESSMENT & PLAN     1. Neck pain on right side  -     Deon Bryant MD, Orthopaedic Surgery, [place]  2. Acute pain of left shoulder  -     Deon Bryant MD, Orthopaedic Surgery, [place]     Return if symptoms worsen or fail to improve.     Recommend continuing physical therapy  Follow up with ortho for the hand and his neck and shoulder pain  I think he would benefit from a steroid injection to help alleviate some of the tension through his shoulders.  Continue to take ibuprofen for pain  Can apply moist heat to shoulder    SUBJECTIVE/OBJECTIVE     Reginaldo Rios is a 74 y.o. male who presents for follow up on his neck, shoulders, upper and lower back pain.    Reginaldo Rios is a 74 y.o. male who presents with ongoing pain to his right wrist constantly and left hand pain comes and goes. He had carpal tunnel surgery on the right wrist in 2018, but only had some relief. He has been going to physical therapy twice a week. He states he doesn't really notice any relief in his pain, but he has been approved for more therapy. Currently he states he is having less pain and more stiffness with numbness and tingling in his bilateral hands. He has difficulty with his fine motor in the right hand due to numbness and tingling. His lower back pain has improved with the therapy. He is still wanting an EMG done, but I had recommend him going back to the ortho surgeon that did his carpal tunnel surgery first to see if warrants further testing.     His neck and upper back pain he feels like is getting worst. He has not made an appointment

## 2024-10-31 DIAGNOSIS — M54.2 NECK PAIN ON RIGHT SIDE: ICD-10-CM

## 2024-10-31 RX ORDER — BACLOFEN 10 MG/1
10 TABLET ORAL DAILY
Qty: 30 TABLET | Refills: 1 | Status: SHIPPED | OUTPATIENT
Start: 2024-10-31

## 2024-10-31 NOTE — TELEPHONE ENCOUNTER
LOV: 10/22/2024    RTO:   Future Appointments   Date Time Provider Department Center   11/7/2024  8:45 AM Deon Crockett MD [facility]     LRF: 10/1/24- The original prescription was discontinued on 10/22/2024 by Juventino Patel APRN - CNP for the following reason: DOSE ADJUSTMENT. Renewing this prescription may not be appropriate.           Controlled Substance Monitoring:    Acute and Chronic Pain Monitoring:   RX Monitoring Acute Pain Prescriptions Periodic Controlled Substance Monitoring   6/25/2019   4:53 PM Prescription exceeds daily limit for a specific reason. See comments or note. No signs of potential drug abuse or diversion identified.

## 2024-11-07 ENCOUNTER — OFFICE VISIT (OUTPATIENT)
Dept: ORTHOPEDIC SURGERY | Age: 74
End: 2024-11-07

## 2024-11-07 DIAGNOSIS — M54.2 NECK PAIN: Primary | ICD-10-CM

## 2024-11-07 DIAGNOSIS — M43.12 SPONDYLOLISTHESIS, CERVICAL REGION: ICD-10-CM

## 2024-11-07 DIAGNOSIS — R20.0 BILATERAL HAND NUMBNESS: ICD-10-CM

## 2024-11-07 NOTE — PROGRESS NOTES
Patient ID: Reginaldo Pascualjczleatha is a 74 y.o. male    Chief Compliant:  No chief complaint on file.       Diagnostic imaging:  Lateral flexion-extension cervical spine patient with 1 to 2 mm spondylolisthesis may be 2-3 C5-6 a bit more lordosis on flexion than normal no pathologic motion advanced cervical degenerative disc disease C6-7    AP lateral thoracic spine diffuse idiopathic skeletal hyperostosis with thoracic ankylosis multiple levels.    AP lateral lumbar spine history of L4-5 posterior decompression instrumented fusion left total hip arthroplasty    X-rays right knee moderately severe medial compartment arthritis      Assessment and Plan:  1. Neck pain    2. Bilateral hand numbness    3. Spondylolisthesis, cervical region      Right hand demonstrates thenar atrophy history of right carpal tunnel release without relief.    Patient reports numbness and tingling in bilateral hands left now starting to get worse and more consistent    Failure of physical therapy for cervical spine          Neck pain with radiation to bilateral shoulders and paresthesia in right hand    S/p L4-5 posterior lateral fusion, 10/17/2019    MRI cervical     Follow up after imaging    HPI:  This is a 74 y.o. male who presents to the clinic today as a new patient for neck evaluation.     He feels primarily stiff in his neck and shoulders, with rotation and extension of his neck pain is exacerbated.     Patient reports he started having paresthesia symptoms one week ago and after completing PT he started experiencing it in his left hand.     He completed PT but he states the exercises they are doing aggravate his neck more than help.    He takes 800mg Ibuprofen twice a day without relief    S/p L4-5 posterior lateral fusion, 10/17/2019  S/p right carpal tunnel release 2018      Review of Systems   All other systems reviewed and are negative.      Past History:    Current Outpatient Medications:     baclofen (LIORESAL) 10 MG tablet, Take

## 2024-11-12 ENCOUNTER — OFFICE VISIT (OUTPATIENT)
Dept: PRIMARY CARE CLINIC | Age: 74
End: 2024-11-12

## 2024-11-12 VITALS
DIASTOLIC BLOOD PRESSURE: 62 MMHG | OXYGEN SATURATION: 98 % | WEIGHT: 217 LBS | BODY MASS INDEX: 29.43 KG/M2 | HEART RATE: 62 BPM | SYSTOLIC BLOOD PRESSURE: 128 MMHG | TEMPERATURE: 97.8 F

## 2024-11-12 DIAGNOSIS — M25.431 SWELLING OF RIGHT WRIST: ICD-10-CM

## 2024-11-12 DIAGNOSIS — M25.461 PAIN AND SWELLING OF RIGHT KNEE: Primary | ICD-10-CM

## 2024-11-12 DIAGNOSIS — M19.90 ARTHRITIS: ICD-10-CM

## 2024-11-12 DIAGNOSIS — M25.561 PAIN AND SWELLING OF RIGHT KNEE: Primary | ICD-10-CM

## 2024-11-12 DIAGNOSIS — M25.531 RIGHT WRIST PAIN: ICD-10-CM

## 2024-11-12 RX ORDER — METHYLPREDNISOLONE 4 MG/1
TABLET ORAL
Qty: 1 KIT | Refills: 0 | Status: SHIPPED | OUTPATIENT
Start: 2024-11-12 | End: 2024-11-18

## 2024-11-12 ASSESSMENT — ENCOUNTER SYMPTOMS
BACK PAIN: 1
COLOR CHANGE: 0

## 2024-11-12 NOTE — PROGRESS NOTES
Reginaldo Rios (:  1950) is a 74 y.o. male,Established patient, here for evaluation of the following chief complaint(s):  Wrist Pain (Right wrist, swollen, hard to move, Started 2 weeks ago, has full ROM but hurts to move, )         Assessment & Plan  Arthritis         Orders:    Isabel Dawson MD, Rheumatology, [place]    XR WRIST LEFT (MIN 3 VIEWS); Future    XR KNEE RIGHT (3 VIEWS); Future    methylPREDNISolone (MEDROL DOSEPACK) 4 MG tablet; Take by mouth.    Right wrist pain         Orders:    methylPREDNISolone (MEDROL DOSEPACK) 4 MG tablet; Take by mouth.    Swelling of right wrist         Orders:    XR WRIST LEFT (MIN 3 VIEWS); Future    methylPREDNISolone (MEDROL DOSEPACK) 4 MG tablet; Take by mouth.    Pain and swelling of right knee       Orders:    XR KNEE RIGHT (3 VIEWS); Future    methylPREDNISolone (MEDROL DOSEPACK) 4 MG tablet; Take by mouth.    Advised his NSAID as prescribed prn for discomfort   Start Medrol dose pack   Reviewed several recent labs   Reviewed last note from ortho   Complete imaging as ordered   Est with rheumatology given chronicity of arthritic pain    No follow-ups on file.       Subjective   Patient presents with sudden onset of right wrist pain.  He states he has had this hand \"messed up\" for a long time.  He did have previous carpel tunnel surgery on this hand/wrist.  He is afebrile, no redness, denies any acute injury. He tells me he does have history of arthritis \"everywhere\".  He is currently scheduled for a MRI for his back and seeing Dr Crockett for this. He has never seen rheumatology.    He is also having some pain and swelling in his right knee, this has also been going on for a greater than 1 month.  No injury.  No redness.  Waxing and waning.  Does hurt to internally rotate. Feels more \"stiff\" than anything.         Review of Systems   Musculoskeletal:  Positive for arthralgias (subacute on chronic), back pain (chronic), joint swelling, myalgias

## 2024-11-13 ENCOUNTER — TELEPHONE (OUTPATIENT)
Dept: ORTHOPEDIC SURGERY | Age: 74
End: 2024-11-13

## 2024-11-13 DIAGNOSIS — M54.2 NECK PAIN: Primary | ICD-10-CM

## 2024-11-13 NOTE — TELEPHONE ENCOUNTER
Writer called imaging to clarify order as they asked to do. It states in Dr Crockett office visit he wanted an MRI of the cervical spine so order will be placed. Imaging was notified of the change coming. They said to contact central scheduling to cancel the soft tissue MRI order. Writer spoke with April.

## 2024-11-13 NOTE — TELEPHONE ENCOUNTER
Luiza with [facility] needs clarification on soft tissue of neck.and if it was meant to be cervical spine based off of dx.  Please contact her [phone]

## 2024-11-15 ENCOUNTER — HOSPITAL ENCOUNTER (OUTPATIENT)
Dept: MRI IMAGING | Age: 74
Discharge: HOME | End: 2024-11-17
Attending: ORTHOPAEDIC SURGERY
Payer: MEDICARE

## 2024-11-15 ENCOUNTER — HOSPITAL ENCOUNTER (OUTPATIENT)
Dept: GENERAL RADIOLOGY | Age: 74
Discharge: HOME | End: 2024-11-17
Attending: ORTHOPAEDIC SURGERY
Payer: MEDICARE

## 2024-11-15 ENCOUNTER — HOSPITAL ENCOUNTER (OUTPATIENT)
Age: 74
Discharge: HOME | End: 2024-11-17
Attending: ORTHOPAEDIC SURGERY
Payer: MEDICARE

## 2024-11-15 DIAGNOSIS — M25.431 SWELLING OF RIGHT WRIST: ICD-10-CM

## 2024-11-15 DIAGNOSIS — M54.2 NECK PAIN: ICD-10-CM

## 2024-11-15 DIAGNOSIS — M19.90 ARTHRITIS: ICD-10-CM

## 2024-11-15 DIAGNOSIS — M25.561 PAIN AND SWELLING OF RIGHT KNEE: ICD-10-CM

## 2024-11-15 DIAGNOSIS — M25.461 PAIN AND SWELLING OF RIGHT KNEE: ICD-10-CM

## 2024-11-15 PROCEDURE — 73562 X-RAY EXAM OF KNEE 3: CPT

## 2024-11-15 PROCEDURE — 73110 X-RAY EXAM OF WRIST: CPT

## 2024-11-15 PROCEDURE — 72141 MRI NECK SPINE W/O DYE: CPT

## 2024-11-19 ENCOUNTER — TELEPHONE (OUTPATIENT)
Age: 74
End: 2024-11-19

## 2024-11-20 ENCOUNTER — TELEPHONE (OUTPATIENT)
Dept: FAMILY MEDICINE CLINIC | Age: 74
End: 2024-11-20

## 2024-11-20 NOTE — TELEPHONE ENCOUNTER
Patient called back and he was giving the results of his Right Wrist xray and Knee xray.    He has been informed and understands.

## 2024-11-26 ENCOUNTER — TELEPHONE (OUTPATIENT)
Age: 74
End: 2024-11-26

## 2024-11-27 DIAGNOSIS — I10 PRIMARY HYPERTENSION: ICD-10-CM

## 2024-11-27 RX ORDER — LOSARTAN POTASSIUM AND HYDROCHLOROTHIAZIDE 12.5; 1 MG/1; MG/1
1 TABLET ORAL DAILY
Qty: 90 TABLET | Refills: 1 | Status: SHIPPED | OUTPATIENT
Start: 2024-11-27

## 2024-11-27 NOTE — TELEPHONE ENCOUNTER
LOV: 10/22/2024    RTO:   Future Appointments   Date Time Provider Department Center   12/26/2024  9:30 AM Deon Crockett MD [facility]     LRF: 3/28/24          Controlled Substance Monitoring:    Acute and Chronic Pain Monitoring:   RX Monitoring Acute Pain Prescriptions Periodic Controlled Substance Monitoring   6/25/2019   4:53 PM Prescription exceeds daily limit for a specific reason. See comments or note. No signs of potential drug abuse or diversion identified.

## 2024-12-02 DIAGNOSIS — I10 PRIMARY HYPERTENSION: ICD-10-CM

## 2024-12-02 RX ORDER — LOSARTAN POTASSIUM AND HYDROCHLOROTHIAZIDE 12.5; 1 MG/1; MG/1
1 TABLET ORAL DAILY
Qty: 90 TABLET | Refills: 1 | Status: SHIPPED | OUTPATIENT
Start: 2024-12-02

## 2024-12-02 NOTE — TELEPHONE ENCOUNTER
Spinal stenosis of lumbar region without neurogenic claudication     Lumbar spondylosis     Rotator cuff tear arthropathy     Herniated lumbar intervertebral disc     DDD (degenerative disc disease), cervical     Biceps tendinitis     Nontraumatic tear of left rotator cuff     History of lumbar fusion     Retrolisthesis of vertebrae     Neck pain     Other forms of angina pectoris (HCC)     Subcutaneous mass of back     Scar of upper back excluding scapular region     Primary hypertension

## 2024-12-18 DIAGNOSIS — M62.838 MUSCLE SPASM: ICD-10-CM

## 2024-12-18 RX ORDER — IBUPROFEN 800 MG/1
800 TABLET, FILM COATED ORAL 2 TIMES DAILY PRN
Qty: 180 TABLET | Refills: 1 | Status: SHIPPED | OUTPATIENT
Start: 2024-12-18

## 2024-12-18 NOTE — TELEPHONE ENCOUNTER
LOV: 10/22/2024    RTO:   Future Appointments   Date Time Provider Department Center   12/26/2024  9:30 AM [facility]     LRF: 3/28/2024          Controlled Substance Monitoring:    Acute and Chronic Pain Monitoring:   RX Monitoring Acute Pain Prescriptions Periodic Controlled Substance Monitoring   6/25/2019   4:53 PM Prescription exceeds daily limit for a specific reason. See comments or note. No signs of potential drug abuse or diversion identified.

## 2024-12-26 ENCOUNTER — OFFICE VISIT (OUTPATIENT)
Dept: ORTHOPEDIC SURGERY | Age: 74
End: 2024-12-26
Payer: MEDICARE

## 2024-12-26 VITALS — WEIGHT: 210 LBS | RESPIRATION RATE: 16 BRPM | HEIGHT: 72 IN | BODY MASS INDEX: 28.44 KG/M2

## 2024-12-26 DIAGNOSIS — M43.12 SPONDYLOLISTHESIS, CERVICAL REGION: ICD-10-CM

## 2024-12-26 DIAGNOSIS — G56.03 BILATERAL CARPAL TUNNEL SYNDROME: ICD-10-CM

## 2024-12-26 DIAGNOSIS — M54.2 NECK PAIN: Primary | ICD-10-CM

## 2024-12-26 DIAGNOSIS — R20.0 BILATERAL HAND NUMBNESS: ICD-10-CM

## 2024-12-26 PROCEDURE — 1125F AMNT PAIN NOTED PAIN PRSNT: CPT | Performed by: ORTHOPAEDIC SURGERY

## 2024-12-26 PROCEDURE — G8482 FLU IMMUNIZE ORDER/ADMIN: HCPCS | Performed by: ORTHOPAEDIC SURGERY

## 2024-12-26 PROCEDURE — 1123F ACP DISCUSS/DSCN MKR DOCD: CPT | Performed by: ORTHOPAEDIC SURGERY

## 2024-12-26 PROCEDURE — G8417 CALC BMI ABV UP PARAM F/U: HCPCS | Performed by: ORTHOPAEDIC SURGERY

## 2024-12-26 PROCEDURE — 99213 OFFICE O/P EST LOW 20 MIN: CPT | Performed by: ORTHOPAEDIC SURGERY

## 2024-12-26 PROCEDURE — 3017F COLORECTAL CA SCREEN DOC REV: CPT | Performed by: ORTHOPAEDIC SURGERY

## 2024-12-26 PROCEDURE — 1036F TOBACCO NON-USER: CPT | Performed by: ORTHOPAEDIC SURGERY

## 2024-12-26 PROCEDURE — G8428 CUR MEDS NOT DOCUMENT: HCPCS | Performed by: ORTHOPAEDIC SURGERY

## 2024-12-26 NOTE — PROGRESS NOTES
Take 1 capsule by mouth daily, Disp: , Rfl:     cetirizine (ZYRTEC) 5 MG tablet, Take 2 tablets by mouth daily, Disp: , Rfl:     FIBER PO, Take 3 tablets by mouth daily , Disp: , Rfl:     Garlic 1000 MG CAPS, Take 1 capsule by mouth 3 times daily (with meals) , Disp: , Rfl:   Allergies   Allergen Reactions    Penicillins Other (See Comments)     Other reaction(s): Rash  Pt does not recall the reaction     Chlorhexidine Gluconate Rash     Social History     Socioeconomic History    Marital status:      Spouse name: Not on file    Number of children: 4    Years of education: Not on file    Highest education level: Not on file   Occupational History    Occupation: Retired     Employer: RETIRED   Tobacco Use    Smoking status: Former     Current packs/day: 0.00     Average packs/day: 0.3 packs/day for 1 year (0.3 ttl pk-yrs)     Types: Cigarettes     Start date: 1970     Quit date: 1971     Years since quittin.0    Smokeless tobacco: Never   Vaping Use    Vaping status: Never Used   Substance and Sexual Activity    Alcohol use: Yes     Alcohol/week: 14.0 standard drinks of alcohol     Types: 14 Cans of beer per week     Comment: 2+ drinks per day    Drug use: No    Sexual activity: Never   Other Topics Concern    Not on file   Social History Narrative    Not on file     Social Determinants of Health     Financial Resource Strain: Low Risk  (2024)    Overall Financial Resource Strain (CARDIA)     Difficulty of Paying Living Expenses: Not hard at all   Food Insecurity: No Food Insecurity (12/10/2024)    Received from [facility]    Hunger Screening     Within the past 12 months we worried whether our food would run out before we got money to buy more.: Never True     Within the past 12 months the food we bought just didn't last and we didn't have money to get more.: Never True   Transportation Needs: Unknown (2024)    PRAPARE - Transportation     Lack of Transportation

## 2025-02-10 ENCOUNTER — OFFICE VISIT (OUTPATIENT)
Dept: PRIMARY CARE CLINIC | Age: 75
End: 2025-02-10
Payer: MEDICARE

## 2025-02-10 VITALS
SYSTOLIC BLOOD PRESSURE: 134 MMHG | TEMPERATURE: 98.4 F | HEART RATE: 68 BPM | DIASTOLIC BLOOD PRESSURE: 58 MMHG | WEIGHT: 213 LBS | BODY MASS INDEX: 28.89 KG/M2 | OXYGEN SATURATION: 97 %

## 2025-02-10 DIAGNOSIS — H66.93 BILATERAL ACUTE OTITIS MEDIA: Primary | ICD-10-CM

## 2025-02-10 DIAGNOSIS — R05.1 ACUTE COUGH: ICD-10-CM

## 2025-02-10 PROCEDURE — G8417 CALC BMI ABV UP PARAM F/U: HCPCS

## 2025-02-10 PROCEDURE — G8427 DOCREV CUR MEDS BY ELIG CLIN: HCPCS

## 2025-02-10 PROCEDURE — 1159F MED LIST DOCD IN RCRD: CPT

## 2025-02-10 PROCEDURE — 1123F ACP DISCUSS/DSCN MKR DOCD: CPT

## 2025-02-10 PROCEDURE — 1036F TOBACCO NON-USER: CPT

## 2025-02-10 PROCEDURE — 99213 OFFICE O/P EST LOW 20 MIN: CPT

## 2025-02-10 PROCEDURE — 3078F DIAST BP <80 MM HG: CPT

## 2025-02-10 PROCEDURE — 3075F SYST BP GE 130 - 139MM HG: CPT

## 2025-02-10 PROCEDURE — 3017F COLORECTAL CA SCREEN DOC REV: CPT

## 2025-02-10 PROCEDURE — 1160F RVW MEDS BY RX/DR IN RCRD: CPT

## 2025-02-10 RX ORDER — AZITHROMYCIN 250 MG/1
TABLET, FILM COATED ORAL
Qty: 6 TABLET | Refills: 0 | Status: SHIPPED | OUTPATIENT
Start: 2025-02-10 | End: 2025-02-20

## 2025-02-10 ASSESSMENT — ENCOUNTER SYMPTOMS
SINUS PAIN: 0
NAUSEA: 0
SORE THROAT: 0
COUGH: 1

## 2025-02-10 NOTE — PROGRESS NOTES
Dispense:  6 tablet     Refill:  0             Patient and/or parent given educational materials - see patient instructions.  Discussed use, benefit, and side effects of prescribed medications.  All patient questions answered.  Patient and/or parent voiced understanding.      Labs in the last 30 days:     No visits with results within 1 Month(s) from this visit.   Latest known visit with results is:   Office Visit on 08/19/2024   Component Date Value    Color, UA 08/19/2024 yellow     Clarity, UA 08/19/2024 clear     Glucose, UA POC 08/19/2024 neg     Blood, UA POC 08/19/2024 trace     Protein, UA POC 08/19/2024 neg     Leukocytes, UA 08/19/2024 neg     Nitrite, UA 08/19/2024 neg       Electronically signed by DARIA Morales 2/10/2025 at 4:22 PM

## 2025-02-20 ENCOUNTER — OFFICE VISIT (OUTPATIENT)
Dept: PRIMARY CARE CLINIC | Age: 75
End: 2025-02-20
Payer: MEDICARE

## 2025-02-20 VITALS
OXYGEN SATURATION: 98 % | HEART RATE: 74 BPM | TEMPERATURE: 99.4 F | DIASTOLIC BLOOD PRESSURE: 64 MMHG | SYSTOLIC BLOOD PRESSURE: 134 MMHG

## 2025-02-20 DIAGNOSIS — H66.006 RECURRENT ACUTE SUPPURATIVE OTITIS MEDIA WITHOUT SPONTANEOUS RUPTURE OF TYMPANIC MEMBRANE OF BOTH SIDES: Primary | ICD-10-CM

## 2025-02-20 PROCEDURE — G8427 DOCREV CUR MEDS BY ELIG CLIN: HCPCS

## 2025-02-20 PROCEDURE — 1160F RVW MEDS BY RX/DR IN RCRD: CPT

## 2025-02-20 PROCEDURE — 99213 OFFICE O/P EST LOW 20 MIN: CPT

## 2025-02-20 PROCEDURE — 3017F COLORECTAL CA SCREEN DOC REV: CPT

## 2025-02-20 PROCEDURE — G8417 CALC BMI ABV UP PARAM F/U: HCPCS

## 2025-02-20 PROCEDURE — 1123F ACP DISCUSS/DSCN MKR DOCD: CPT

## 2025-02-20 PROCEDURE — 1159F MED LIST DOCD IN RCRD: CPT

## 2025-02-20 PROCEDURE — 3075F SYST BP GE 130 - 139MM HG: CPT

## 2025-02-20 PROCEDURE — 3078F DIAST BP <80 MM HG: CPT

## 2025-02-20 PROCEDURE — 1036F TOBACCO NON-USER: CPT

## 2025-02-20 RX ORDER — DOXYCYCLINE HYCLATE 100 MG
100 TABLET ORAL 2 TIMES DAILY
Qty: 14 TABLET | Refills: 0 | Status: SHIPPED | OUTPATIENT
Start: 2025-02-20 | End: 2025-02-27

## 2025-02-20 RX ORDER — FLUTICASONE PROPIONATE 50 MCG
2 SPRAY, SUSPENSION (ML) NASAL DAILY
Qty: 48 G | Refills: 0 | Status: SHIPPED | OUTPATIENT
Start: 2025-02-20 | End: 2025-03-22

## 2025-02-20 ASSESSMENT — ENCOUNTER SYMPTOMS
RHINORRHEA: 0
SORE THROAT: 0
COUGH: 0

## 2025-02-20 NOTE — PROGRESS NOTES
[facility]  [facility]  [address] [place] [address]    [facility], [facility] WALK IN  [address] [place] [id number]  Dept: [phone]    Reginaldo Rios is a 74 y.o. male Established patient, who presents to the walk-in clinic today with conditions/complaints as noted below:    Chief Complaint   Patient presents with    Ear Pain     Right ear pain         HPI:     Ear Pain   There is pain in the right ear. This is a recurrent problem. The current episode started in the past 7 days. The problem has been waxing and waning. There has been no fever. The pain is mild. Pertinent negatives include no coughing, ear discharge, headaches, neck pain, rhinorrhea or sore throat. Treatments tried: Was on Azithromycin - did help cough. Reoccuring ear discomfort.       Past Medical History:   Diagnosis Date    Allergic rhinitis     Arthritis     BPH (benign prostatic hyperplasia)     Caffeine use     3 cups coffee/day    Chronic back pain     Diarrhea     ED (erectile dysfunction)     Esophageal stricture     GERD (gastroesophageal reflux disease)     Headache(784.0)     Hemorrhoids     Hyperlipemia     Rotator cuff tear, right 2012       Current Outpatient Medications   Medication Sig Dispense Refill    doxycycline hyclate (VIBRA-TABS) 100 MG tablet Take 1 tablet by mouth 2 times daily for 7 days 14 tablet 0    fluticasone (FLONASE) 50 MCG/ACT nasal spray 2 sprays by Each Nostril route daily 48 g 0    ibuprofen (ADVIL;MOTRIN) 800 MG tablet Take 1 tablet by mouth 2 times daily as needed for Pain 180 tablet 1    losartan-hydroCHLOROthiazide (HYZAAR) 100-12.5 MG per tablet Take 1 tablet by mouth daily 90 tablet 1    baclofen (LIORESAL) 10 MG tablet Take 1 tablet by mouth Daily 30 tablet 1    mirtazapine (REMERON) 7.5 MG tablet Take 1 tablet by mouth nightly 90 tablet 3    carvedilol (COREG) 6.25 MG tablet Take 1 tablet by mouth 2

## 2025-03-10 ENCOUNTER — OFFICE VISIT (OUTPATIENT)
Dept: FAMILY MEDICINE CLINIC | Age: 75
End: 2025-03-10
Payer: MEDICARE

## 2025-03-10 VITALS
HEART RATE: 64 BPM | BODY MASS INDEX: 28.85 KG/M2 | HEIGHT: 72 IN | TEMPERATURE: 97.3 F | WEIGHT: 213 LBS | OXYGEN SATURATION: 98 % | SYSTOLIC BLOOD PRESSURE: 114 MMHG | DIASTOLIC BLOOD PRESSURE: 60 MMHG

## 2025-03-10 DIAGNOSIS — K21.00 GASTROESOPHAGEAL REFLUX DISEASE WITH ESOPHAGITIS WITHOUT HEMORRHAGE: ICD-10-CM

## 2025-03-10 DIAGNOSIS — M51.26 HERNIATED LUMBAR INTERVERTEBRAL DISC: ICD-10-CM

## 2025-03-10 DIAGNOSIS — E78.2 MIXED HYPERLIPIDEMIA: ICD-10-CM

## 2025-03-10 DIAGNOSIS — Z13.1 SCREENING FOR DIABETES MELLITUS: ICD-10-CM

## 2025-03-10 DIAGNOSIS — L97.529 ULCER OF TOE OF LEFT FOOT, UNSPECIFIED ULCER STAGE (HCC): ICD-10-CM

## 2025-03-10 DIAGNOSIS — Z12.5 SCREENING FOR PROSTATE CANCER: Primary | ICD-10-CM

## 2025-03-10 PROCEDURE — 3074F SYST BP LT 130 MM HG: CPT | Performed by: REGISTERED NURSE

## 2025-03-10 PROCEDURE — G8427 DOCREV CUR MEDS BY ELIG CLIN: HCPCS | Performed by: REGISTERED NURSE

## 2025-03-10 PROCEDURE — 1159F MED LIST DOCD IN RCRD: CPT | Performed by: REGISTERED NURSE

## 2025-03-10 PROCEDURE — 99214 OFFICE O/P EST MOD 30 MIN: CPT | Performed by: REGISTERED NURSE

## 2025-03-10 PROCEDURE — 3078F DIAST BP <80 MM HG: CPT | Performed by: REGISTERED NURSE

## 2025-03-10 PROCEDURE — G8417 CALC BMI ABV UP PARAM F/U: HCPCS | Performed by: REGISTERED NURSE

## 2025-03-10 PROCEDURE — 3017F COLORECTAL CA SCREEN DOC REV: CPT | Performed by: REGISTERED NURSE

## 2025-03-10 PROCEDURE — 1123F ACP DISCUSS/DSCN MKR DOCD: CPT | Performed by: REGISTERED NURSE

## 2025-03-10 PROCEDURE — 1036F TOBACCO NON-USER: CPT | Performed by: REGISTERED NURSE

## 2025-03-10 PROCEDURE — 1126F AMNT PAIN NOTED NONE PRSNT: CPT | Performed by: REGISTERED NURSE

## 2025-03-10 RX ORDER — PANTOPRAZOLE SODIUM 40 MG/1
40 TABLET, DELAYED RELEASE ORAL DAILY
Qty: 30 TABLET | Refills: 2 | Status: SHIPPED | OUTPATIENT
Start: 2025-03-10 | End: 2026-03-10

## 2025-03-10 RX ORDER — ATORVASTATIN CALCIUM 40 MG/1
40 TABLET, FILM COATED ORAL DAILY
Qty: 90 TABLET | Refills: 3 | Status: SHIPPED | OUTPATIENT
Start: 2025-03-10 | End: 2026-03-10

## 2025-03-10 RX ORDER — GABAPENTIN 300 MG/1
300 CAPSULE ORAL DAILY
Qty: 180 CAPSULE | Refills: 1 | Status: SHIPPED | OUTPATIENT
Start: 2025-03-10 | End: 2026-03-05

## 2025-03-10 SDOH — ECONOMIC STABILITY: FOOD INSECURITY: WITHIN THE PAST 12 MONTHS, YOU WORRIED THAT YOUR FOOD WOULD RUN OUT BEFORE YOU GOT MONEY TO BUY MORE.: NEVER TRUE

## 2025-03-10 SDOH — ECONOMIC STABILITY: FOOD INSECURITY: WITHIN THE PAST 12 MONTHS, THE FOOD YOU BOUGHT JUST DIDN'T LAST AND YOU DIDN'T HAVE MONEY TO GET MORE.: NEVER TRUE

## 2025-03-10 ASSESSMENT — ENCOUNTER SYMPTOMS
DIARRHEA: 0
ABDOMINAL PAIN: 0
SORE THROAT: 0
COUGH: 0
VOMITING: 0
SHORTNESS OF BREATH: 0
SINUS PRESSURE: 0
NAUSEA: 0
CONSTIPATION: 0

## 2025-03-10 ASSESSMENT — PATIENT HEALTH QUESTIONNAIRE - PHQ9
SUM OF ALL RESPONSES TO PHQ QUESTIONS 1-9: 0
1. LITTLE INTEREST OR PLEASURE IN DOING THINGS: NOT AT ALL
2. FEELING DOWN, DEPRESSED OR HOPELESS: NOT AT ALL
SUM OF ALL RESPONSES TO PHQ QUESTIONS 1-9: 0

## 2025-03-10 NOTE — PROGRESS NOTES
[facility]  [facility] PRIMARY [facility] [place]  [facility]  [place] [address]  Dept: [phone]    CHIEF COMPLAINT:      Chief Complaint   Patient presents with    Neck Pain     F/u, patient wants to discuss his neuro appts    Ear Pain     Patient state he had a double ear infection that he was treated for at the walk in the past month and he just wants Juventino to check his ears         ASSESSMENT & PLAN     Assessment & Plan  1. Double ear infection.  The patient reported undergoing two rounds of antibiotics with minimal relief. A steroid was subsequently prescribed, which cleared up the infection. He continues to experience a feeling of fullness in the right ear, which may take some time to resolve. He has been using Flonase to help dry up the fluid in the ear.    2. Cervical spine issues.  An MRI indicated possible issues at the C6 level, including pinching of the spinal column and narrowing at C3, C4, and C5. The patient consulted with Dr. Lin, who suggested a surgical intervention involving cutting the bones, inserting a bar, and reattaching them. However, Dr. Mack recommended a wait-and-see approach as the patient is not currently exhibiting significant symptoms.    3. Carpal tunnel syndrome.  The patient experiences significant numbness in his hand, which he believes is due to carpal tunnel syndrome. He consulted with a specialist for his right hand, which has not shown improvement. He is going to follow with Dr. Escalante for the left hand to have possible release done, advised to do so after his trip to [place] in June.    1. Screening for prostate cancer  -     PSA Screening; Future  2. Mixed hyperlipidemia  -     atorvastatin (LIPITOR) 40 MG tablet; Take 1 tablet by mouth daily, Disp-90 tablet, R-3Requesting 1 year supplyNormal  -     Lipid Panel; Future  3. Herniated lumbar intervertebral disc  -     gabapentin (NEURONTIN) 300 MG capsule; Take 1 capsule by mouth daily for

## 2025-05-01 ENCOUNTER — TELEPHONE (OUTPATIENT)
Dept: FAMILY MEDICINE CLINIC | Age: 75
End: 2025-05-01

## 2025-05-01 NOTE — TELEPHONE ENCOUNTER
[facility] - did testing for bone density - he would like to know if you got the results?    According to patient states he has osteoporosis 0.81  moderate risk

## 2025-05-05 NOTE — TELEPHONE ENCOUNTER
Juventino Patel, APRN - CNP  p Liliana  [facility] Clinical Support Pool7 hours ago (6:25 AM)     Please get contact information from patient and request records. Let patient know we have not as to date received anything.       PC to patient- LM for the patient to call the office back in regards to Juventino's message.

## 2025-05-06 NOTE — TELEPHONE ENCOUNTER
spoke with the patient and he said their phone number is [phone]. Writer called the number and they will not fax the results over with out the patient signing a HENRIQUE and having it faxed back to them at [phone]. Kokor told the lady that we will just get the copy that the patient has on his phone, we were just trying to save him a trip to the office.  called the patient and LM for the patient to call the office back. The patient can bring the results that he has on his phone to his next visit if he would like or he can bring them in if he has free time. Juventino was also notified.

## 2025-05-12 DIAGNOSIS — I10 PRIMARY HYPERTENSION: ICD-10-CM

## 2025-05-13 RX ORDER — LOSARTAN POTASSIUM AND HYDROCHLOROTHIAZIDE 12.5; 1 MG/1; MG/1
1 TABLET ORAL DAILY
Qty: 90 TABLET | Refills: 3 | Status: SHIPPED | OUTPATIENT
Start: 2025-05-13

## 2025-05-13 NOTE — TELEPHONE ENCOUNTER
LOV: 3/10/2025    RTO: No future appointments.  LRF: 12/2/2024          Controlled Substance Monitoring:    Acute and Chronic Pain Monitoring:   RX Monitoring Acute Pain Prescriptions Periodic Controlled Substance Monitoring   6/25/2019   4:53 PM Prescription exceeds daily limit for a specific reason. See comments or note. No signs of potential drug abuse or diversion identified.

## 2025-05-24 DIAGNOSIS — M62.838 MUSCLE SPASM: ICD-10-CM

## 2025-05-27 RX ORDER — IBUPROFEN 800 MG/1
800 TABLET, FILM COATED ORAL 2 TIMES DAILY PRN
Qty: 180 TABLET | Refills: 3 | Status: SHIPPED | OUTPATIENT
Start: 2025-05-27

## 2025-05-27 NOTE — TELEPHONE ENCOUNTER
LOV: 3/10/2025    RTO:   Future Appointments   Date Time Provider Department Center   8/13/2025 11:00 AM Juventino Patel APRN - CNP [place] [facility] DEP     LRF: 12/18/2024          Controlled Substance Monitoring:    Acute and Chronic Pain Monitoring:   RX Monitoring Acute Pain Prescriptions Periodic Controlled Substance Monitoring   6/25/2019   4:53 PM Prescription exceeds daily limit for a specific reason. See comments or note. No signs of potential drug abuse or diversion identified.

## 2025-06-26 ENCOUNTER — HOSPITAL ENCOUNTER (OUTPATIENT)
Age: 75
Setting detail: SPECIMEN
Discharge: HOME | End: 2025-06-26

## 2025-06-26 ENCOUNTER — OFFICE VISIT (OUTPATIENT)
Dept: FAMILY MEDICINE CLINIC | Age: 75
End: 2025-06-26
Payer: MEDICARE

## 2025-06-26 VITALS
HEIGHT: 72 IN | SYSTOLIC BLOOD PRESSURE: 116 MMHG | WEIGHT: 213 LBS | OXYGEN SATURATION: 96 % | TEMPERATURE: 97.5 F | BODY MASS INDEX: 28.85 KG/M2 | DIASTOLIC BLOOD PRESSURE: 64 MMHG | HEART RATE: 67 BPM

## 2025-06-26 DIAGNOSIS — L97.521 SKIN ULCER OF TOE OF LEFT FOOT, LIMITED TO BREAKDOWN OF SKIN (HCC): Primary | ICD-10-CM

## 2025-06-26 DIAGNOSIS — L97.521 SKIN ULCER OF TOE OF LEFT FOOT, LIMITED TO BREAKDOWN OF SKIN (HCC): ICD-10-CM

## 2025-06-26 PROCEDURE — 1123F ACP DISCUSS/DSCN MKR DOCD: CPT

## 2025-06-26 PROCEDURE — G8427 DOCREV CUR MEDS BY ELIG CLIN: HCPCS

## 2025-06-26 PROCEDURE — 3078F DIAST BP <80 MM HG: CPT

## 2025-06-26 PROCEDURE — 1036F TOBACCO NON-USER: CPT

## 2025-06-26 PROCEDURE — 3074F SYST BP LT 130 MM HG: CPT

## 2025-06-26 PROCEDURE — G8417 CALC BMI ABV UP PARAM F/U: HCPCS

## 2025-06-26 PROCEDURE — 1159F MED LIST DOCD IN RCRD: CPT

## 2025-06-26 PROCEDURE — 3017F COLORECTAL CA SCREEN DOC REV: CPT

## 2025-06-26 PROCEDURE — 99213 OFFICE O/P EST LOW 20 MIN: CPT

## 2025-06-26 PROCEDURE — 1160F RVW MEDS BY RX/DR IN RCRD: CPT

## 2025-06-26 RX ORDER — DOXYCYCLINE HYCLATE 100 MG
100 TABLET ORAL 2 TIMES DAILY
Qty: 14 TABLET | Refills: 0 | Status: SHIPPED | OUTPATIENT
Start: 2025-06-26 | End: 2025-07-03

## 2025-06-26 ASSESSMENT — ENCOUNTER SYMPTOMS: ABDOMINAL PAIN: 0

## 2025-06-26 NOTE — PROGRESS NOTES
Reginaldo Rios (:  1950) is a 74 y.o. male,Established patient, here for evaluation of the following chief complaint(s):  Toe Injury (Left foot toe 2nd toe, has a blood blister on top and spot under toe)      Assessment & Plan  Skin ulcer of toe of left foot, limited to breakdown of skin (HCC)   Acute condition, new,    - Encouraged antibiotics until completion, and discussed possible need for change pending culture results.   - Encouraged a podiatry appointment within the next 7 days for evaluation.  Discussed not only evaluation for recurrent ulcer, but possible further workup for neuropathy depending on their foot sensation exam.   - Encouraged close monitoring for signs of worsening infection, including increased swelling or pain, increased redness or warmth, color changes to the toe, and fevers.   - Instructed patient to clean wound with soap and water at least once a day and inspect thoroughly at least once a day.     Orders:    doxycycline hyclate (VIBRA-TABS) 100 MG tablet; Take 1 tablet by mouth 2 times daily for 7 days    Culture, Wound (with Gram Stain); Future      Return if symptoms worsen or fail to improve.    Subjective       Surgical History:   Past Surgical History:   Procedure Laterality Date    APPENDECTOMY      CARPAL TUNNEL RELEASE Right 2018    COLONOSCOPY      CYSTOSCOPY  2009    ESOPHAGEAL DILATATION      HAND SURGERY      HERNIA REPAIR      LUMBAR LAMINECTOMY  10/17/2019    Dr Lin - L4-5 Fusion    ROTATOR CUFF REPAIR      SKIN LESION EXCISION Left 2024    EXCISION Subcutaneous mass left back performed by Elvin Mcintyre DO at [facility] OR    TOE SURGERY      TOTAL HIP ARTHROPLASTY Left 2018    hip    UPPER GASTROINTESTINAL ENDOSCOPY         Patient Active Problem List   Diagnosis    GERD (gastroesophageal reflux disease)    Hyperlipidemia    External hemorrhoids    Other male erectile dysfunction    Nocturia    Arthropathy    Benign prostatic

## 2025-06-29 LAB
MICROORGANISM SPEC CULT: ABNORMAL
MICROORGANISM SPEC CULT: ABNORMAL
MICROORGANISM/AGENT SPEC: ABNORMAL
SPECIMEN DESCRIPTION: ABNORMAL

## 2025-06-30 ENCOUNTER — RESULTS FOLLOW-UP (OUTPATIENT)
Dept: FAMILY MEDICINE CLINIC | Age: 75
End: 2025-06-30

## 2025-06-30 DIAGNOSIS — L97.521 SKIN ULCER OF TOE OF LEFT FOOT, LIMITED TO BREAKDOWN OF SKIN (HCC): Primary | ICD-10-CM

## 2025-06-30 RX ORDER — LEVOFLOXACIN 500 MG/1
500 TABLET, FILM COATED ORAL DAILY
Qty: 10 TABLET | Refills: 0 | Status: SHIPPED | OUTPATIENT
Start: 2025-06-30 | End: 2025-07-10

## 2025-07-15 DIAGNOSIS — K21.00 GASTROESOPHAGEAL REFLUX DISEASE WITH ESOPHAGITIS WITHOUT HEMORRHAGE: ICD-10-CM

## 2025-07-16 RX ORDER — PANTOPRAZOLE SODIUM 40 MG/1
40 TABLET, DELAYED RELEASE ORAL DAILY
Qty: 90 TABLET | Refills: 3 | Status: SHIPPED | OUTPATIENT
Start: 2025-07-16 | End: 2026-07-16

## 2025-07-16 NOTE — TELEPHONE ENCOUNTER
LOV: 6/26/2025    RTO:   Future Appointments   Date Time Provider Department Center   8/13/2025 11:00 AM Juventino Patel APRN - CNP [place] [facility] DEP     LRF: 3/10/2025          Controlled Substance Monitoring:    Acute and Chronic Pain Monitoring:   RX Monitoring Acute Pain Prescriptions Periodic Controlled Substance Monitoring   6/25/2019   4:53 PM Prescription exceeds daily limit for a specific reason. See comments or note. No signs of potential drug abuse or diversion identified.

## 2025-07-29 ENCOUNTER — TELEPHONE (OUTPATIENT)
Dept: FAMILY MEDICINE CLINIC | Age: 75
End: 2025-07-29

## 2025-07-29 DIAGNOSIS — Z13.21 ENCOUNTER FOR VITAMIN DEFICIENCY SCREENING: Primary | ICD-10-CM

## 2025-07-29 DIAGNOSIS — E55.9 VITAMIN D DEFICIENCY: ICD-10-CM

## 2025-07-29 NOTE — TELEPHONE ENCOUNTER
Patient walks in today requesting a Vitimin D level and b12 to be added to his labs.    Please advise and route to clinical staff.

## 2025-08-04 ENCOUNTER — HOSPITAL ENCOUNTER (OUTPATIENT)
Age: 75
Setting detail: SPECIMEN
Discharge: HOME | End: 2025-08-04

## 2025-08-04 DIAGNOSIS — Z13.21 ENCOUNTER FOR VITAMIN DEFICIENCY SCREENING: ICD-10-CM

## 2025-08-04 DIAGNOSIS — E55.9 VITAMIN D DEFICIENCY: ICD-10-CM

## 2025-08-04 DIAGNOSIS — Z13.1 SCREENING FOR DIABETES MELLITUS: ICD-10-CM

## 2025-08-04 DIAGNOSIS — E78.2 MIXED HYPERLIPIDEMIA: ICD-10-CM

## 2025-08-04 DIAGNOSIS — Z12.5 SCREENING FOR PROSTATE CANCER: ICD-10-CM

## 2025-08-04 LAB
25(OH)D3 SERPL-MCNC: 46 NG/ML (ref 30–100)
ALBUMIN SERPL-MCNC: 4.3 G/DL (ref 3.5–5.2)
ALBUMIN/GLOB SERPL: 2.2 {RATIO} (ref 1–2.5)
ALP SERPL-CCNC: 90 U/L (ref 40–129)
ALT SERPL-CCNC: 33 U/L (ref 10–50)
ANION GAP SERPL CALCULATED.3IONS-SCNC: 10 MMOL/L (ref 9–16)
AST SERPL-CCNC: 32 U/L (ref 10–50)
BILIRUB SERPL-MCNC: 0.5 MG/DL (ref 0–1.2)
BUN SERPL-MCNC: 15 MG/DL (ref 8–23)
CALCIUM SERPL-MCNC: 9.1 MG/DL (ref 8.6–10.4)
CHLORIDE SERPL-SCNC: 103 MMOL/L (ref 98–107)
CHOLEST SERPL-MCNC: 143 MG/DL (ref 0–199)
CHOLESTEROL/HDL RATIO: 2.3
CO2 SERPL-SCNC: 26 MMOL/L (ref 20–31)
CREAT SERPL-MCNC: 0.7 MG/DL (ref 0.7–1.2)
EST. AVERAGE GLUCOSE BLD GHB EST-MCNC: 111 MG/DL
GFR, ESTIMATED: >90 ML/MIN/1.73M2
GLUCOSE P FAST SERPL-MCNC: 97 MG/DL (ref 74–99)
HBA1C MFR BLD: 5.5 % (ref 4–6)
HDLC SERPL-MCNC: 63 MG/DL
LDLC SERPL CALC-MCNC: 62 MG/DL (ref 0–100)
POTASSIUM SERPL-SCNC: 4 MMOL/L (ref 3.7–5.3)
PROT SERPL-MCNC: 6.3 G/DL (ref 6.6–8.7)
PSA SERPL-MCNC: 1.05 NG/ML (ref 0–4)
SODIUM SERPL-SCNC: 139 MMOL/L (ref 136–145)
TRIGL SERPL-MCNC: 90 MG/DL
VIT B12 SERPL-MCNC: 647 PG/ML (ref 232–1245)
VLDLC SERPL CALC-MCNC: 18 MG/DL (ref 1–30)

## 2025-08-06 RX ORDER — TAMSULOSIN HYDROCHLORIDE 0.4 MG/1
0.4 CAPSULE ORAL DAILY
Qty: 90 CAPSULE | Refills: 0 | Status: SHIPPED | OUTPATIENT
Start: 2025-08-06

## 2025-08-13 ENCOUNTER — HOSPITAL ENCOUNTER (OUTPATIENT)
Dept: GENERAL RADIOLOGY | Age: 75
Discharge: HOME | End: 2025-08-15
Payer: MEDICARE

## 2025-08-13 ENCOUNTER — OFFICE VISIT (OUTPATIENT)
Dept: FAMILY MEDICINE CLINIC | Age: 75
End: 2025-08-13
Payer: MEDICARE

## 2025-08-13 VITALS
HEIGHT: 72 IN | BODY MASS INDEX: 28.71 KG/M2 | HEART RATE: 67 BPM | DIASTOLIC BLOOD PRESSURE: 60 MMHG | OXYGEN SATURATION: 97 % | SYSTOLIC BLOOD PRESSURE: 108 MMHG | TEMPERATURE: 98.5 F | WEIGHT: 212 LBS

## 2025-08-13 DIAGNOSIS — H66.006 RECURRENT ACUTE SUPPURATIVE OTITIS MEDIA WITHOUT SPONTANEOUS RUPTURE OF TYMPANIC MEMBRANE OF BOTH SIDES: ICD-10-CM

## 2025-08-13 DIAGNOSIS — M25.561 PAIN AND SWELLING OF RIGHT KNEE: ICD-10-CM

## 2025-08-13 DIAGNOSIS — M25.461 PAIN AND SWELLING OF RIGHT KNEE: ICD-10-CM

## 2025-08-13 DIAGNOSIS — M81.0 AGE-RELATED OSTEOPOROSIS WITHOUT CURRENT PATHOLOGICAL FRACTURE: ICD-10-CM

## 2025-08-13 DIAGNOSIS — M25.50 ARTHRALGIA, UNSPECIFIED JOINT: ICD-10-CM

## 2025-08-13 DIAGNOSIS — Z00.00 MEDICARE ANNUAL WELLNESS VISIT, SUBSEQUENT: Primary | ICD-10-CM

## 2025-08-13 DIAGNOSIS — M25.40 SWELLING OF MULTIPLE JOINTS: ICD-10-CM

## 2025-08-13 PROCEDURE — 1036F TOBACCO NON-USER: CPT | Performed by: REGISTERED NURSE

## 2025-08-13 PROCEDURE — G8417 CALC BMI ABV UP PARAM F/U: HCPCS | Performed by: REGISTERED NURSE

## 2025-08-13 PROCEDURE — 1159F MED LIST DOCD IN RCRD: CPT | Performed by: REGISTERED NURSE

## 2025-08-13 PROCEDURE — 1123F ACP DISCUSS/DSCN MKR DOCD: CPT | Performed by: REGISTERED NURSE

## 2025-08-13 PROCEDURE — 99214 OFFICE O/P EST MOD 30 MIN: CPT | Performed by: REGISTERED NURSE

## 2025-08-13 PROCEDURE — G0439 PPPS, SUBSEQ VISIT: HCPCS | Performed by: REGISTERED NURSE

## 2025-08-13 PROCEDURE — 3078F DIAST BP <80 MM HG: CPT | Performed by: REGISTERED NURSE

## 2025-08-13 PROCEDURE — 3074F SYST BP LT 130 MM HG: CPT | Performed by: REGISTERED NURSE

## 2025-08-13 PROCEDURE — 73560 X-RAY EXAM OF KNEE 1 OR 2: CPT

## 2025-08-13 PROCEDURE — 3017F COLORECTAL CA SCREEN DOC REV: CPT | Performed by: REGISTERED NURSE

## 2025-08-13 PROCEDURE — G8427 DOCREV CUR MEDS BY ELIG CLIN: HCPCS | Performed by: REGISTERED NURSE

## 2025-08-13 RX ORDER — IBUPROFEN 200 MG
1 CAPSULE ORAL DAILY
Qty: 90 TABLET | Refills: 3 | Status: SHIPPED | OUTPATIENT
Start: 2025-08-13 | End: 2026-08-13

## 2025-08-13 RX ORDER — FLUTICASONE PROPIONATE 50 MCG
2 SPRAY, SUSPENSION (ML) NASAL DAILY
Qty: 48 G | Refills: 11 | Status: SHIPPED | OUTPATIENT
Start: 2025-08-13 | End: 2025-09-12

## 2025-08-13 ASSESSMENT — PATIENT HEALTH QUESTIONNAIRE - PHQ9
SUM OF ALL RESPONSES TO PHQ QUESTIONS 1-9: 0
2. FEELING DOWN, DEPRESSED OR HOPELESS: NOT AT ALL
SUM OF ALL RESPONSES TO PHQ QUESTIONS 1-9: 0
1. LITTLE INTEREST OR PLEASURE IN DOING THINGS: NOT AT ALL
SUM OF ALL RESPONSES TO PHQ QUESTIONS 1-9: 0
SUM OF ALL RESPONSES TO PHQ QUESTIONS 1-9: 0

## 2025-08-13 ASSESSMENT — LIFESTYLE VARIABLES
HOW OFTEN DO YOU HAVE A DRINK CONTAINING ALCOHOL: 4 OR MORE TIMES A WEEK
HOW MANY STANDARD DRINKS CONTAINING ALCOHOL DO YOU HAVE ON A TYPICAL DAY: 3 OR 4
HOW OFTEN DURING THE LAST YEAR HAVE YOU HAD A FEELING OF GUILT OR REMORSE AFTER DRINKING: NEVER
HAS A RELATIVE, FRIEND, DOCTOR, OR ANOTHER HEALTH PROFESSIONAL EXPRESSED CONCERN ABOUT YOUR DRINKING OR SUGGESTED YOU CUT DOWN: NO
HAVE YOU OR SOMEONE ELSE BEEN INJURED AS A RESULT OF YOUR DRINKING: NO
HOW OFTEN DURING THE LAST YEAR HAVE YOU FAILED TO DO WHAT WAS NORMALLY EXPECTED FROM YOU BECAUSE OF DRINKING: NEVER
HOW OFTEN DURING THE LAST YEAR HAVE YOU BEEN UNABLE TO REMEMBER WHAT HAPPENED THE NIGHT BEFORE BECAUSE YOU HAD BEEN DRINKING: NEVER
HOW OFTEN DURING THE LAST YEAR HAVE YOU NEEDED AN ALCOHOLIC DRINK FIRST THING IN THE MORNING TO GET YOURSELF GOING AFTER A NIGHT OF HEAVY DRINKING: NEVER
HOW OFTEN DURING THE LAST YEAR HAVE YOU FOUND THAT YOU WERE NOT ABLE TO STOP DRINKING ONCE YOU HAD STARTED: NEVER

## 2025-08-25 DIAGNOSIS — I10 PRIMARY HYPERTENSION: ICD-10-CM

## 2025-08-25 DIAGNOSIS — G47.00 INSOMNIA, UNSPECIFIED TYPE: ICD-10-CM

## 2025-08-25 DIAGNOSIS — F41.9 ANXIETY: ICD-10-CM

## 2025-08-26 ENCOUNTER — OFFICE VISIT (OUTPATIENT)
Dept: ORTHOPEDIC SURGERY | Age: 75
End: 2025-08-26

## 2025-08-26 VITALS — BODY MASS INDEX: 28.71 KG/M2 | HEIGHT: 72 IN | WEIGHT: 212 LBS

## 2025-08-26 DIAGNOSIS — M25.561 CHRONIC PAIN OF RIGHT KNEE: ICD-10-CM

## 2025-08-26 DIAGNOSIS — G89.29 CHRONIC PAIN OF RIGHT KNEE: ICD-10-CM

## 2025-08-26 DIAGNOSIS — M17.11 ARTHRITIS OF RIGHT KNEE: Primary | ICD-10-CM

## 2025-08-26 RX ORDER — MIRTAZAPINE 7.5 MG/1
7.5 TABLET, FILM COATED ORAL NIGHTLY
Qty: 90 TABLET | Refills: 3 | Status: SHIPPED | OUTPATIENT
Start: 2025-08-26

## 2025-08-26 RX ORDER — CARVEDILOL 6.25 MG/1
6.25 TABLET ORAL 2 TIMES DAILY
Qty: 180 TABLET | Refills: 3 | Status: SHIPPED | OUTPATIENT
Start: 2025-08-26

## 2025-08-26 ASSESSMENT — ENCOUNTER SYMPTOMS
COUGH: 0
SHORTNESS OF BREATH: 0
VOMITING: 0
COLOR CHANGE: 0

## 2025-08-28 ENCOUNTER — OFFICE VISIT (OUTPATIENT)
Age: 75
End: 2025-08-28
Payer: MEDICARE

## 2025-08-28 ENCOUNTER — TELEPHONE (OUTPATIENT)
Dept: FAMILY MEDICINE CLINIC | Age: 75
End: 2025-08-28

## 2025-08-28 VITALS — WEIGHT: 212 LBS | HEIGHT: 72 IN | BODY MASS INDEX: 28.71 KG/M2

## 2025-08-28 DIAGNOSIS — N13.8 BENIGN PROSTATIC HYPERPLASIA WITH URINARY OBSTRUCTION: Primary | ICD-10-CM

## 2025-08-28 DIAGNOSIS — N40.1 BENIGN PROSTATIC HYPERPLASIA WITH URINARY OBSTRUCTION: Primary | ICD-10-CM

## 2025-08-28 DIAGNOSIS — N52.8 OTHER MALE ERECTILE DYSFUNCTION: ICD-10-CM

## 2025-08-28 DIAGNOSIS — R35.0 FREQUENCY OF MICTURITION: ICD-10-CM

## 2025-08-28 LAB
BILIRUBIN, POC: NORMAL
BLOOD URINE, POC: NORMAL
CLARITY, POC: CLEAR
COLOR, POC: YELLOW
GLUCOSE URINE, POC: NORMAL MG/DL
KETONES, POC: NORMAL
LEUKOCYTE EST, POC: NORMAL
NITRITE, POC: NORMAL
PH, POC: NORMAL
PROTEIN, POC: NORMAL MG/DL
SPECIFIC GRAVITY, POC: NORMAL
UROBILINOGEN, POC: NORMAL

## 2025-08-28 PROCEDURE — G8427 DOCREV CUR MEDS BY ELIG CLIN: HCPCS | Performed by: SPECIALIST

## 2025-08-28 PROCEDURE — 99214 OFFICE O/P EST MOD 30 MIN: CPT | Performed by: SPECIALIST

## 2025-08-28 PROCEDURE — 3017F COLORECTAL CA SCREEN DOC REV: CPT | Performed by: SPECIALIST

## 2025-08-28 PROCEDURE — 81003 URINALYSIS AUTO W/O SCOPE: CPT | Performed by: SPECIALIST

## 2025-08-28 PROCEDURE — 1159F MED LIST DOCD IN RCRD: CPT | Performed by: SPECIALIST

## 2025-08-28 PROCEDURE — G8417 CALC BMI ABV UP PARAM F/U: HCPCS | Performed by: SPECIALIST

## 2025-08-28 PROCEDURE — 1123F ACP DISCUSS/DSCN MKR DOCD: CPT | Performed by: SPECIALIST

## 2025-08-28 PROCEDURE — 1036F TOBACCO NON-USER: CPT | Performed by: SPECIALIST

## 2025-08-28 RX ORDER — TAMSULOSIN HYDROCHLORIDE 0.4 MG/1
0.4 CAPSULE ORAL DAILY
Qty: 90 CAPSULE | Refills: 3 | Status: SHIPPED | OUTPATIENT
Start: 2025-08-28

## 2025-08-28 RX ORDER — TADALAFIL 5 MG/1
5 TABLET ORAL DAILY
Qty: 90 TABLET | Refills: 3 | Status: SHIPPED | OUTPATIENT
Start: 2025-08-28

## 2025-09-03 ENCOUNTER — TRANSCRIBE ORDERS (OUTPATIENT)
Dept: GENERAL RADIOLOGY | Age: 75
End: 2025-09-03

## 2025-09-03 DIAGNOSIS — Z98.1 HISTORY OF LUMBAR FUSION: ICD-10-CM

## 2025-09-03 DIAGNOSIS — M54.16 LUMBAR RADICULOPATHY: Primary | ICD-10-CM

## (undated) DEVICE — SUTURE VCRL SZ 3-0 L27IN ABSRB UD L26MM CT-2 1/2 CIR J232H

## (undated) DEVICE — GLOVE SURG 7 11.7IN BEAD CUF LIGHT BRN SENSICARE LTX FREE

## (undated) DEVICE — GLOVE SURG 7.5 11.7IN BEAD CUF LIGHT BRN SENSICARE LTX FREE

## (undated) DEVICE — MARKER W/PRE PRINTED CUSTOM LABEL

## (undated) DEVICE — BLADE CLIPPER GEN PURP NS

## (undated) DEVICE — SUTURE MCRYL SZ 4-0 L18IN ABSRB UD L19MM PS-2 3/8 CIR PRIM Y496G

## (undated) DEVICE — COVER LT HNDL BLU PLAS

## (undated) DEVICE — 9165 UNIVERSAL PATIENT PLATE: Brand: 3M™

## (undated) DEVICE — 3M™ TEGADERM™ TRANSPARENT FILM DRESSING FRAME STYLE, 1626W, 4 IN X 4-3/4 IN (10 CM X 12 CM), 50/CT 4CT/CASE: Brand: 3M™ TEGADERM™

## (undated) DEVICE — BASIN SET: Brand: MEDLINE INDUSTRIES, INC.

## (undated) DEVICE — GAUZE,SPONGE,2"X2",8PLY,STERILE,LF,2'S: Brand: MEDLINE

## (undated) DEVICE — TOWEL,OR,DSP,ST,BLUE,STD,4/PK,20PK/CS: Brand: MEDLINE

## (undated) DEVICE — MASTISOL ADHESIVE LIQ 2/3ML

## (undated) DEVICE — STRIP,CLOSURE,WOUND,MEDI-STRIP,1/2X4: Brand: MEDLINE

## (undated) DEVICE — SUTURE VCRL SZ 3-0 L27IN ABSRB UD L26MM SH 1/2 CIR J416H

## (undated) DEVICE — 4-PORT MANIFOLD: Brand: NEPTUNE 2

## (undated) DEVICE — Z INACTIVE NO ACTIVE SUPPLIER APPLICATOR MEDICATED 26 CC TINT HI-LITE ORNG STRL CHLORAPREP

## (undated) DEVICE — GOWN,AURORA,NONRNF,XL,30/CS: Brand: MEDLINE

## (undated) DEVICE — PACK SURG PROC REMINDER N WVN DISPOSABLE BEAC TIME OUT

## (undated) DEVICE — Z DUP USE 2272047 PACK SET UP

## (undated) DEVICE — SUTURE VCRL SZ 4-0 L18IN ABSRB UD L19MM PS-2 3/8 CIR PRIM J496H

## (undated) DEVICE — SOLUTION IV IRRIG POUR BRL 0.9% SODIUM CHL 2F7124